# Patient Record
Sex: MALE | Race: WHITE | NOT HISPANIC OR LATINO
[De-identification: names, ages, dates, MRNs, and addresses within clinical notes are randomized per-mention and may not be internally consistent; named-entity substitution may affect disease eponyms.]

---

## 2017-02-27 ENCOUNTER — APPOINTMENT (OUTPATIENT)
Dept: PLASTIC SURGERY | Facility: CLINIC | Age: 82
End: 2017-02-27

## 2017-03-13 ENCOUNTER — APPOINTMENT (OUTPATIENT)
Dept: INTERNAL MEDICINE | Facility: CLINIC | Age: 82
End: 2017-03-13

## 2017-03-13 VITALS
SYSTOLIC BLOOD PRESSURE: 130 MMHG | BODY MASS INDEX: 32.49 KG/M2 | WEIGHT: 195 LBS | DIASTOLIC BLOOD PRESSURE: 80 MMHG | HEART RATE: 72 BPM | TEMPERATURE: 98.4 F | RESPIRATION RATE: 14 BRPM | HEIGHT: 65 IN | OXYGEN SATURATION: 97 %

## 2017-03-13 DIAGNOSIS — J06.9 ACUTE UPPER RESPIRATORY INFECTION, UNSPECIFIED: ICD-10-CM

## 2017-04-12 ENCOUNTER — APPOINTMENT (OUTPATIENT)
Dept: PLASTIC SURGERY | Facility: CLINIC | Age: 82
End: 2017-04-12

## 2017-04-28 ENCOUNTER — OUTPATIENT (OUTPATIENT)
Dept: OUTPATIENT SERVICES | Facility: HOSPITAL | Age: 82
LOS: 1 days | End: 2017-04-28
Payer: MEDICARE

## 2017-04-28 PROCEDURE — 93010 ELECTROCARDIOGRAM REPORT: CPT | Mod: NC

## 2017-04-28 PROCEDURE — 85025 COMPLETE CBC W/AUTO DIFF WBC: CPT

## 2017-04-28 PROCEDURE — 80048 BASIC METABOLIC PNL TOTAL CA: CPT

## 2017-04-28 PROCEDURE — G0463: CPT

## 2017-04-28 PROCEDURE — 36415 COLL VENOUS BLD VENIPUNCTURE: CPT

## 2017-04-28 PROCEDURE — 93005 ELECTROCARDIOGRAM TRACING: CPT

## 2017-05-01 ENCOUNTER — APPOINTMENT (OUTPATIENT)
Dept: INTERNAL MEDICINE | Facility: CLINIC | Age: 82
End: 2017-05-01

## 2017-05-01 VITALS
HEIGHT: 65 IN | WEIGHT: 199 LBS | RESPIRATION RATE: 14 BRPM | BODY MASS INDEX: 33.15 KG/M2 | DIASTOLIC BLOOD PRESSURE: 70 MMHG | SYSTOLIC BLOOD PRESSURE: 100 MMHG | TEMPERATURE: 97.9 F

## 2017-05-01 DIAGNOSIS — Z01.818 ENCOUNTER FOR OTHER PREPROCEDURAL EXAMINATION: ICD-10-CM

## 2017-05-02 PROBLEM — Z01.818 PREOP EXAMINATION: Status: ACTIVE | Noted: 2017-05-02

## 2017-05-08 ENCOUNTER — OUTPATIENT (OUTPATIENT)
Dept: OUTPATIENT SERVICES | Facility: HOSPITAL | Age: 82
LOS: 1 days | Discharge: HOME | End: 2017-05-08
Payer: MEDICARE

## 2017-05-08 ENCOUNTER — RESULT REVIEW (OUTPATIENT)
Age: 82
End: 2017-05-08

## 2017-05-08 PROCEDURE — 26145 TENDON EXCISION PALM/FINGER: CPT | Mod: F3

## 2017-05-08 PROCEDURE — 88304 TISSUE EXAM BY PATHOLOGIST: CPT | Mod: 26

## 2017-05-09 PROCEDURE — 26055 INCISE FINGER TENDON SHEATH: CPT | Mod: F8

## 2017-05-09 PROCEDURE — 88304 TISSUE EXAM BY PATHOLOGIST: CPT

## 2017-05-15 ENCOUNTER — TRANSCRIPTION ENCOUNTER (OUTPATIENT)
Age: 82
End: 2017-05-15

## 2017-05-17 ENCOUNTER — APPOINTMENT (OUTPATIENT)
Dept: PLASTIC SURGERY | Facility: CLINIC | Age: 82
End: 2017-05-17

## 2017-05-19 ENCOUNTER — APPOINTMENT (OUTPATIENT)
Dept: CARDIOLOGY | Facility: CLINIC | Age: 82
End: 2017-05-19

## 2017-05-19 VITALS
HEART RATE: 71 BPM | HEIGHT: 65 IN | OXYGEN SATURATION: 94 % | WEIGHT: 198 LBS | DIASTOLIC BLOOD PRESSURE: 75 MMHG | BODY MASS INDEX: 32.99 KG/M2 | SYSTOLIC BLOOD PRESSURE: 121 MMHG

## 2017-05-19 DIAGNOSIS — I73.9 PERIPHERAL VASCULAR DISEASE, UNSPECIFIED: ICD-10-CM

## 2017-05-23 ENCOUNTER — APPOINTMENT (OUTPATIENT)
Dept: CARDIOLOGY | Facility: CLINIC | Age: 82
End: 2017-05-23

## 2017-05-31 ENCOUNTER — APPOINTMENT (OUTPATIENT)
Dept: PLASTIC SURGERY | Facility: CLINIC | Age: 82
End: 2017-05-31

## 2017-05-31 DIAGNOSIS — M65.30 TRIGGER FINGER, UNSPECIFIED FINGER: ICD-10-CM

## 2017-07-24 ENCOUNTER — RX RENEWAL (OUTPATIENT)
Age: 82
End: 2017-07-24

## 2017-08-15 ENCOUNTER — APPOINTMENT (OUTPATIENT)
Dept: CARDIOLOGY | Facility: CLINIC | Age: 82
End: 2017-08-15
Payer: MEDICARE

## 2017-08-15 PROCEDURE — 93280 PM DEVICE PROGR EVAL DUAL: CPT

## 2017-10-30 ENCOUNTER — RX RENEWAL (OUTPATIENT)
Age: 82
End: 2017-10-30

## 2017-12-01 ENCOUNTER — MEDICATION RENEWAL (OUTPATIENT)
Age: 82
End: 2017-12-01

## 2018-01-10 ENCOUNTER — RX RENEWAL (OUTPATIENT)
Age: 83
End: 2018-01-10

## 2018-02-13 ENCOUNTER — RX RENEWAL (OUTPATIENT)
Age: 83
End: 2018-02-13

## 2018-02-15 ENCOUNTER — APPOINTMENT (OUTPATIENT)
Dept: GASTROENTEROLOGY | Facility: CLINIC | Age: 83
End: 2018-02-15
Payer: MEDICARE

## 2018-02-15 VITALS
OXYGEN SATURATION: 96 % | TEMPERATURE: 98 F | DIASTOLIC BLOOD PRESSURE: 84 MMHG | HEART RATE: 64 BPM | HEIGHT: 65 IN | WEIGHT: 195 LBS | BODY MASS INDEX: 32.49 KG/M2 | SYSTOLIC BLOOD PRESSURE: 137 MMHG | RESPIRATION RATE: 13 BRPM

## 2018-02-15 DIAGNOSIS — R14.2 ERUCTATION: ICD-10-CM

## 2018-02-15 DIAGNOSIS — K63.89 OTHER SPECIFIED DISEASES OF INTESTINE: ICD-10-CM

## 2018-02-15 DIAGNOSIS — K58.1 IRRITABLE BOWEL SYNDROME WITH CONSTIPATION: ICD-10-CM

## 2018-02-15 PROCEDURE — 99214 OFFICE O/P EST MOD 30 MIN: CPT

## 2018-02-26 ENCOUNTER — APPOINTMENT (OUTPATIENT)
Dept: INTERNAL MEDICINE | Facility: CLINIC | Age: 83
End: 2018-02-26
Payer: MEDICARE

## 2018-02-26 ENCOUNTER — NON-APPOINTMENT (OUTPATIENT)
Age: 83
End: 2018-02-26

## 2018-02-26 VITALS
SYSTOLIC BLOOD PRESSURE: 110 MMHG | WEIGHT: 195 LBS | DIASTOLIC BLOOD PRESSURE: 74 MMHG | TEMPERATURE: 98.1 F | HEART RATE: 66 BPM | RESPIRATION RATE: 14 BRPM | BODY MASS INDEX: 32.45 KG/M2 | OXYGEN SATURATION: 98 %

## 2018-02-26 DIAGNOSIS — Z00.00 ENCOUNTER FOR GENERAL ADULT MEDICAL EXAMINATION W/OUT ABNORMAL FINDINGS: ICD-10-CM

## 2018-02-26 PROCEDURE — G0439: CPT | Mod: 25

## 2018-02-26 PROCEDURE — 93000 ELECTROCARDIOGRAM COMPLETE: CPT

## 2018-02-26 RX ORDER — AZITHROMYCIN 250 MG/1
250 TABLET, FILM COATED ORAL
Qty: 1 | Refills: 0 | Status: DISCONTINUED | COMMUNITY
Start: 2017-03-13 | End: 2018-02-26

## 2018-02-28 ENCOUNTER — LABORATORY RESULT (OUTPATIENT)
Age: 83
End: 2018-02-28

## 2018-03-06 ENCOUNTER — APPOINTMENT (OUTPATIENT)
Dept: CARDIOLOGY | Facility: CLINIC | Age: 83
End: 2018-03-06
Payer: MEDICARE

## 2018-03-06 PROCEDURE — 93280 PM DEVICE PROGR EVAL DUAL: CPT

## 2018-03-13 DIAGNOSIS — R89.9 UNSPECIFIED ABNORMAL FINDING IN SPECIMENS FROM OTHER ORGANS, SYSTEMS AND TISSUES: ICD-10-CM

## 2018-03-13 LAB
ALBUMIN SERPL ELPH-MCNC: 4 G/DL
ALP BLD-CCNC: 74 U/L
ALT SERPL-CCNC: 21 U/L
ANION GAP SERPL CALC-SCNC: 16 MMOL/L
APPEARANCE: CLEAR
AST SERPL-CCNC: 23 U/L
BASOPHILS # BLD AUTO: 0.03 K/UL
BASOPHILS NFR BLD AUTO: 0.5 %
BILIRUB SERPL-MCNC: 0.4 MG/DL
BILIRUBIN URINE: NEGATIVE
BLOOD URINE: ABNORMAL
BUN SERPL-MCNC: 20 MG/DL
CALCIUM SERPL-MCNC: 10.7 MG/DL
CHLORIDE SERPL-SCNC: 105 MMOL/L
CHOLEST SERPL-MCNC: 221 MG/DL
CHOLEST/HDLC SERPL: 4 RATIO
CO2 SERPL-SCNC: 26 MMOL/L
COLOR: YELLOW
CREAT SERPL-MCNC: 1.27 MG/DL
EOSINOPHIL # BLD AUTO: 0.04 K/UL
EOSINOPHIL NFR BLD AUTO: 0.7 %
ESTIMATED AVERAGE GLUCOSE: 126 MG/DL
FOLATE SERPL-MCNC: 16.7 NG/ML
GLUCOSE QUALITATIVE U: NEGATIVE MG/DL
GLUCOSE SERPL-MCNC: 93 MG/DL
HBA1C MFR BLD HPLC: 6 %
HCT VFR BLD CALC: 44.2 %
HDLC SERPL-MCNC: 55 MG/DL
HGB BLD-MCNC: 14.3 G/DL
IMM GRANULOCYTES NFR BLD AUTO: 0 %
KETONES URINE: NEGATIVE
LDLC SERPL CALC-MCNC: 125 MG/DL
LEUKOCYTE ESTERASE URINE: NEGATIVE
LYMPHOCYTES # BLD AUTO: 3.34 K/UL
LYMPHOCYTES NFR BLD AUTO: 56.8 %
MAN DIFF?: NORMAL
MCHC RBC-ENTMCNC: 28.7 PG
MCHC RBC-ENTMCNC: 32.4 GM/DL
MCV RBC AUTO: 88.6 FL
MONOCYTES # BLD AUTO: 0.59 K/UL
MONOCYTES NFR BLD AUTO: 10 %
NEUTROPHILS # BLD AUTO: 1.88 K/UL
NEUTROPHILS NFR BLD AUTO: 32 %
NITRITE URINE: NEGATIVE
PH URINE: 5.5
PLATELET # BLD AUTO: 198 K/UL
POTASSIUM SERPL-SCNC: 4.1 MMOL/L
PROT SERPL-MCNC: 6.8 G/DL
PROTEIN URINE: NEGATIVE MG/DL
PSA SERPL-MCNC: 8.14 NG/ML
RBC # BLD: 4.99 M/UL
RBC # FLD: 13.8 %
SODIUM SERPL-SCNC: 147 MMOL/L
SPECIFIC GRAVITY URINE: 1.01
TRIGL SERPL-MCNC: 207 MG/DL
TSH SERPL-ACNC: 4.59 UIU/ML
UROBILINOGEN URINE: NEGATIVE MG/DL
VIT B12 SERPL-MCNC: 425 PG/ML
WBC # FLD AUTO: 5.88 K/UL

## 2018-03-26 ENCOUNTER — APPOINTMENT (OUTPATIENT)
Dept: INTERNAL MEDICINE | Facility: CLINIC | Age: 83
End: 2018-03-26

## 2018-04-10 ENCOUNTER — APPOINTMENT (OUTPATIENT)
Dept: CARDIOLOGY | Facility: CLINIC | Age: 83
End: 2018-04-10
Payer: MEDICARE

## 2018-04-10 VITALS
SYSTOLIC BLOOD PRESSURE: 120 MMHG | DIASTOLIC BLOOD PRESSURE: 75 MMHG | WEIGHT: 195 LBS | HEART RATE: 75 BPM | OXYGEN SATURATION: 98 % | BODY MASS INDEX: 32.49 KG/M2 | HEIGHT: 65 IN

## 2018-04-10 DIAGNOSIS — I45.9 CONDUCTION DISORDER, UNSPECIFIED: ICD-10-CM

## 2018-04-10 DIAGNOSIS — R60.0 LOCALIZED EDEMA: ICD-10-CM

## 2018-04-10 DIAGNOSIS — R09.89 OTHER SPECIFIED SYMPTOMS AND SIGNS INVOLVING THE CIRCULATORY AND RESPIRATORY SYSTEMS: ICD-10-CM

## 2018-04-10 PROCEDURE — 99214 OFFICE O/P EST MOD 30 MIN: CPT

## 2018-04-19 ENCOUNTER — APPOINTMENT (OUTPATIENT)
Dept: CARDIOLOGY | Facility: CLINIC | Age: 83
End: 2018-04-19
Payer: MEDICARE

## 2018-04-19 PROCEDURE — 93880 EXTRACRANIAL BILAT STUDY: CPT

## 2018-08-17 ENCOUNTER — APPOINTMENT (OUTPATIENT)
Dept: INTERNAL MEDICINE | Facility: CLINIC | Age: 83
End: 2018-08-17
Payer: MEDICARE

## 2018-08-17 VITALS
RESPIRATION RATE: 14 BRPM | TEMPERATURE: 98 F | SYSTOLIC BLOOD PRESSURE: 120 MMHG | HEART RATE: 76 BPM | BODY MASS INDEX: 32.49 KG/M2 | DIASTOLIC BLOOD PRESSURE: 60 MMHG | OXYGEN SATURATION: 97 % | HEIGHT: 65 IN | WEIGHT: 195 LBS

## 2018-08-17 DIAGNOSIS — M50.90 CERVICAL DISC DISORDER, UNSPECIFIED, UNSPECIFIED CERVICAL REGION: ICD-10-CM

## 2018-08-17 PROCEDURE — 99214 OFFICE O/P EST MOD 30 MIN: CPT | Mod: 25

## 2018-08-17 PROCEDURE — 36415 COLL VENOUS BLD VENIPUNCTURE: CPT

## 2018-08-17 NOTE — HISTORY OF PRESENT ILLNESS
[FreeTextEntry8] : Pt c/o increased abdominal gas. Saw Dr. Tao SALMERON. Would like to get a second opinion.\par Pt also c/o daily pain in his upper neck . This is a chronic problem which has worsened. Saw pain management but had little success with injections. Saw chiropractor without success.

## 2018-08-20 ENCOUNTER — RX RENEWAL (OUTPATIENT)
Age: 83
End: 2018-08-20

## 2018-08-21 DIAGNOSIS — E83.52 HYPERCALCEMIA: ICD-10-CM

## 2018-08-27 LAB
ALBUMIN SERPL ELPH-MCNC: 4.4 G/DL
ALP BLD-CCNC: 58 U/L
ALT SERPL-CCNC: 17 U/L
ANION GAP SERPL CALC-SCNC: 15 MMOL/L
AST SERPL-CCNC: 26 U/L
BASOPHILS # BLD AUTO: 0.02 K/UL
BASOPHILS NFR BLD AUTO: 0.3 %
BILIRUB SERPL-MCNC: 0.5 MG/DL
BUN SERPL-MCNC: 20 MG/DL
CALCIUM SERPL-MCNC: 10.9 MG/DL
CALCIUM SERPL-MCNC: 11 MG/DL
CHLORIDE SERPL-SCNC: 104 MMOL/L
CO2 SERPL-SCNC: 26 MMOL/L
CREAT SERPL-MCNC: 1.13 MG/DL
EOSINOPHIL # BLD AUTO: 0.05 K/UL
EOSINOPHIL NFR BLD AUTO: 0.8 %
FERRITIN SERPL-MCNC: 270 NG/ML
GLUCOSE SERPL-MCNC: 98 MG/DL
HCT VFR BLD CALC: 42.3 %
HGB BLD-MCNC: 13.6 G/DL
IMM GRANULOCYTES NFR BLD AUTO: 0.3 %
IRON SATN MFR SERPL: 24 %
IRON SERPL-MCNC: 78 UG/DL
LYMPHOCYTES # BLD AUTO: 2.54 K/UL
LYMPHOCYTES NFR BLD AUTO: 40.9 %
MAN DIFF?: NORMAL
MCHC RBC-ENTMCNC: 28.5 PG
MCHC RBC-ENTMCNC: 32.2 GM/DL
MCV RBC AUTO: 88.7 FL
MONOCYTES # BLD AUTO: 0.62 K/UL
MONOCYTES NFR BLD AUTO: 10 %
NEUTROPHILS # BLD AUTO: 2.96 K/UL
NEUTROPHILS NFR BLD AUTO: 47.7 %
PARATHYROID HORMONE INTACT: 62 PG/ML
PLATELET # BLD AUTO: 181 K/UL
POTASSIUM SERPL-SCNC: 5.1 MMOL/L
PROT SERPL-MCNC: 7.3 G/DL
RBC # BLD: 4.77 M/UL
RBC # FLD: 13.9 %
SODIUM SERPL-SCNC: 145 MMOL/L
TIBC SERPL-MCNC: 331 UG/DL
UIBC SERPL-MCNC: 253 UG/DL
WBC # FLD AUTO: 6.21 K/UL

## 2018-09-28 ENCOUNTER — RX RENEWAL (OUTPATIENT)
Age: 83
End: 2018-09-28

## 2018-12-03 ENCOUNTER — RX RENEWAL (OUTPATIENT)
Age: 83
End: 2018-12-03

## 2019-01-08 ENCOUNTER — MEDICATION RENEWAL (OUTPATIENT)
Age: 84
End: 2019-01-08

## 2019-01-10 ENCOUNTER — RX RENEWAL (OUTPATIENT)
Age: 84
End: 2019-01-10

## 2019-01-14 ENCOUNTER — RX RENEWAL (OUTPATIENT)
Age: 84
End: 2019-01-14

## 2019-02-11 ENCOUNTER — MEDICATION RENEWAL (OUTPATIENT)
Age: 84
End: 2019-02-11

## 2019-02-11 RX ORDER — ROSUVASTATIN CALCIUM 5 MG/1
5 TABLET, FILM COATED ORAL
Qty: 90 | Refills: 3 | Status: ACTIVE | COMMUNITY
Start: 2018-08-20 | End: 1900-01-01

## 2019-02-25 ENCOUNTER — MEDICATION RENEWAL (OUTPATIENT)
Age: 84
End: 2019-02-25

## 2019-03-19 ENCOUNTER — APPOINTMENT (OUTPATIENT)
Dept: CARDIOLOGY | Facility: CLINIC | Age: 84
End: 2019-03-19
Payer: MEDICARE

## 2019-03-19 PROCEDURE — 93280 PM DEVICE PROGR EVAL DUAL: CPT

## 2019-05-23 ENCOUNTER — CLINICAL ADVICE (OUTPATIENT)
Age: 84
End: 2019-05-23

## 2019-05-23 DIAGNOSIS — R11.0 NAUSEA: ICD-10-CM

## 2019-05-23 RX ORDER — ONDANSETRON 8 MG/1
8 TABLET, ORALLY DISINTEGRATING ORAL EVERY 6 HOURS
Qty: 40 | Refills: 0 | Status: ACTIVE | COMMUNITY
Start: 2019-05-23 | End: 1900-01-01

## 2019-05-28 ENCOUNTER — APPOINTMENT (OUTPATIENT)
Dept: INTERNAL MEDICINE | Facility: CLINIC | Age: 84
End: 2019-05-28
Payer: MEDICARE

## 2019-05-28 VITALS
RESPIRATION RATE: 14 BRPM | HEART RATE: 71 BPM | WEIGHT: 193 LBS | OXYGEN SATURATION: 98 % | TEMPERATURE: 98.2 F | BODY MASS INDEX: 32.15 KG/M2 | DIASTOLIC BLOOD PRESSURE: 70 MMHG | HEIGHT: 65 IN | SYSTOLIC BLOOD PRESSURE: 120 MMHG

## 2019-05-28 PROCEDURE — 99214 OFFICE O/P EST MOD 30 MIN: CPT

## 2019-05-28 RX ORDER — ONDANSETRON 4 MG/1
4 TABLET, ORALLY DISINTEGRATING ORAL
Qty: 12 | Refills: 0 | Status: ACTIVE | COMMUNITY
Start: 2019-05-23

## 2019-05-28 NOTE — PHYSICAL EXAM
[No Acute Distress] : no acute distress [Well Developed] : well developed [Well Nourished] : well nourished [Well-Appearing] : well-appearing [Normal Sclera/Conjunctiva] : normal sclera/conjunctiva [PERRL] : pupils equal round and reactive to light [EOMI] : extraocular movements intact [Normal Outer Ear/Nose] : the outer ears and nose were normal in appearance [Normal Oropharynx] : the oropharynx was normal [No JVD] : no jugular venous distention [Thyroid Normal, No Nodules] : the thyroid was normal and there were no nodules present [Supple] : supple [No Lymphadenopathy] : no lymphadenopathy [No Respiratory Distress] : no respiratory distress  [Clear to Auscultation] : lungs were clear to auscultation bilaterally [Normal Rate] : normal rate  [Regular Rhythm] : with a regular rhythm [No Accessory Muscle Use] : no accessory muscle use [Normal S1, S2] : normal S1 and S2 [No Murmur] : no murmur heard [No Carotid Bruits] : no carotid bruits [No Varicosities] : no varicosities [No Abdominal Bruit] : a ~M bruit was not heard ~T in the abdomen [No Edema] : there was no peripheral edema [Pedal Pulses Present] : the pedal pulses are present [No Extremity Clubbing/Cyanosis] : no extremity clubbing/cyanosis [Soft] : abdomen soft [No Palpable Aorta] : no palpable aorta [No Masses] : no abdominal mass palpated [Non-distended] : non-distended [Non Tender] : non-tender [Normal Posterior Cervical Nodes] : no posterior cervical lymphadenopathy [Normal Bowel Sounds] : normal bowel sounds [No HSM] : no HSM [Normal Anterior Cervical Nodes] : no anterior cervical lymphadenopathy [No CVA Tenderness] : no CVA  tenderness [Grossly Normal Strength/Tone] : grossly normal strength/tone [No Spinal Tenderness] : no spinal tenderness [No Joint Swelling] : no joint swelling [Normal Gait] : normal gait [No Rash] : no rash [Coordination Grossly Intact] : coordination grossly intact [No Focal Deficits] : no focal deficits [Deep Tendon Reflexes (DTR)] : deep tendon reflexes were 2+ and symmetric [Normal Affect] : the affect was normal [Normal Insight/Judgement] : insight and judgment were intact

## 2019-05-28 NOTE — HISTORY OF PRESENT ILLNESS
[FreeTextEntry8] : Pt went to [facility] with abdominal pain on 5/22/19. He was diagnosed with diverticulitis. Pt opted not to stay in the hospital. He was discharged on cefpodoxime and metronidazolw. Pt has continued to have abdominal pain. The pt reports acute diffuse abdominal pain last night with belching.

## 2019-05-29 ENCOUNTER — APPOINTMENT (OUTPATIENT)
Dept: COLORECTAL SURGERY | Facility: CLINIC | Age: 84
End: 2019-05-29
Payer: MEDICARE

## 2019-05-29 VITALS
DIASTOLIC BLOOD PRESSURE: 84 MMHG | SYSTOLIC BLOOD PRESSURE: 121 MMHG | WEIGHT: 193 LBS | HEART RATE: 71 BPM | TEMPERATURE: 98.2 F | BODY MASS INDEX: 32.15 KG/M2 | RESPIRATION RATE: 14 BRPM | HEIGHT: 65 IN

## 2019-05-29 VITALS
HEIGHT: 65 IN | RESPIRATION RATE: 14 BRPM | TEMPERATURE: 98.2 F | SYSTOLIC BLOOD PRESSURE: 119 MMHG | DIASTOLIC BLOOD PRESSURE: 76 MMHG | HEART RATE: 75 BPM | WEIGHT: 193 LBS | BODY MASS INDEX: 32.15 KG/M2

## 2019-05-29 DIAGNOSIS — K57.92 DIVERTICULITIS OF INTESTINE, PART UNSPECIFIED, W/OUT PERFORATION OR ABSCESS W/OUT BLEEDING: ICD-10-CM

## 2019-05-29 LAB
ALBUMIN SERPL ELPH-MCNC: 4.3 G/DL
ALP BLD-CCNC: 73 U/L
ALT SERPL-CCNC: 37 U/L
ANION GAP SERPL CALC-SCNC: 12 MMOL/L
AST SERPL-CCNC: 43 U/L
BASOPHILS # BLD AUTO: 0.04 K/UL
BASOPHILS NFR BLD AUTO: 0.6 %
BILIRUB SERPL-MCNC: 0.4 MG/DL
BUN SERPL-MCNC: 18 MG/DL
CALCIUM SERPL-MCNC: 10.6 MG/DL
CHLORIDE SERPL-SCNC: 104 MMOL/L
CO2 SERPL-SCNC: 26 MMOL/L
CREAT SERPL-MCNC: 1.16 MG/DL
EOSINOPHIL # BLD AUTO: 0.06 K/UL
EOSINOPHIL NFR BLD AUTO: 0.8 %
GLUCOSE SERPL-MCNC: 90 MG/DL
HCT VFR BLD CALC: 48.2 %
HGB BLD-MCNC: 14.1 G/DL
IMM GRANULOCYTES NFR BLD AUTO: 0.3 %
LYMPHOCYTES # BLD AUTO: 2.69 K/UL
LYMPHOCYTES NFR BLD AUTO: 37.2 %
MAN DIFF?: NORMAL
MCHC RBC-ENTMCNC: 27.8 PG
MCHC RBC-ENTMCNC: 29.3 GM/DL
MCV RBC AUTO: 94.9 FL
MONOCYTES # BLD AUTO: 0.77 K/UL
MONOCYTES NFR BLD AUTO: 10.6 %
NEUTROPHILS # BLD AUTO: 3.66 K/UL
NEUTROPHILS NFR BLD AUTO: 50.5 %
PLATELET # BLD AUTO: 229 K/UL
POTASSIUM SERPL-SCNC: 4.7 MMOL/L
PROT SERPL-MCNC: 7.1 G/DL
RBC # BLD: 5.08 M/UL
RBC # FLD: 13.5 %
SODIUM SERPL-SCNC: 142 MMOL/L
WBC # FLD AUTO: 7.24 K/UL

## 2019-05-29 PROCEDURE — 99203 OFFICE O/P NEW LOW 30 MIN: CPT

## 2019-05-29 RX ORDER — METRONIDAZOLE 500 MG/1
500 TABLET ORAL
Qty: 30 | Refills: 0 | Status: COMPLETED | COMMUNITY
Start: 2019-05-23 | End: 2019-05-29

## 2019-05-29 RX ORDER — CEFPODOXIME PROXETIL 200 MG/1
200 TABLET, FILM COATED ORAL
Qty: 14 | Refills: 0 | Status: DISCONTINUED | COMMUNITY
Start: 2019-05-23 | End: 2019-05-29

## 2019-05-29 NOTE — HISTORY OF PRESENT ILLNESS
[FreeTextEntry1] : Mr. Salas presents to the office for consultation.\par ~ 1 week earlier, was seen at [facility] for LLQ abdominal pain. CT A/P was performed and found to have acute sigmoid diverticulitis without associated free air/abscess. WBC 7 at the time. Discharged on Cefpodoxime and flagyl. Has not been taking flagyl secondary to intolerance.\par States LLQ pain has improved. Denies F/C. No N/V. Main c/o is excessive belching and gas pains. Passing diarrheal stools. Repeat WBC yesterday stable at 7.\par Remote h/o colonoscopy, is followed by Dr. Burger.

## 2019-06-07 ENCOUNTER — APPOINTMENT (OUTPATIENT)
Dept: CT IMAGING | Facility: CLINIC | Age: 84
End: 2019-06-07
Payer: MEDICARE

## 2019-06-07 ENCOUNTER — OUTPATIENT (OUTPATIENT)
Dept: OUTPATIENT SERVICES | Facility: HOSPITAL | Age: 84
LOS: 1 days | End: 2019-06-07
Payer: MEDICARE

## 2019-06-07 DIAGNOSIS — Z00.8 ENCOUNTER FOR OTHER GENERAL EXAMINATION: ICD-10-CM

## 2019-06-07 PROCEDURE — 74177 CT ABD & PELVIS W/CONTRAST: CPT | Mod: 26

## 2019-06-07 PROCEDURE — 74177 CT ABD & PELVIS W/CONTRAST: CPT

## 2019-06-10 ENCOUNTER — OTHER (OUTPATIENT)
Age: 84
End: 2019-06-10

## 2019-06-19 ENCOUNTER — OTHER (OUTPATIENT)
Age: 84
End: 2019-06-19

## 2019-06-20 ENCOUNTER — APPOINTMENT (OUTPATIENT)
Dept: INTERNAL MEDICINE | Facility: CLINIC | Age: 84
End: 2019-06-20
Payer: MEDICARE

## 2019-06-20 VITALS
DIASTOLIC BLOOD PRESSURE: 70 MMHG | WEIGHT: 195 LBS | BODY MASS INDEX: 32.45 KG/M2 | SYSTOLIC BLOOD PRESSURE: 110 MMHG | OXYGEN SATURATION: 97 % | TEMPERATURE: 98.1 F | HEART RATE: 77 BPM | RESPIRATION RATE: 14 BRPM

## 2019-06-20 DIAGNOSIS — K21.9 GASTRO-ESOPHAGEAL REFLUX DISEASE W/OUT ESOPHAGITIS: ICD-10-CM

## 2019-06-20 DIAGNOSIS — R10.9 UNSPECIFIED ABDOMINAL PAIN: ICD-10-CM

## 2019-06-20 PROCEDURE — 36415 COLL VENOUS BLD VENIPUNCTURE: CPT

## 2019-06-20 PROCEDURE — 99214 OFFICE O/P EST MOD 30 MIN: CPT | Mod: 25

## 2019-06-20 NOTE — HISTORY OF PRESENT ILLNESS
[FreeTextEntry8] : Pt was feeling better after completing antibiotics for diverticulitis. He had a recent CT Abdomen/Pelvis that showed diverticulosis without diverticulitis. \par For the past 4-r days. pt is c.o epigastric, midabdominal discomfort.

## 2019-06-20 NOTE — PLAN
[FreeTextEntry1] : Suggested going to the ER for further evaluation. Pt refuses\par Increase pantoprazole to 40mg BID.\par Supplement with TUMS PRN\par See GI; likely needs EGD

## 2019-06-20 NOTE — PHYSICAL EXAM
[Well Nourished] : well nourished [No Acute Distress] : no acute distress [Normal Sclera/Conjunctiva] : normal sclera/conjunctiva [Well-Appearing] : well-appearing [Well Developed] : well developed [PERRL] : pupils equal round and reactive to light [EOMI] : extraocular movements intact [No JVD] : no jugular venous distention [Normal Oropharynx] : the oropharynx was normal [Normal Outer Ear/Nose] : the outer ears and nose were normal in appearance [No Lymphadenopathy] : no lymphadenopathy [Supple] : supple [Clear to Auscultation] : lungs were clear to auscultation bilaterally [Thyroid Normal, No Nodules] : the thyroid was normal and there were no nodules present [No Respiratory Distress] : no respiratory distress  [Normal Rate] : normal rate  [No Accessory Muscle Use] : no accessory muscle use [No Murmur] : no murmur heard [Regular Rhythm] : with a regular rhythm [Normal S1, S2] : normal S1 and S2 [No Abdominal Bruit] : a ~M bruit was not heard ~T in the abdomen [No Carotid Bruits] : no carotid bruits [Pedal Pulses Present] : the pedal pulses are present [No Varicosities] : no varicosities [No Extremity Clubbing/Cyanosis] : no extremity clubbing/cyanosis [No Edema] : there was no peripheral edema [No Palpable Aorta] : no palpable aorta [Soft] : abdomen soft [No Masses] : no abdominal mass palpated [Non-distended] : non-distended [No HSM] : no HSM [Normal Posterior Cervical Nodes] : no posterior cervical lymphadenopathy [Normal Anterior Cervical Nodes] : no anterior cervical lymphadenopathy [Normal Bowel Sounds] : normal bowel sounds [No CVA Tenderness] : no CVA  tenderness [No Spinal Tenderness] : no spinal tenderness [Grossly Normal Strength/Tone] : grossly normal strength/tone [No Joint Swelling] : no joint swelling [No Rash] : no rash [Normal Gait] : normal gait [Coordination Grossly Intact] : coordination grossly intact [No Focal Deficits] : no focal deficits [Normal Affect] : the affect was normal [Deep Tendon Reflexes (DTR)] : deep tendon reflexes were 2+ and symmetric [Normal Insight/Judgement] : insight and judgment were intact [de-identified] : mild epigasrid, RUQ discomfort

## 2019-06-24 LAB
ALBUMIN SERPL ELPH-MCNC: 4.5 G/DL
ALP BLD-CCNC: 65 U/L
ALT SERPL-CCNC: 17 U/L
ANION GAP SERPL CALC-SCNC: 12 MMOL/L
AST SERPL-CCNC: 23 U/L
BASOPHILS # BLD AUTO: 0.04 K/UL
BASOPHILS NFR BLD AUTO: 0.7 %
BILIRUB SERPL-MCNC: 0.6 MG/DL
BUN SERPL-MCNC: 20 MG/DL
CALCIUM SERPL-MCNC: 11 MG/DL
CHLORIDE SERPL-SCNC: 103 MMOL/L
CO2 SERPL-SCNC: 25 MMOL/L
CREAT SERPL-MCNC: 1.05 MG/DL
CRP SERPL-MCNC: 0.12 MG/DL
EOSINOPHIL # BLD AUTO: 0.04 K/UL
EOSINOPHIL NFR BLD AUTO: 0.7 %
FERRITIN SERPL-MCNC: 255 NG/ML
GLUCOSE SERPL-MCNC: 101 MG/DL
HCT VFR BLD CALC: 46 %
HGB BLD-MCNC: 14 G/DL
IMM GRANULOCYTES NFR BLD AUTO: 0.2 %
IRON SATN MFR SERPL: 23 %
IRON SERPL-MCNC: 75 UG/DL
LYMPHOCYTES # BLD AUTO: 2.53 K/UL
LYMPHOCYTES NFR BLD AUTO: 42.5 %
MAN DIFF?: NORMAL
MCHC RBC-ENTMCNC: 28 PG
MCHC RBC-ENTMCNC: 30.4 GM/DL
MCV RBC AUTO: 92 FL
MONOCYTES # BLD AUTO: 0.64 K/UL
MONOCYTES NFR BLD AUTO: 10.8 %
NEUTROPHILS # BLD AUTO: 2.69 K/UL
NEUTROPHILS NFR BLD AUTO: 45.1 %
PLATELET # BLD AUTO: 186 K/UL
POTASSIUM SERPL-SCNC: 4.8 MMOL/L
PROT SERPL-MCNC: 7 G/DL
RBC # BLD: 5 M/UL
RBC # FLD: 13.4 %
SODIUM SERPL-SCNC: 140 MMOL/L
TIBC SERPL-MCNC: 322 UG/DL
UIBC SERPL-MCNC: 247 UG/DL
WBC # FLD AUTO: 5.95 K/UL

## 2019-06-27 ENCOUNTER — APPOINTMENT (OUTPATIENT)
Dept: CARDIOLOGY | Facility: CLINIC | Age: 84
End: 2019-06-27
Payer: MEDICARE

## 2019-06-27 ENCOUNTER — NON-APPOINTMENT (OUTPATIENT)
Age: 84
End: 2019-06-27

## 2019-06-27 VITALS
SYSTOLIC BLOOD PRESSURE: 128 MMHG | OXYGEN SATURATION: 96 % | BODY MASS INDEX: 32.32 KG/M2 | WEIGHT: 194 LBS | DIASTOLIC BLOOD PRESSURE: 77 MMHG | HEART RATE: 65 BPM | HEIGHT: 65 IN

## 2019-06-27 DIAGNOSIS — G45.9 TRANSIENT CEREBRAL ISCHEMIC ATTACK, UNSPECIFIED: ICD-10-CM

## 2019-06-27 DIAGNOSIS — E78.5 HYPERLIPIDEMIA, UNSPECIFIED: ICD-10-CM

## 2019-06-27 DIAGNOSIS — I65.29 OCCLUSION AND STENOSIS OF UNSPECIFIED CAROTID ARTERY: ICD-10-CM

## 2019-06-27 DIAGNOSIS — I49.5 SICK SINUS SYNDROME: ICD-10-CM

## 2019-06-27 PROCEDURE — 93000 ELECTROCARDIOGRAM COMPLETE: CPT

## 2019-06-27 PROCEDURE — 99214 OFFICE O/P EST MOD 30 MIN: CPT

## 2019-06-27 NOTE — REASON FOR VISIT
[Follow-Up - Clinic] : a clinic follow-up of [Hypertension] : hypertension [Sick Sinus Syndrome] : sick sinus syndrome [FreeTextEntry1] : and edema

## 2019-06-27 NOTE — DISCUSSION/SUMMARY
[FreeTextEntry1] : The patient's cardiac status is stable. Clinically he has no symptoms of active heart disease and had good functional status until his recent attack of diverticular disease.\par \par He had a stable carotid Doppler in 2018, and pacemaker was checked 3/19.\par \par There are no cardiac contraindications to planned endoscopy. No special precautions other than routine hemodynamic monitoring should be required.

## 2019-06-27 NOTE — HISTORY OF PRESENT ILLNESS
[FreeTextEntry1] : Joel is a 89-year-old gentleman with history of TIA, prior history of hypertension, carotid artery disease, reports that he is a former smoker but hasn't smoked for many years and tries to eat healthy. He has a pacemaker which is functioning within acceptable limits based on his last interrogation 3/19. . He had an echocardiogram 2/14 that showed an ejection fraction of 54%. He had mild TR with PA pressure of 30, and grade 1 diastolic dysfunction. carotid Doppler performed 4/18 showed mild-mod plaque. The patient had a chemical stress was performed 3/14 that showed no ischemia.\par \par The patient has been asymptomatic without any chest pain, shortness of breath, lightheadedness, palpitations. He does all the shopping in his house and had good functional status without chest pain or shortness of breath until 3 weeks ago when he had an acute attack of diverticulitis. He was placed on antibiotics for about 3 weeks. Since that time he now has severe abdominal gas and  pain. He was scheduled for endoscopy this Friday which unfortunately had to be canceled.\par \par ECG shows AV pacing. Blood work 6/19 demonstrates normal CBC and CMP

## 2019-06-27 NOTE — PHYSICAL EXAM
[Well Groomed] : well groomed [Normal Appearance] : normal appearance [General Appearance - Well Developed] : well developed [General Appearance - Well Nourished] : well nourished [No Deformities] : no deformities [General Appearance - In No Acute Distress] : no acute distress [Normal Conjunctiva] : the conjunctiva exhibited no abnormalities [Normal Oral Mucosa] : normal oral mucosa [No Oral Pallor] : no oral pallor [Eyelids - No Xanthelasma] : the eyelids demonstrated no xanthelasmas [Normal Jugular Venous A Waves Present] : normal jugular venous A waves present [No Oral Cyanosis] : no oral cyanosis [Normal Jugular Venous V Waves Present] : normal jugular venous V waves present [No Jugular Venous Nguyen A Waves] : no jugular venous nguyen A waves [Respiration, Rhythm And Depth] : normal respiratory rhythm and effort [Auscultation Breath Sounds / Voice Sounds] : lungs were clear to auscultation bilaterally [Bowel Sounds] : normal bowel sounds [Exaggerated Use Of Accessory Muscles For Inspiration] : no accessory muscle use [Abdomen Soft] : soft [Abdomen Tenderness] : non-tender [Abnormal Walk] : normal gait [Cyanosis, Localized] : no localized cyanosis [Nail Clubbing] : no clubbing of the fingernails [Petechial Hemorrhages (___cm)] : no petechial hemorrhages [Skin Color & Pigmentation] : normal skin color and pigmentation [Skin Turgor] : normal skin turgor [No Venous Stasis] : no venous stasis [] : no rash [No Skin Ulcers] : no skin ulcer [Oriented To Time, Place, And Person] : oriented to person, place, and time [Affect] : the affect was normal [Mood] : the mood was normal [No Anxiety] : not feeling anxious [No Precordial Heave] : no precordial heave was noted [Not Palpable] : not palpable [Normal S1] : normal S1 [Rhythm Regular] : regular [Normal Rate] : normal [No Gallop] : no gallop heard [Normal S2] : normal S2 [I] : a grade 1 [2+] : right 2+ [1+] : left 1+ [Lt] : varicose veins of the left leg noted [___ +] : [unfilled]U+ pitting edema to L ankle [Rt] : varicose veins of the right leg noted [Click] : no click [Apical Thrill] : no thrill palpable at the apex [Pericardial Rub] : no pericardial rub [Right Carotid Bruit] : no bruit heard over the right carotid [Left Carotid Bruit] : no bruit heard over the left carotid [Bruit] : no bruit heard

## 2019-06-30 ENCOUNTER — TRANSCRIPTION ENCOUNTER (OUTPATIENT)
Age: 84
End: 2019-06-30

## 2019-07-01 ENCOUNTER — APPOINTMENT (OUTPATIENT)
Dept: GASTROENTEROLOGY | Facility: HOSPITAL | Age: 84
End: 2019-07-01

## 2019-07-01 ENCOUNTER — RESULT REVIEW (OUTPATIENT)
Age: 84
End: 2019-07-01

## 2019-07-01 ENCOUNTER — OUTPATIENT (OUTPATIENT)
Dept: OUTPATIENT SERVICES | Facility: HOSPITAL | Age: 84
LOS: 1 days | Discharge: HOME | End: 2019-07-01
Payer: MEDICARE

## 2019-07-01 DIAGNOSIS — R14.2 ERUCTATION: ICD-10-CM

## 2019-07-01 PROCEDURE — 88342 IMHCHEM/IMCYTCHM 1ST ANTB: CPT

## 2019-07-01 PROCEDURE — 88312 SPECIAL STAINS GROUP 1: CPT | Mod: 26

## 2019-07-01 PROCEDURE — 88342 IMHCHEM/IMCYTCHM 1ST ANTB: CPT | Mod: 26

## 2019-07-01 PROCEDURE — 88305 TISSUE EXAM BY PATHOLOGIST: CPT | Mod: 26

## 2019-07-01 PROCEDURE — 43239 EGD BIOPSY SINGLE/MULTIPLE: CPT

## 2019-07-01 PROCEDURE — 88305 TISSUE EXAM BY PATHOLOGIST: CPT

## 2019-07-01 PROCEDURE — 88313 SPECIAL STAINS GROUP 2: CPT

## 2019-07-01 PROCEDURE — 88313 SPECIAL STAINS GROUP 2: CPT | Mod: 26

## 2019-07-01 PROCEDURE — 88312 SPECIAL STAINS GROUP 1: CPT

## 2019-07-02 LAB — SURGICAL PATHOLOGY STUDY: SIGNIFICANT CHANGE UP

## 2019-07-05 ENCOUNTER — APPOINTMENT (OUTPATIENT)
Dept: INTERNAL MEDICINE | Facility: CLINIC | Age: 84
End: 2019-07-05
Payer: MEDICARE

## 2019-07-05 VITALS
HEIGHT: 65 IN | WEIGHT: 193 LBS | TEMPERATURE: 98.1 F | HEART RATE: 70 BPM | SYSTOLIC BLOOD PRESSURE: 122 MMHG | RESPIRATION RATE: 14 BRPM | OXYGEN SATURATION: 97 % | BODY MASS INDEX: 32.15 KG/M2 | DIASTOLIC BLOOD PRESSURE: 74 MMHG

## 2019-07-05 PROCEDURE — 99214 OFFICE O/P EST MOD 30 MIN: CPT

## 2019-07-05 NOTE — PHYSICAL EXAM
[No Acute Distress] : no acute distress [Well Developed] : well developed [Well Nourished] : well nourished [Well-Appearing] : well-appearing [Normal Sclera/Conjunctiva] : normal sclera/conjunctiva [EOMI] : extraocular movements intact [PERRL] : pupils equal round and reactive to light [No JVD] : no jugular venous distention [Normal Oropharynx] : the oropharynx was normal [Normal Outer Ear/Nose] : the outer ears and nose were normal in appearance [Supple] : supple [Thyroid Normal, No Nodules] : the thyroid was normal and there were no nodules present [No Lymphadenopathy] : no lymphadenopathy [No Respiratory Distress] : no respiratory distress  [No Accessory Muscle Use] : no accessory muscle use [Clear to Auscultation] : lungs were clear to auscultation bilaterally [Normal Rate] : normal rate  [Regular Rhythm] : with a regular rhythm [Normal S1, S2] : normal S1 and S2 [No Murmur] : no murmur heard [No Carotid Bruits] : no carotid bruits [No Abdominal Bruit] : a ~M bruit was not heard ~T in the abdomen [No Varicosities] : no varicosities [No Edema] : there was no peripheral edema [Pedal Pulses Present] : the pedal pulses are present [No Extremity Clubbing/Cyanosis] : no extremity clubbing/cyanosis [No Palpable Aorta] : no palpable aorta [Soft] : abdomen soft [No Masses] : no abdominal mass palpated [Non-distended] : non-distended [Non Tender] : non-tender [Normal Bowel Sounds] : normal bowel sounds [Normal Posterior Cervical Nodes] : no posterior cervical lymphadenopathy [No HSM] : no HSM [No Spinal Tenderness] : no spinal tenderness [Normal Anterior Cervical Nodes] : no anterior cervical lymphadenopathy [No CVA Tenderness] : no CVA  tenderness [No Joint Swelling] : no joint swelling [Grossly Normal Strength/Tone] : grossly normal strength/tone [No Rash] : no rash [No Focal Deficits] : no focal deficits [Coordination Grossly Intact] : coordination grossly intact [Deep Tendon Reflexes (DTR)] : deep tendon reflexes were 2+ and symmetric [Normal Affect] : the affect was normal [Normal Gait] : normal gait [Normal Insight/Judgement] : insight and judgment were intact

## 2019-07-05 NOTE — HISTORY OF PRESENT ILLNESS
[de-identified] : Pt reports that he had an EGD with Dr. Burger. He is taking pantoprazole 40mg BID.\par Pathology was negative for H. Pylori.\par Feeling a little better.\par Pt is c/o cough over 1 week.

## 2019-08-19 ENCOUNTER — RX RENEWAL (OUTPATIENT)
Age: 84
End: 2019-08-19

## 2019-08-27 ENCOUNTER — APPOINTMENT (OUTPATIENT)
Dept: INTERNAL MEDICINE | Facility: CLINIC | Age: 84
End: 2019-08-27
Payer: MEDICARE

## 2019-08-27 VITALS
WEIGHT: 195 LBS | DIASTOLIC BLOOD PRESSURE: 70 MMHG | RESPIRATION RATE: 14 BRPM | BODY MASS INDEX: 32.49 KG/M2 | OXYGEN SATURATION: 90 % | HEART RATE: 71 BPM | HEIGHT: 65 IN | TEMPERATURE: 98.3 F | SYSTOLIC BLOOD PRESSURE: 110 MMHG

## 2019-08-27 DIAGNOSIS — K29.70 GASTRITIS, UNSPECIFIED, W/OUT BLEEDING: ICD-10-CM

## 2019-08-27 DIAGNOSIS — R14.0 ABDOMINAL DISTENSION (GASEOUS): ICD-10-CM

## 2019-08-27 PROCEDURE — 99214 OFFICE O/P EST MOD 30 MIN: CPT

## 2019-08-27 RX ORDER — AMOXICILLIN AND CLAVULANATE POTASSIUM 875; 125 MG/1; MG/1
875-125 TABLET, COATED ORAL
Qty: 20 | Refills: 0 | Status: DISCONTINUED | COMMUNITY
Start: 2019-05-29 | End: 2019-08-27

## 2019-08-27 RX ORDER — RIFAXIMIN 550 MG/1
550 TABLET ORAL
Qty: 10 | Refills: 0 | Status: DISCONTINUED | COMMUNITY
Start: 2018-02-13 | End: 2019-08-27

## 2019-08-27 RX ORDER — AZITHROMYCIN 250 MG/1
250 TABLET, FILM COATED ORAL
Qty: 1 | Refills: 0 | Status: DISCONTINUED | COMMUNITY
Start: 2019-07-05 | End: 2019-08-27

## 2019-08-27 NOTE — PHYSICAL EXAM
[No Acute Distress] : no acute distress [Well Developed] : well developed [Well Nourished] : well nourished [Normal Sclera/Conjunctiva] : normal sclera/conjunctiva [Well-Appearing] : well-appearing [PERRL] : pupils equal round and reactive to light [EOMI] : extraocular movements intact [Normal Oropharynx] : the oropharynx was normal [Normal Outer Ear/Nose] : the outer ears and nose were normal in appearance [No Lymphadenopathy] : no lymphadenopathy [No JVD] : no jugular venous distention [Thyroid Normal, No Nodules] : the thyroid was normal and there were no nodules present [Supple] : supple [No Respiratory Distress] : no respiratory distress  [Clear to Auscultation] : lungs were clear to auscultation bilaterally [No Accessory Muscle Use] : no accessory muscle use [Regular Rhythm] : with a regular rhythm [Normal Rate] : normal rate  [Normal S1, S2] : normal S1 and S2 [No Murmur] : no murmur heard [No Abdominal Bruit] : a ~M bruit was not heard ~T in the abdomen [No Carotid Bruits] : no carotid bruits [Pedal Pulses Present] : the pedal pulses are present [No Varicosities] : no varicosities [No Palpable Aorta] : no palpable aorta [No Edema] : there was no peripheral edema [Soft] : abdomen soft [No Extremity Clubbing/Cyanosis] : no extremity clubbing/cyanosis [Non-distended] : non-distended [Non Tender] : non-tender [No Masses] : no abdominal mass palpated [Normal Posterior Cervical Nodes] : no posterior cervical lymphadenopathy [Normal Bowel Sounds] : normal bowel sounds [No HSM] : no HSM [Normal Anterior Cervical Nodes] : no anterior cervical lymphadenopathy [No CVA Tenderness] : no CVA  tenderness [No Spinal Tenderness] : no spinal tenderness [No Joint Swelling] : no joint swelling [Grossly Normal Strength/Tone] : grossly normal strength/tone [Coordination Grossly Intact] : coordination grossly intact [No Rash] : no rash [No Focal Deficits] : no focal deficits [Normal Gait] : normal gait [Deep Tendon Reflexes (DTR)] : deep tendon reflexes were 2+ and symmetric [Normal Affect] : the affect was normal [Normal Insight/Judgement] : insight and judgment were intact

## 2019-10-03 ENCOUNTER — APPOINTMENT (OUTPATIENT)
Dept: INTERNAL MEDICINE | Facility: CLINIC | Age: 84
End: 2019-10-03
Payer: MEDICARE

## 2019-10-03 VITALS
SYSTOLIC BLOOD PRESSURE: 110 MMHG | WEIGHT: 195 LBS | HEART RATE: 95 BPM | DIASTOLIC BLOOD PRESSURE: 80 MMHG | OXYGEN SATURATION: 94 % | RESPIRATION RATE: 14 BRPM | BODY MASS INDEX: 32.49 KG/M2 | TEMPERATURE: 98.9 F | HEIGHT: 65 IN

## 2019-10-03 DIAGNOSIS — J20.9 ACUTE BRONCHITIS, UNSPECIFIED: ICD-10-CM

## 2019-10-03 DIAGNOSIS — J21.9 ACUTE BRONCHITIS, UNSPECIFIED: ICD-10-CM

## 2019-10-03 DIAGNOSIS — I10 ESSENTIAL (PRIMARY) HYPERTENSION: ICD-10-CM

## 2019-10-03 PROCEDURE — 99214 OFFICE O/P EST MOD 30 MIN: CPT

## 2019-10-03 RX ORDER — BENZONATATE 100 MG/1
100 CAPSULE ORAL 3 TIMES DAILY
Qty: 30 | Refills: 0 | Status: ACTIVE | COMMUNITY
Start: 2019-10-03 | End: 1900-01-01

## 2019-10-03 RX ORDER — DICYCLOMINE HYDROCHLORIDE 10 MG/1
10 CAPSULE ORAL
Qty: 60 | Refills: 1 | Status: DISCONTINUED | COMMUNITY
Start: 2019-08-27 | End: 2019-10-03

## 2019-10-03 NOTE — PHYSICAL EXAM
[No Acute Distress] : no acute distress [Well Developed] : well developed [Well Nourished] : well nourished [Well-Appearing] : well-appearing [Normal Sclera/Conjunctiva] : normal sclera/conjunctiva [PERRL] : pupils equal round and reactive to light [EOMI] : extraocular movements intact [Normal Outer Ear/Nose] : the outer ears and nose were normal in appearance [Normal Oropharynx] : the oropharynx was normal [No JVD] : no jugular venous distention [No Lymphadenopathy] : no lymphadenopathy [Thyroid Normal, No Nodules] : the thyroid was normal and there were no nodules present [Supple] : supple [No Respiratory Distress] : no respiratory distress  [Clear to Auscultation] : lungs were clear to auscultation bilaterally [Regular Rhythm] : with a regular rhythm [Normal Rate] : normal rate  [No Murmur] : no murmur heard [Normal S1, S2] : normal S1 and S2 [No Abdominal Bruit] : a ~M bruit was not heard ~T in the abdomen [No Carotid Bruits] : no carotid bruits [Pedal Pulses Present] : the pedal pulses are present [No Varicosities] : no varicosities [No Palpable Aorta] : no palpable aorta [No Edema] : there was no peripheral edema [No Extremity Clubbing/Cyanosis] : no extremity clubbing/cyanosis [Non Tender] : non-tender [Soft] : abdomen soft [No Masses] : no abdominal mass palpated [Non-distended] : non-distended [No HSM] : no HSM [Normal Bowel Sounds] : normal bowel sounds [Normal Posterior Cervical Nodes] : no posterior cervical lymphadenopathy [Normal Anterior Cervical Nodes] : no anterior cervical lymphadenopathy [No CVA Tenderness] : no CVA  tenderness [No Joint Swelling] : no joint swelling [No Spinal Tenderness] : no spinal tenderness [Grossly Normal Strength/Tone] : grossly normal strength/tone [No Rash] : no rash [Coordination Grossly Intact] : coordination grossly intact [No Focal Deficits] : no focal deficits [Normal Gait] : normal gait [Normal Affect] : the affect was normal [Deep Tendon Reflexes (DTR)] : deep tendon reflexes were 2+ and symmetric [Normal Insight/Judgement] : insight and judgment were intact [de-identified] : crackles at left lung base, otherwise CTA

## 2019-10-19 ENCOUNTER — APPOINTMENT (OUTPATIENT)
Dept: INTERNAL MEDICINE | Facility: CLINIC | Age: 84
End: 2019-10-19
Payer: MEDICARE

## 2019-10-19 ENCOUNTER — NON-APPOINTMENT (OUTPATIENT)
Age: 84
End: 2019-10-19

## 2019-10-19 VITALS
DIASTOLIC BLOOD PRESSURE: 72 MMHG | OXYGEN SATURATION: 95 % | SYSTOLIC BLOOD PRESSURE: 124 MMHG | RESPIRATION RATE: 14 BRPM | HEIGHT: 65 IN | TEMPERATURE: 98.5 F | BODY MASS INDEX: 32.49 KG/M2 | WEIGHT: 195 LBS | HEART RATE: 54 BPM

## 2019-10-19 DIAGNOSIS — R53.81 OTHER MALAISE: ICD-10-CM

## 2019-10-19 DIAGNOSIS — R07.89 OTHER CHEST PAIN: ICD-10-CM

## 2019-10-19 DIAGNOSIS — R10.13 EPIGASTRIC PAIN: ICD-10-CM

## 2019-10-19 DIAGNOSIS — R53.83 OTHER MALAISE: ICD-10-CM

## 2019-10-19 DIAGNOSIS — Z23 ENCOUNTER FOR IMMUNIZATION: ICD-10-CM

## 2019-10-19 PROCEDURE — 93000 ELECTROCARDIOGRAM COMPLETE: CPT

## 2019-10-19 PROCEDURE — 99214 OFFICE O/P EST MOD 30 MIN: CPT | Mod: 25

## 2019-10-19 PROCEDURE — G0008: CPT

## 2019-10-19 PROCEDURE — 36415 COLL VENOUS BLD VENIPUNCTURE: CPT

## 2019-10-19 PROCEDURE — 90662 IIV NO PRSV INCREASED AG IM: CPT

## 2019-10-19 RX ORDER — CEFUROXIME AXETIL 250 MG/1
250 TABLET ORAL
Qty: 20 | Refills: 0 | Status: DISCONTINUED | COMMUNITY
Start: 2019-10-03 | End: 2019-10-19

## 2019-10-19 NOTE — PHYSICAL EXAM
[No Acute Distress] : no acute distress [Well Developed] : well developed [Well Nourished] : well nourished [Well-Appearing] : well-appearing [PERRL] : pupils equal round and reactive to light [EOMI] : extraocular movements intact [Normal Sclera/Conjunctiva] : normal sclera/conjunctiva [No JVD] : no jugular venous distention [Normal Oropharynx] : the oropharynx was normal [Normal Outer Ear/Nose] : the outer ears and nose were normal in appearance [Supple] : supple [No Lymphadenopathy] : no lymphadenopathy [No Accessory Muscle Use] : no accessory muscle use [No Respiratory Distress] : no respiratory distress  [Thyroid Normal, No Nodules] : the thyroid was normal and there were no nodules present [Normal S1, S2] : normal S1 and S2 [Normal Rate] : normal rate  [Regular Rhythm] : with a regular rhythm [Clear to Auscultation] : lungs were clear to auscultation bilaterally [No Abdominal Bruit] : a ~M bruit was not heard ~T in the abdomen [No Murmur] : no murmur heard [No Carotid Bruits] : no carotid bruits [No Edema] : there was no peripheral edema [Pedal Pulses Present] : the pedal pulses are present [No Varicosities] : no varicosities [Soft] : abdomen soft [No Palpable Aorta] : no palpable aorta [No Extremity Clubbing/Cyanosis] : no extremity clubbing/cyanosis [No Masses] : no abdominal mass palpated [Non-distended] : non-distended [Non Tender] : non-tender [Normal Bowel Sounds] : normal bowel sounds [Normal Posterior Cervical Nodes] : no posterior cervical lymphadenopathy [No HSM] : no HSM [No Spinal Tenderness] : no spinal tenderness [Normal Anterior Cervical Nodes] : no anterior cervical lymphadenopathy [No CVA Tenderness] : no CVA  tenderness [No Joint Swelling] : no joint swelling [No Rash] : no rash [Grossly Normal Strength/Tone] : grossly normal strength/tone [Coordination Grossly Intact] : coordination grossly intact [No Focal Deficits] : no focal deficits [Normal Gait] : normal gait [Normal Affect] : the affect was normal [Deep Tendon Reflexes (DTR)] : deep tendon reflexes were 2+ and symmetric [Normal Insight/Judgement] : insight and judgment were intact

## 2019-10-19 NOTE — HISTORY OF PRESENT ILLNESS
[FreeTextEntry8] : Pt completed 10 days of cefuroxime. Still c.o chest congestion and an occasional cough. \par Not feeling short of breath. PT c/o fatigue and body aches.\par Pt reports that when he coughs or takes a deep breath, he feels a midline chest discomfort.

## 2019-10-30 LAB
ALBUMIN SERPL ELPH-MCNC: 4.4 G/DL
ALP BLD-CCNC: 69 U/L
ALT SERPL-CCNC: 13 U/L
ANION GAP SERPL CALC-SCNC: 16 MMOL/L
APPEARANCE: CLEAR
AST SERPL-CCNC: 20 U/L
BASOPHILS # BLD AUTO: 0.04 K/UL
BASOPHILS NFR BLD AUTO: 0.5 %
BILIRUB SERPL-MCNC: 0.5 MG/DL
BILIRUBIN URINE: NEGATIVE
BLOOD URINE: NORMAL
BUN SERPL-MCNC: 21 MG/DL
CALCIUM SERPL-MCNC: 10.6 MG/DL
CHLORIDE SERPL-SCNC: 101 MMOL/L
CO2 SERPL-SCNC: 24 MMOL/L
COLOR: YELLOW
CREAT SERPL-MCNC: 1.12 MG/DL
CRP SERPL-MCNC: 2.02 MG/DL
EOSINOPHIL # BLD AUTO: 0.07 K/UL
EOSINOPHIL NFR BLD AUTO: 0.9 %
FERRITIN SERPL-MCNC: 278 NG/ML
FOLATE SERPL-MCNC: >20 NG/ML
GLUCOSE QUALITATIVE U: NEGATIVE
GLUCOSE SERPL-MCNC: 81 MG/DL
HCT VFR BLD CALC: 45.6 %
HGB BLD-MCNC: 14.3 G/DL
IMM GRANULOCYTES NFR BLD AUTO: 0.4 %
IRON SATN MFR SERPL: 15 %
IRON SERPL-MCNC: 43 UG/DL
KETONES URINE: NEGATIVE
LEUKOCYTE ESTERASE URINE: NEGATIVE
LYMPHOCYTES # BLD AUTO: 2.34 K/UL
LYMPHOCYTES NFR BLD AUTO: 29 %
MAN DIFF?: NORMAL
MCHC RBC-ENTMCNC: 28.3 PG
MCHC RBC-ENTMCNC: 31.4 GM/DL
MCV RBC AUTO: 90.1 FL
MONOCYTES # BLD AUTO: 1.11 K/UL
MONOCYTES NFR BLD AUTO: 13.8 %
NEUTROPHILS # BLD AUTO: 4.48 K/UL
NEUTROPHILS NFR BLD AUTO: 55.4 %
NITRITE URINE: NEGATIVE
PH URINE: 6
PLATELET # BLD AUTO: 234 K/UL
POTASSIUM SERPL-SCNC: 5 MMOL/L
PROT SERPL-MCNC: 7.3 G/DL
PROTEIN URINE: NORMAL
RBC # BLD: 5.06 M/UL
RBC # FLD: 13.2 %
SODIUM SERPL-SCNC: 141 MMOL/L
SPECIFIC GRAVITY URINE: 1.02
TIBC SERPL-MCNC: 292 UG/DL
UIBC SERPL-MCNC: 249 UG/DL
UROBILINOGEN URINE: NORMAL
VIT B12 SERPL-MCNC: 467 PG/ML
WBC # FLD AUTO: 8.07 K/UL

## 2019-11-07 ENCOUNTER — INPATIENT (INPATIENT)
Facility: HOSPITAL | Age: 84
LOS: 4 days | Discharge: HOME | DRG: 204 | End: 2019-11-12
Attending: HOSPITALIST | Admitting: STUDENT IN AN ORGANIZED HEALTH CARE EDUCATION/TRAINING PROGRAM
Payer: MEDICARE

## 2019-11-07 ENCOUNTER — APPOINTMENT (OUTPATIENT)
Dept: INTERNAL MEDICINE | Facility: CLINIC | Age: 84
End: 2019-11-07
Payer: MEDICARE

## 2019-11-07 VITALS
WEIGHT: 195 LBS | RESPIRATION RATE: 15 BRPM | HEART RATE: 113 BPM | DIASTOLIC BLOOD PRESSURE: 90 MMHG | TEMPERATURE: 98.6 F | BODY MASS INDEX: 32.45 KG/M2 | OXYGEN SATURATION: 93 % | SYSTOLIC BLOOD PRESSURE: 140 MMHG

## 2019-11-07 VITALS
HEART RATE: 102 BPM | HEIGHT: 67 IN | SYSTOLIC BLOOD PRESSURE: 139 MMHG | RESPIRATION RATE: 18 BRPM | TEMPERATURE: 98 F | DIASTOLIC BLOOD PRESSURE: 83 MMHG | OXYGEN SATURATION: 93 % | WEIGHT: 195.11 LBS

## 2019-11-07 DIAGNOSIS — R91.8 OTHER NONSPECIFIC ABNORMAL FINDING OF LUNG FIELD: ICD-10-CM

## 2019-11-07 DIAGNOSIS — K29.70 GASTRITIS, UNSPECIFIED, WITHOUT BLEEDING: ICD-10-CM

## 2019-11-07 DIAGNOSIS — I10 ESSENTIAL (PRIMARY) HYPERTENSION: ICD-10-CM

## 2019-11-07 DIAGNOSIS — E78.5 HYPERLIPIDEMIA, UNSPECIFIED: ICD-10-CM

## 2019-11-07 DIAGNOSIS — R52 PAIN, UNSPECIFIED: ICD-10-CM

## 2019-11-07 DIAGNOSIS — R50.9 FEVER, UNSPECIFIED: ICD-10-CM

## 2019-11-07 DIAGNOSIS — R79.89 OTHER SPECIFIED ABNORMAL FINDINGS OF BLOOD CHEMISTRY: ICD-10-CM

## 2019-11-07 DIAGNOSIS — G45.9 TRANSIENT CEREBRAL ISCHEMIC ATTACK, UNSPECIFIED: ICD-10-CM

## 2019-11-07 DIAGNOSIS — Z29.9 ENCOUNTER FOR PROPHYLACTIC MEASURES, UNSPECIFIED: ICD-10-CM

## 2019-11-07 LAB
ALBUMIN SERPL ELPH-MCNC: 3.1 G/DL — LOW (ref 3.3–5)
ALP SERPL-CCNC: 65 U/L — SIGNIFICANT CHANGE UP (ref 40–120)
ALT FLD-CCNC: 20 U/L — SIGNIFICANT CHANGE UP (ref 12–78)
ANION GAP SERPL CALC-SCNC: 8 MMOL/L — SIGNIFICANT CHANGE UP (ref 5–17)
APPEARANCE UR: CLEAR — SIGNIFICANT CHANGE UP
AST SERPL-CCNC: 27 U/L — SIGNIFICANT CHANGE UP (ref 15–37)
BACTERIA # UR AUTO: ABNORMAL
BASOPHILS # BLD AUTO: 0.05 K/UL — SIGNIFICANT CHANGE UP (ref 0–0.2)
BASOPHILS NFR BLD AUTO: 0.6 % — SIGNIFICANT CHANGE UP (ref 0–2)
BILIRUB SERPL-MCNC: 0.5 MG/DL — SIGNIFICANT CHANGE UP (ref 0.2–1.2)
BILIRUB UR QL STRIP: NORMAL
BILIRUB UR-MCNC: NEGATIVE — SIGNIFICANT CHANGE UP
BUN SERPL-MCNC: 19 MG/DL — SIGNIFICANT CHANGE UP (ref 7–23)
CALCIUM SERPL-MCNC: 9.7 MG/DL — SIGNIFICANT CHANGE UP (ref 8.5–10.1)
CHLORIDE SERPL-SCNC: 104 MMOL/L — SIGNIFICANT CHANGE UP (ref 96–108)
CLARITY UR: CLEAR
CO2 SERPL-SCNC: 26 MMOL/L — SIGNIFICANT CHANGE UP (ref 22–31)
COLLECTION METHOD: NORMAL
COLOR SPEC: SIGNIFICANT CHANGE UP
CREAT SERPL-MCNC: 1.2 MG/DL — SIGNIFICANT CHANGE UP (ref 0.5–1.3)
DIFF PNL FLD: ABNORMAL
EOSINOPHIL # BLD AUTO: 0.03 K/UL — SIGNIFICANT CHANGE UP (ref 0–0.5)
EOSINOPHIL NFR BLD AUTO: 0.4 % — SIGNIFICANT CHANGE UP (ref 0–6)
EPI CELLS # UR: SIGNIFICANT CHANGE UP
FLUAV SPEC QL CULT: NORMAL
FLUBV AG SPEC QL IA: NORMAL
GLUCOSE SERPL-MCNC: 144 MG/DL — HIGH (ref 70–99)
GLUCOSE UR QL: NEGATIVE — SIGNIFICANT CHANGE UP
GLUCOSE UR-MCNC: NORMAL
HCG UR QL: 0.2 EU/DL
HCT VFR BLD CALC: 42.5 % — SIGNIFICANT CHANGE UP (ref 39–50)
HGB BLD-MCNC: 13.7 G/DL — SIGNIFICANT CHANGE UP (ref 13–17)
HGB UR QL STRIP.AUTO: NORMAL
IMM GRANULOCYTES NFR BLD AUTO: 0.4 % — SIGNIFICANT CHANGE UP (ref 0–1.5)
KETONES UR-MCNC: NEGATIVE — SIGNIFICANT CHANGE UP
KETONES UR-MCNC: NORMAL
LACTATE SERPL-SCNC: 2.6 MMOL/L — HIGH (ref 0.7–2)
LEUKOCYTE ESTERASE UR QL STRIP: NORMAL
LEUKOCYTE ESTERASE UR-ACNC: NEGATIVE — SIGNIFICANT CHANGE UP
LYMPHOCYTES # BLD AUTO: 1.95 K/UL — SIGNIFICANT CHANGE UP (ref 1–3.3)
LYMPHOCYTES # BLD AUTO: 24.2 % — SIGNIFICANT CHANGE UP (ref 13–44)
MCHC RBC-ENTMCNC: 27.9 PG — SIGNIFICANT CHANGE UP (ref 27–34)
MCHC RBC-ENTMCNC: 32.2 GM/DL — SIGNIFICANT CHANGE UP (ref 32–36)
MCV RBC AUTO: 86.6 FL — SIGNIFICANT CHANGE UP (ref 80–100)
MONOCYTES # BLD AUTO: 0.98 K/UL — HIGH (ref 0–0.9)
MONOCYTES NFR BLD AUTO: 12.1 % — SIGNIFICANT CHANGE UP (ref 2–14)
NEUTROPHILS # BLD AUTO: 5.03 K/UL — SIGNIFICANT CHANGE UP (ref 1.8–7.4)
NEUTROPHILS NFR BLD AUTO: 62.3 % — SIGNIFICANT CHANGE UP (ref 43–77)
NITRITE UR QL STRIP: NORMAL
NITRITE UR-MCNC: NEGATIVE — SIGNIFICANT CHANGE UP
NRBC # BLD: 0 /100 WBCS — SIGNIFICANT CHANGE UP (ref 0–0)
PH UR STRIP: 6
PH UR: 6.5 — SIGNIFICANT CHANGE UP (ref 5–8)
PLATELET # BLD AUTO: 321 K/UL — SIGNIFICANT CHANGE UP (ref 150–400)
POTASSIUM SERPL-MCNC: 3.9 MMOL/L — SIGNIFICANT CHANGE UP (ref 3.5–5.3)
POTASSIUM SERPL-SCNC: 3.9 MMOL/L — SIGNIFICANT CHANGE UP (ref 3.5–5.3)
PROT SERPL-MCNC: 7.4 G/DL — SIGNIFICANT CHANGE UP (ref 6–8.3)
PROT UR STRIP-MCNC: 30
PROT UR-MCNC: NEGATIVE — SIGNIFICANT CHANGE UP
RBC # BLD: 4.91 M/UL — SIGNIFICANT CHANGE UP (ref 4.2–5.8)
RBC # FLD: 12.6 % — SIGNIFICANT CHANGE UP (ref 10.3–14.5)
RBC CASTS # UR COMP ASSIST: SIGNIFICANT CHANGE UP /HPF (ref 0–4)
SODIUM SERPL-SCNC: 138 MMOL/L — SIGNIFICANT CHANGE UP (ref 135–145)
SP GR SPEC: 1 — LOW (ref 1.01–1.02)
SP GR UR STRIP: 1.02
UROBILINOGEN FLD QL: NEGATIVE — SIGNIFICANT CHANGE UP
WBC # BLD: 8.07 K/UL — SIGNIFICANT CHANGE UP (ref 3.8–10.5)
WBC # FLD AUTO: 8.07 K/UL — SIGNIFICANT CHANGE UP (ref 3.8–10.5)
WBC UR QL: SIGNIFICANT CHANGE UP

## 2019-11-07 PROCEDURE — 93010 ELECTROCARDIOGRAM REPORT: CPT

## 2019-11-07 PROCEDURE — 71250 CT THORAX DX C-: CPT | Mod: 26

## 2019-11-07 PROCEDURE — 99214 OFFICE O/P EST MOD 30 MIN: CPT | Mod: 25

## 2019-11-07 PROCEDURE — 99285 EMERGENCY DEPT VISIT HI MDM: CPT

## 2019-11-07 PROCEDURE — 87804 INFLUENZA ASSAY W/OPTIC: CPT | Mod: 59,QW

## 2019-11-07 PROCEDURE — 99223 1ST HOSP IP/OBS HIGH 75: CPT | Mod: GC,AI

## 2019-11-07 PROCEDURE — 81003 URINALYSIS AUTO W/O SCOPE: CPT | Mod: QW

## 2019-11-07 RX ORDER — SODIUM CHLORIDE 9 MG/ML
700 INJECTION INTRAMUSCULAR; INTRAVENOUS; SUBCUTANEOUS ONCE
Refills: 0 | Status: COMPLETED | OUTPATIENT
Start: 2019-11-07 | End: 2019-11-07

## 2019-11-07 RX ORDER — SODIUM CHLORIDE 9 MG/ML
1000 INJECTION INTRAMUSCULAR; INTRAVENOUS; SUBCUTANEOUS ONCE
Refills: 0 | Status: COMPLETED | OUTPATIENT
Start: 2019-11-07 | End: 2019-11-07

## 2019-11-07 RX ORDER — SODIUM CHLORIDE 9 MG/ML
1000 INJECTION INTRAMUSCULAR; INTRAVENOUS; SUBCUTANEOUS
Refills: 0 | Status: DISCONTINUED | OUTPATIENT
Start: 2019-11-08 | End: 2019-11-12

## 2019-11-07 RX ORDER — METOPROLOL TARTRATE 50 MG
25 TABLET ORAL DAILY
Refills: 0 | Status: DISCONTINUED | OUTPATIENT
Start: 2019-11-07 | End: 2019-11-08

## 2019-11-07 RX ORDER — ENOXAPARIN SODIUM 100 MG/ML
40 INJECTION SUBCUTANEOUS DAILY
Refills: 0 | Status: DISCONTINUED | OUTPATIENT
Start: 2019-11-07 | End: 2019-11-10

## 2019-11-07 RX ORDER — IPRATROPIUM/ALBUTEROL SULFATE 18-103MCG
3 AEROSOL WITH ADAPTER (GRAM) INHALATION ONCE
Refills: 0 | Status: COMPLETED | OUTPATIENT
Start: 2019-11-07 | End: 2019-11-07

## 2019-11-07 RX ORDER — ACETAMINOPHEN 500 MG
650 TABLET ORAL ONCE
Refills: 0 | Status: COMPLETED | OUTPATIENT
Start: 2019-11-07 | End: 2019-11-07

## 2019-11-07 RX ORDER — CLOPIDOGREL BISULFATE 75 MG/1
75 TABLET, FILM COATED ORAL DAILY
Refills: 0 | Status: DISCONTINUED | OUTPATIENT
Start: 2019-11-07 | End: 2019-11-08

## 2019-11-07 RX ORDER — ATORVASTATIN CALCIUM 80 MG/1
20 TABLET, FILM COATED ORAL AT BEDTIME
Refills: 0 | Status: DISCONTINUED | OUTPATIENT
Start: 2019-11-07 | End: 2019-11-12

## 2019-11-07 RX ORDER — PANTOPRAZOLE SODIUM 20 MG/1
40 TABLET, DELAYED RELEASE ORAL
Refills: 0 | Status: DISCONTINUED | OUTPATIENT
Start: 2019-11-07 | End: 2019-11-12

## 2019-11-07 RX ADMIN — SODIUM CHLORIDE 1000 MILLILITER(S): 9 INJECTION INTRAMUSCULAR; INTRAVENOUS; SUBCUTANEOUS at 20:40

## 2019-11-07 RX ADMIN — SODIUM CHLORIDE 2000 MILLILITER(S): 9 INJECTION INTRAMUSCULAR; INTRAVENOUS; SUBCUTANEOUS at 19:40

## 2019-11-07 RX ADMIN — SODIUM CHLORIDE 1400 MILLILITER(S): 9 INJECTION INTRAMUSCULAR; INTRAVENOUS; SUBCUTANEOUS at 20:30

## 2019-11-07 RX ADMIN — Medication 3 MILLILITER(S): at 20:58

## 2019-11-07 RX ADMIN — Medication 30 MILLILITER(S): at 22:56

## 2019-11-07 RX ADMIN — PANTOPRAZOLE SODIUM 40 MILLIGRAM(S): 20 TABLET, DELAYED RELEASE ORAL at 22:34

## 2019-11-07 NOTE — H&P ADULT - NSICDXPASTSURGICALHX_GEN_ALL_CORE_FT
PAST SURGICAL HISTORY:  History of cataract surgery bilateral    Pacemaker [employer] Model # [id number] Serial #[id number]  implant date 05/20/2011    S/P cholecystectomy

## 2019-11-07 NOTE — ED ADULT NURSE NOTE - PSH
History of cataract surgery  bilateral  Pacemaker  [employer] Model # [id number] Serial #[id number]  implant date 05/20/2011  S/P cholecystectomy

## 2019-11-07 NOTE — H&P ADULT - PROBLEM SELECTOR PLAN 1
- Pt presenting after outpatient CXR with concerning results  - CT chest demonstrating right apical nodular mass presumably a pleural-based mass/malignancy with small right pleural effusion. New finding as CXR 1mo ago negative.  - Admit to F  - Consult pulm Dr. Swartz, f/u recs - Pt presenting after outpatient CXR with concerning results  - CT chest demonstrating right apical nodular mass presumably a pleural-based mass/malignancy with small right pleural effusion. New finding as CXR 1mo ago negative.  - Admit to [facility]  - Consult pulm Dr. Swartz, f/u recs  - Consult palliative nursing for goals of care conversation

## 2019-11-07 NOTE — ADDENDUM
[FreeTextEntry1] : CXR abnormal with right pleural effusion and large RUL density\par Discussed with pt and daughter.Pt will go to the ER now for evaluation.

## 2019-11-07 NOTE — H&P ADULT - ASSESSMENT
89yo M, w/ PMH/o HTN, HLD, carotid artery disease, heart block s/p PPM, h/o TIAs, h/o prostate cancer, h/o diverticulitis, gastritis, presenting to the ED at the instruction of PCP Dr. Amado recommending chest CT after outpatient CXR today with concerning results, admitted for work up of new lung mass.

## 2019-11-07 NOTE — ED ADULT NURSE NOTE - PMH
Carpal tunnel syndrome    Diverticulitis    GERD (gastroesophageal reflux disease)    Heart block    HTN (hypertension)    Hyperlipidemia    Pacemaker  2007  Prostate cancer    TIA (transient ischemic attack)  last TIA over 5 years ago as per patient

## 2019-11-07 NOTE — ED PROVIDER NOTE - FAMILY HISTORY
Procedures     [facility]   Dr Patterson  Pre-op Dx abnormal stress test  Post-op Dx same  Specimen none  EBL < 50 cc    8/23/17 - [facility] - EDP 8, normal coronaries  Medical Rx, angioseal  Home today   No pertinent family history in first degree relatives

## 2019-11-07 NOTE — ED PROVIDER NOTE - CHPI ED SYMPTOMS NEG
no vomiting/no chills/no syncope/no dizziness/no nausea/no back pain/no shortness of breath/no diaphoresis

## 2019-11-07 NOTE — H&P ADULT - NSHPSOCIALHISTORY_GEN_ALL_CORE
Lives at home with wife  Ambulates independently  Performs ADLs independently  Former smoker, quit 50yrs ago, smoked 1ppd  Denies EtOH and rec drug use  Reportedly received flu vaccine this season and PNA vaccine in the past

## 2019-11-07 NOTE — ED PROVIDER NOTE - PSH
History of cataract surgery  bilateral  Pacemaker  [employer] Model # [id number] Serial #[id number]  implant date 05/20/2011  S/P cholecystectomy

## 2019-11-07 NOTE — ED PROVIDER NOTE - OBJECTIVE STATEMENT
pt is a 89 yo male hx pt with hx of heart block and ppm. pt states not feeling well over past month with cough and saw dr hilton in past and xray neg, pt states now over past 3 days with fever, chest congestion, constant anterior discomfort, pt saw dr hilton today and sent for cxr and then sent to er for ct scan chest and further evaluation.  no sob, n/v, leg pain or swelling    no a/a factors  pmd: yobani  cards: wil

## 2019-11-07 NOTE — H&P ADULT - PROBLEM SELECTOR PLAN 2
- Lactate 2.6, s/p 2.7L in the ED  - F/u repeat lactate, blood cultures  - Pt with reccurent fevers though no leukocytosis, signs/sx's concerning for PNA  - Monitor fever curve and daily CBC off abx - Lactate 2.6, s/p 2.7L in the ED  - Likely 2/2 possible malignancy  - F/u blood cultures  - Pt with reccurent fevers though no leukocytosis, signs/sx's concerning for PNA  - Monitor fever curve and daily CBC off abx

## 2019-11-07 NOTE — PHYSICAL EXAM
[No Acute Distress] : no acute distress [Well Nourished] : well nourished [Well Developed] : well developed [Well-Appearing] : well-appearing [PERRL] : pupils equal round and reactive to light [Normal Sclera/Conjunctiva] : normal sclera/conjunctiva [EOMI] : extraocular movements intact [Normal Oropharynx] : the oropharynx was normal [Normal Outer Ear/Nose] : the outer ears and nose were normal in appearance [No JVD] : no jugular venous distention [Supple] : supple [No Lymphadenopathy] : no lymphadenopathy [Thyroid Normal, No Nodules] : the thyroid was normal and there were no nodules present [No Respiratory Distress] : no respiratory distress  [No Accessory Muscle Use] : no accessory muscle use [Clear to Auscultation] : lungs were clear to auscultation bilaterally [Normal Rate] : normal rate  [Normal S1, S2] : normal S1 and S2 [Regular Rhythm] : with a regular rhythm [No Murmur] : no murmur heard [No Carotid Bruits] : no carotid bruits [No Abdominal Bruit] : a ~M bruit was not heard ~T in the abdomen [No Varicosities] : no varicosities [Pedal Pulses Present] : the pedal pulses are present [No Edema] : there was no peripheral edema [No Palpable Aorta] : no palpable aorta [No Extremity Clubbing/Cyanosis] : no extremity clubbing/cyanosis [Soft] : abdomen soft [Non Tender] : non-tender [Non-distended] : non-distended [No HSM] : no HSM [No Masses] : no abdominal mass palpated [Normal Bowel Sounds] : normal bowel sounds [Normal Anterior Cervical Nodes] : no anterior cervical lymphadenopathy [Normal Posterior Cervical Nodes] : no posterior cervical lymphadenopathy [No CVA Tenderness] : no CVA  tenderness [No Spinal Tenderness] : no spinal tenderness [No Joint Swelling] : no joint swelling [Grossly Normal Strength/Tone] : grossly normal strength/tone [No Rash] : no rash [Coordination Grossly Intact] : coordination grossly intact [No Focal Deficits] : no focal deficits [Normal Gait] : normal gait [Deep Tendon Reflexes (DTR)] : deep tendon reflexes were 2+ and symmetric [Normal Affect] : the affect was normal [Normal Insight/Judgement] : insight and judgment were intact

## 2019-11-07 NOTE — ED ADULT NURSE NOTE - NSIMPLEMENTINTERV_GEN_ALL_ED
Implemented All Fall with Harm Risk Interventions:  [facility] to call system. Call bell, personal items and telephone within reach. Instruct patient to call for assistance. Room bathroom lighting operational. Non-slip footwear when patient is off stretcher. Physically safe environment: no spills, clutter or unnecessary equipment. Stretcher in lowest position, wheels locked, appropriate side rails in place. Provide visual cue, wrist band, yellow gown, etc. Monitor gait and stability. Monitor for mental status changes and reorient to person, place, and time. Review medications for side effects contributing to fall risk. Reinforce activity limits and safety measures with patient and family. Provide visual clues: red socks.

## 2019-11-07 NOTE — ED ADULT TRIAGE NOTE - CHIEF COMPLAINT QUOTE
patient came in ED sent by Dr Amado with c/o chest congestion X 3 weeks with weakness and body aches.

## 2019-11-07 NOTE — H&P ADULT - HISTORY OF PRESENT ILLNESS
In the ED  VS T 97.9 /94 HR 92 RR 16 SpO2 95%  Labs WNL.  CT chest: Right apical nodular mass presumably a pleural-based mass/malignancy   measuring up to 10 cm (CC) x 8 cm (TX) x 4.3 cm (AP). Nodular mass   extends along the pleural surface, mediastinal surface, and up to the   right hilum. Nodularity also seen along the surface of the right   hemidiaphragm. Small right pleural effusion. Considerations include mesothelioma, lymphoma, direct extension from bronchogenic carcinoma   which is difficult to appreciate on this unenhanced exam, or pleural-based metastases.  EKG: sinus rhythm with premature supraventricular complexes, LAD, nonspecific intraventricular block, age-undetermined possible lateral and anterior infarct    Received 2.7L NS, 1x DuoNeb. 89yo M, w/ PMH/o HTN, HLD, carotid artery disease, heart block s/p PPM, h/o TIAs (last episode ~5yrs ago), h/o prostate cancer, h/o diverticulitis, gastritis, presenting to the ED at the instruction of PCP Dr. Amado recommending chest CT after outpatient CXR today with concerning results. Pt was in his normal state of health until ~1mo ago when he noticed generalized weakness and feeling unwell. He expresses "getting ready in the AM is a chore." He presented to his PCP who appreciated crackles per pt report and pt was sent for CXR that was negative. At that time, pt was prescribed abx for possible occult PNA. Pt's symptoms were not relieved with abx therapy and was unable to follow up with chest CT for insurance reasons. Now pt c/o fevers of 3-day duration with Tmax at home 100.5. Fevers are responsive to Tylenol but recur. In addition to fevers, pt also c/o frequent gas-like pressure in his chest relieved with belching. Pt presented to his PCP again today for further evaluation of fevers and lack of improvement of described symptoms and completed CXR prior to ED visit. No family h/o lung cancer. Pt previously worked in [employer] industry. Endorses chills and chronic constipation, but otherwise denies unexplainable weight loss, new headaches, vision changes, chest pain, palpitations, cough, SOB, abdominal pain, N/V/D, urinary symptoms, new myalgia/arthralgia.    In the ED  VS T 97.9 /94 HR 92 RR 16 SpO2 95%  Labs WNL.  CT chest: Right apical nodular mass presumably a pleural-based mass/malignancy   measuring up to 10 cm (CC) x 8 cm (TX) x 4.3 cm (AP). Nodular mass   extends along the pleural surface, mediastinal surface, and up to the   right hilum. Nodularity also seen along the surface of the right   hemidiaphragm. Small right pleural effusion. Considerations include mesothelioma, lymphoma, direct extension from bronchogenic carcinoma   which is difficult to appreciate on this unenhanced exam, or pleural-based metastases.  EKG: sinus rhythm with premature supraventricular complexes, LAD, nonspecific intraventricular block, age-undetermined possible lateral and anterior infarct    Received 2.7L NS, 1x DuoNeb.

## 2019-11-07 NOTE — H&P ADULT - PROBLEM SELECTOR PLAN 7
IMPROVE VTE Individual Risk Assessment          RISK                                                          Points  [  ] Previous VTE                                                3  [  ] Thrombophilia                                             2  [  ] Lower limb paralysis                                   2        (unable to hold up >15 seconds)    [  ] Current Cancer                                             2         (within 6 months)  [  ] Immobilization > 24 hrs                              1  [  ] ICU/CCU stay > 24 hours                             1  [ x ] Age > 60                                                         1    IMPROVE VTE Score: 1    - DVT ppx with SCDs for now until evaluated by pulm. If no procedural intervention need, begin pharmacologic ppx.

## 2019-11-07 NOTE — H&P ADULT - NSHPPHYSICALEXAM_GEN_ALL_CORE
T(C): 36.6 (11-07-19 @ 21:03), Max: 36.6 (11-07-19 @ 18:15)  HR: 92 (11-07-19 @ 21:03) (92 - 102)  BP: 186/94 (11-07-19 @ 21:03) (139/83 - 186/94)  RR: 16 (11-07-19 @ 21:03) (16 - 18)  SpO2: 95% (11-07-19 @ 21:03) (93% - 95%)    GENERAL: patient appears well, no acute distress  EYES: sclera clear, no exudates  ENMT: oropharynx clear without erythema, no exudates, moist mucous membranes  NECK: supple, soft, no thyromegaly noted  LUNGS: good air entry L>R, clear to auscultation, no wheezing or rhonchi appreciated  HEART: soft S1/S2, regular rate and rhythm, no murmurs noted, no lower extremity edema  GASTROINTESTINAL: abdomen is soft, nontender, nondistended, normoactive bowel sounds, no palpable masses  INTEGUMENT: good skin turgor, no lesions noted, few seborrheic keratoses on upper back  MUSCULOSKELETAL: no clubbing or cyanosis, no obvious deformity  NEUROLOGIC: awake, alert, oriented x3, good muscle tone in 4 extremities, no obvious sensory deficits  PSYCHIATRIC: mood is good, affect is congruent, linear and logical thought process  HEME/LYMPH: no palpable cervical/supraclavicular nodules, no obvious ecchymosis or petechiae

## 2019-11-07 NOTE — H&P ADULT - PROBLEM SELECTOR PLAN 5
- H/o TIAs that manifested as forgetfulness, last episode approx. 5 years ago  - Continue Plavix 75mg and statin daily

## 2019-11-07 NOTE — ED PROVIDER NOTE - CLINICAL SUMMARY MEDICAL DECISION MAKING FREE TEXT BOX
pt sent in for fever and ? pn. check labs, bc, ct chest, also with vague chest congestion, check ekg, trop pt sent in for fever and ? pn. check labs, bc, ct chest, also with vague chest congestion, check ekg, trop--ct with rapid growth lung mass, elevated lactate, admit, ivf, pulm consult in am,  duoneb

## 2019-11-07 NOTE — H&P ADULT - NSHPREVIEWOFSYSTEMS_GEN_ALL_CORE
CONSTITUTIONAL: admits fever, chills, weakness; denies unexplainable weight loss  HEENT: denies blurred vision, sore throat  SKIN: denies new lesions, rash  CARDIOVASCULAR: denies chest pain, chest pressure, palpitations  RESPIRATORY: denies shortness of breath, sputum production  GASTROINTESTINAL: admits gas pressure in chest and chronic constipation; denies nausea, vomiting, diarrhea, abdominal pain  GENITOURINARY: denies dysuria, discharge  NEUROLOGICAL: denies numbness, headache, focal weakness  MUSCULOSKELETAL: denies new joint pain, muscle aches  HEMATOLOGIC: denies gross bleeding, bruising  LYMPHATICS: denies enlarged lymph nodes, extremity swelling

## 2019-11-07 NOTE — H&P ADULT - NSCORESITESY/N_GEN_A_CORE_RD
PRE-SEDATION ASSESSMENT    CONSENT  Consent for procedure and sedation obtained: Yes    MEDICAL HISTORY  Significant medical/surgical history: Yes  Past Complications with Sedation/Anesthesia: No  Significant Family History: No  Smoking History: No  Alcohol/Drug abuse: No  Possible Pregnancy (LMP): Not Applicable  Cardiac History: Yes  Respiratory History: No    PHYSICAL EXAM  Airway Risk History: No previous history  Airway Anatomy : Class III  Heart : Normal  Lungs : Normal  LOC/Mental Status : Normal    OTHER FINDINGS  Reviewed current medications and allergies: Yes  Pertinent lab/diagnostic test reviewed: Yes    SEDATION RISK ASSESSMENT  Risk Status ASA: Class III - Severe systemic disease, limits activity, is not incapacitated  Plan for Sedation: Moderate Sedation  Indications for Procedure/Pre-Procedure Diagnosis and Planned Procedure: Melena    NARRATIVE FINDINGS      Yes

## 2019-11-07 NOTE — H&P ADULT - PROBLEM SELECTOR PLAN 3
- Chronic, stable  - Continue home Metoprolol daily with hold parameters  - Monitor routine hemodynamics

## 2019-11-08 LAB
ALBUMIN SERPL ELPH-MCNC: 2.6 G/DL — LOW (ref 3.3–5)
ALP SERPL-CCNC: 57 U/L — SIGNIFICANT CHANGE UP (ref 40–120)
ALT FLD-CCNC: 17 U/L — SIGNIFICANT CHANGE UP (ref 12–78)
ANION GAP SERPL CALC-SCNC: 5 MMOL/L — SIGNIFICANT CHANGE UP (ref 5–17)
AST SERPL-CCNC: 26 U/L — SIGNIFICANT CHANGE UP (ref 15–37)
BASE EXCESS BLDV CALC-SCNC: 3.3 MMOL/L — HIGH (ref -2–2)
BASOPHILS # BLD AUTO: 0.03 K/UL — SIGNIFICANT CHANGE UP (ref 0–0.2)
BASOPHILS NFR BLD AUTO: 0.4 % — SIGNIFICANT CHANGE UP (ref 0–2)
BILIRUB SERPL-MCNC: 0.5 MG/DL — SIGNIFICANT CHANGE UP (ref 0.2–1.2)
BUN SERPL-MCNC: 16 MG/DL — SIGNIFICANT CHANGE UP (ref 7–23)
CALCIUM SERPL-MCNC: 8.8 MG/DL — SIGNIFICANT CHANGE UP (ref 8.5–10.1)
CEA SERPL-MCNC: 2.8 NG/ML — SIGNIFICANT CHANGE UP (ref 0–3.8)
CHLORIDE SERPL-SCNC: 109 MMOL/L — HIGH (ref 96–108)
CO2 SERPL-SCNC: 28 MMOL/L — SIGNIFICANT CHANGE UP (ref 22–31)
CREAT SERPL-MCNC: 0.94 MG/DL — SIGNIFICANT CHANGE UP (ref 0.5–1.3)
EOSINOPHIL # BLD AUTO: 0.03 K/UL — SIGNIFICANT CHANGE UP (ref 0–0.5)
EOSINOPHIL NFR BLD AUTO: 0.4 % — SIGNIFICANT CHANGE UP (ref 0–6)
GLUCOSE SERPL-MCNC: 102 MG/DL — HIGH (ref 70–99)
HCO3 BLDV-SCNC: 26 MMOL/L — SIGNIFICANT CHANGE UP (ref 21–29)
HCT VFR BLD CALC: 38.4 % — LOW (ref 39–50)
HGB BLD-MCNC: 12.1 G/DL — LOW (ref 13–17)
HOROWITZ INDEX BLDV+IHG-RTO: 21 — SIGNIFICANT CHANGE UP
IMM GRANULOCYTES NFR BLD AUTO: 0.5 % — SIGNIFICANT CHANGE UP (ref 0–1.5)
LDH SERPL L TO P-CCNC: 460 U/L — HIGH (ref 50–242)
LYMPHOCYTES # BLD AUTO: 2.01 K/UL — SIGNIFICANT CHANGE UP (ref 1–3.3)
LYMPHOCYTES # BLD AUTO: 25.5 % — SIGNIFICANT CHANGE UP (ref 13–44)
MCHC RBC-ENTMCNC: 27.5 PG — SIGNIFICANT CHANGE UP (ref 27–34)
MCHC RBC-ENTMCNC: 31.5 GM/DL — LOW (ref 32–36)
MCV RBC AUTO: 87.3 FL — SIGNIFICANT CHANGE UP (ref 80–100)
MONOCYTES # BLD AUTO: 1.09 K/UL — HIGH (ref 0–0.9)
MONOCYTES NFR BLD AUTO: 13.8 % — SIGNIFICANT CHANGE UP (ref 2–14)
NEUTROPHILS # BLD AUTO: 4.68 K/UL — SIGNIFICANT CHANGE UP (ref 1.8–7.4)
NEUTROPHILS NFR BLD AUTO: 59.4 % — SIGNIFICANT CHANGE UP (ref 43–77)
NRBC # BLD: 0 /100 WBCS — SIGNIFICANT CHANGE UP (ref 0–0)
PCO2 BLDV: 50 MMHG — SIGNIFICANT CHANGE UP (ref 35–50)
PH BLDV: 7.37 — SIGNIFICANT CHANGE UP (ref 7.35–7.45)
PLATELET # BLD AUTO: 264 K/UL — SIGNIFICANT CHANGE UP (ref 150–400)
PO2 BLDV: <44 MMHG — SIGNIFICANT CHANGE UP (ref 25–45)
POTASSIUM SERPL-MCNC: 4.2 MMOL/L — SIGNIFICANT CHANGE UP (ref 3.5–5.3)
POTASSIUM SERPL-SCNC: 4.2 MMOL/L — SIGNIFICANT CHANGE UP (ref 3.5–5.3)
PROT SERPL-MCNC: 6.5 G/DL — SIGNIFICANT CHANGE UP (ref 6–8.3)
RBC # BLD: 4.4 M/UL — SIGNIFICANT CHANGE UP (ref 4.2–5.8)
RBC # FLD: 12.7 % — SIGNIFICANT CHANGE UP (ref 10.3–14.5)
SAO2 % BLDV: 67 % — SIGNIFICANT CHANGE UP (ref 67–88)
SODIUM SERPL-SCNC: 142 MMOL/L — SIGNIFICANT CHANGE UP (ref 135–145)
WBC # BLD: 7.88 K/UL — SIGNIFICANT CHANGE UP (ref 3.8–10.5)
WBC # FLD AUTO: 7.88 K/UL — SIGNIFICANT CHANGE UP (ref 3.8–10.5)

## 2019-11-08 PROCEDURE — 74177 CT ABD & PELVIS W/CONTRAST: CPT | Mod: 26

## 2019-11-08 PROCEDURE — 71260 CT THORAX DX C+: CPT | Mod: 26

## 2019-11-08 PROCEDURE — 99233 SBSQ HOSP IP/OBS HIGH 50: CPT

## 2019-11-08 RX ORDER — ACETAMINOPHEN 500 MG
650 TABLET ORAL EVERY 6 HOURS
Refills: 0 | Status: DISCONTINUED | OUTPATIENT
Start: 2019-11-08 | End: 2019-11-09

## 2019-11-08 RX ORDER — METOPROLOL TARTRATE 50 MG
25 TABLET ORAL ONCE
Refills: 0 | Status: COMPLETED | OUTPATIENT
Start: 2019-11-08 | End: 2019-11-08

## 2019-11-08 RX ORDER — METOPROLOL TARTRATE 50 MG
50 TABLET ORAL DAILY
Refills: 0 | Status: DISCONTINUED | OUTPATIENT
Start: 2019-11-09 | End: 2019-11-09

## 2019-11-08 RX ORDER — IOHEXOL 300 MG/ML
500 INJECTION, SOLUTION INTRAVENOUS
Refills: 0 | Status: COMPLETED | OUTPATIENT
Start: 2019-11-08 | End: 2019-11-08

## 2019-11-08 RX ADMIN — SODIUM CHLORIDE 30 MILLILITER(S): 9 INJECTION INTRAMUSCULAR; INTRAVENOUS; SUBCUTANEOUS at 06:42

## 2019-11-08 RX ADMIN — IOHEXOL 500 MILLILITER(S): 300 INJECTION, SOLUTION INTRAVENOUS at 14:14

## 2019-11-08 RX ADMIN — Medication 25 MILLIGRAM(S): at 06:13

## 2019-11-08 RX ADMIN — SODIUM CHLORIDE 75 MILLILITER(S): 9 INJECTION INTRAMUSCULAR; INTRAVENOUS; SUBCUTANEOUS at 04:51

## 2019-11-08 RX ADMIN — PANTOPRAZOLE SODIUM 40 MILLIGRAM(S): 20 TABLET, DELAYED RELEASE ORAL at 06:13

## 2019-11-08 RX ADMIN — Medication 30 MILLILITER(S): at 23:39

## 2019-11-08 RX ADMIN — Medication 650 MILLIGRAM(S): at 17:37

## 2019-11-08 RX ADMIN — Medication 650 MILLIGRAM(S): at 00:52

## 2019-11-08 RX ADMIN — ATORVASTATIN CALCIUM 20 MILLIGRAM(S): 80 TABLET, FILM COATED ORAL at 21:51

## 2019-11-08 RX ADMIN — Medication 650 MILLIGRAM(S): at 00:11

## 2019-11-08 RX ADMIN — ENOXAPARIN SODIUM 40 MILLIGRAM(S): 100 INJECTION SUBCUTANEOUS at 13:01

## 2019-11-08 RX ADMIN — Medication 650 MILLIGRAM(S): at 08:42

## 2019-11-08 RX ADMIN — IOHEXOL 500 MILLILITER(S): 300 INJECTION, SOLUTION INTRAVENOUS at 14:44

## 2019-11-08 RX ADMIN — Medication 25 MILLIGRAM(S): at 17:37

## 2019-11-08 NOTE — CHART NOTE - NSCHARTNOTEFT_GEN_A_CORE
spoke with patient's daughter  will proceed with Thoracentesis and Bronchoscopy on Monday if we can hold Plavix    dx work up

## 2019-11-08 NOTE — CONSULT NOTE ADULT - SUBJECTIVE AND OBJECTIVE BOX
Date/Time Patient Seen:  		  Referring MD:   Data Reviewed	       Patient is a 89y old  Male who presents with a chief complaint of lung mass (07 Nov 2019 21:42)      Subjective/HPI  in bed  seen and examined  vs and meds reviewed  labs reviewed  ct chest reviewed with patient  pt is awake and alert and verbal    H and P reviewed  ER provider note reviewed    on room air     ex smoker        lives with family -     retired      PAST SURGICAL HISTORY:  History of cataract surgery bilateral    Pacemaker [employer] Model # [id number] Serial #[id number]  implant date 05/20/2011    S/P cholecystectomy.     Social History:  Social History (marital status, living situation, occupation, tobacco use, alcohol and drug use, and sexual history): Lives at home with wife  	Ambulates independently  	Performs ADLs independently  	Former smoker, quit 50yrs ago, smoked 1ppd  	Denies EtOH and rec drug use  Reportedly received flu vaccine this season and PNA vaccine in the past     Tobacco Screening:  · Core Measure Site	Yes  · Has the patient used tobacco in the past 30 days?	No    89yo M, w/ PMH/o HTN, HLD, carotid artery disease, heart block s/p PPM, h/o TIAs (last episode ~5yrs ago), h/o prostate cancer, h/o diverticulitis, gastritis, presenting to the ED at the instruction of PCP Dr. Amado recommending chest CT after outpatient CXR today with concerning results. Pt was in his normal state of health until ~1mo ago when he noticed generalized weakness and feeling unwell. He expresses "getting ready in the AM is a chore." He presented to his PCP who appreciated crackles per pt report and pt was sent for CXR that was negative. At that time, pt was prescribed abx for possible occult PNA. Pt's symptoms were not relieved with abx therapy and was unable to follow up with chest CT for insurance reasons. Now pt c/o fevers of 3-day duration with Tmax at home 100.5. Fevers are responsive to Tylenol but recur. In addition to fevers, pt also c/o frequent gas-like pressure in his chest relieved with belching. Pt presented to his PCP again today for further evaluation of fevers and lack of improvement of described symptoms and completed CXR prior to ED visit. No family h/o lung cancer. Pt previously worked in [employer] industry. Endorses chills and chronic constipation, but otherwise denies unexplainable weight loss, new headaches, vision changes, chest pain, palpitations, cough, SOB, abdominal pain, N/V/D, urinary symptoms, new myalgia/arthralgia.   PAST MEDICAL & SURGICAL HISTORY:  Pacemaker: 2007  Diverticulitis  TIA (transient ischemic attack): last TIA over 5 years ago as per patient  Prostate cancer  Carpal tunnel syndrome  HTN (hypertension)  Hyperlipidemia  GERD (gastroesophageal reflux disease)  Heart block  History of cataract surgery: bilateral  S/P cholecystectomy  Pacemaker: [employer] Model # [id number] Serial #[id number]  implant date 05/20/2011        Medication list         MEDICATIONS  (STANDING):  atorvastatin 20 milliGRAM(s) Oral at bedtime  clopidogrel Tablet 75 milliGRAM(s) Oral daily  enoxaparin Injectable 40 milliGRAM(s) SubCutaneous daily  metoprolol succinate ER 25 milliGRAM(s) Oral daily  pantoprazole    Tablet 40 milliGRAM(s) Oral before breakfast  sodium chloride 0.9%. 1000 milliLiter(s) (75 mL/Hr) IV Continuous <Continuous>    MEDICATIONS  (PRN):         Vitals log        ICU Vital Signs Last 24 Hrs  T(C): 36.9 (08 Nov 2019 01:17), Max: 37.7 (08 Nov 2019 00:44)  T(F): 98.5 (08 Nov 2019 01:17), Max: 99.8 (08 Nov 2019 00:44)  HR: 104 (08 Nov 2019 01:17) (92 - 104)  BP: 165/81 (08 Nov 2019 01:17) (139/83 - 186/94)  BP(mean): --  ABP: --  ABP(mean): --  RR: 18 (08 Nov 2019 01:17) (16 - 18)  SpO2: 93% (08 Nov 2019 01:17) (92% - 97%)           Input and Output:  I&O's Detail      Lab Data                        13.7   8.07  )-----------( 321      ( 07 Nov 2019 19:02 )             42.5     11-07    138  |  104  |  19  ----------------------------<  144<H>  3.9   |  26  |  1.20    Ca    9.7      07 Nov 2019 19:02    TPro  7.4  /  Alb  3.1<L>  /  TBili  0.5  /  DBili  x   /  AST  27  /  ALT  20  /  AlkPhos  65  11-07      CARDIAC MARKERS ( 07 Nov 2019 19:02 )  <.015 ng/mL / x     / 66 U/L / x     / 1.4 ng/mL        Review of Systems	  anxious      Objective     Physical Examination    heart s1s2  lung dec BS  abd soft  cn grossly int  on room air  verbal  alert  head nc      Pertinent Lab findings & Imaging      Dubois:  NO   Adequate UO     I&O's Detail           Discussed with:     Cultures:	        Radiology          EXAM:  CT CHEST                            PROCEDURE DATE:  11/07/2019          INTERPRETATION:  CLINICAL INFORMATION: Fever and chest congestion    COMPARISON: None.    PROCEDURE:   CT of the Chest was performed without intravenous contrast.  Sagittal and coronal reformats were performed.      FINDINGS:    LUNGS AND AIRWAYS AND PLEURA: Patent central airways.  Right apical   nodular mass, likely a pleural-based mass, measuring at least up to 10 cm   in craniocaudal dimension, 8 cm in transverse dimension, and 4.3 cm in AP   dimension which extends along the pleural surface, mediastinal surface,   and up to the right hilum. Nodularity also seen along the surface of the   diaphragm. Bibasilar dependent atelectasis and scattered bands of   platelike atelectasis. Small right pleural effusion.    MEDIASTINUM AND SABA: No significant mediastinal lymphadenopathy.    VESSELS: Normal caliber thoracic aorta and main pulmonary artery.   Atherosclerotic ossifications of the thoracic aorta, great vessels, and   coronary arteries.    HEART: Heart size is normal. No pericardial effusion. Cardiac pacemaker   leads in right atrium and right ventricle.    CHEST WALL AND LOWER NECK: Left chest wall pacemaker.    VISUALIZED UPPER ABDOMEN: Cholecystectomy clips. Incompletely visualized   left renal cyst.    BONES: Degenerative changes of the spine.    IMPRESSION:     Right apical nodular mass presumably a pleural-based mass/malignancy   measuring up to 10 cm (CC) x 8 cm (TX) x 4.3 cm (AP). Nodular mass   extends along the pleural surface, mediastinal surface, and up to the   right hilum. Nodularity also seen along the surface of the right   hemidiaphragm. Small right pleural effusion. Considerations include   mesothelioma, lymphoma, direct extension from bronchogenic carcinoma   which is difficult to appreciate on this unenhanced exam, or   pleural-based metastases.                    JOSIAH RHODES D.O. ATTENDING RADIOLOGIST  This document has been electronically signed. Nov 7 2019  7:58PM

## 2019-11-08 NOTE — PATIENT PROFILE ADULT - STATED REASON FOR ADMISSION
Pt stated that he was not feeling well for couple days. He was with fever Pt stated that he was not feeling well for couple days. He was having fever and had difficulty breathing. He went to see his doctor and  they did ct scan and there was nothing found in his lung.  However, pt came today because he continues to feel ill and after pt had a ct scan in the ED pt stated they found a mass. Pt want to see a pulmonologist while he is here in the hospital.

## 2019-11-08 NOTE — CONSULT NOTE ADULT - PROBLEM SELECTOR RECOMMENDATION 9
lung mass and pleural eff -   will benefit from thoracentesis and Bronchoscopy -   will attempt for Both on MONDAY - however - needs to be off Plavix -   will discuss with Patient and PMD Hospitalist  supportive measures  on room air  ct chest reviewed and labs reviewed  will follow

## 2019-11-08 NOTE — ED ADULT NURSE REASSESSMENT NOTE - NS ED NURSE REASSESS COMMENT FT1
Report given to ERLINDA Rowe, will send pt to room 236.
pt admitted, awaiting to endorse report to floor nurse, pt updated on plan of care.
pt received at change of shift, pt returned from CT scan at 1940, lactic acid 2.6, MD aware, will start IVF.
CT scan resulted, MD at bedside, tests results discussed by MD, pt admitted, awaiting admission orders. pt aware of plan of care.

## 2019-11-09 LAB
ANION GAP SERPL CALC-SCNC: 3 MMOL/L — LOW (ref 5–17)
BACTERIA UR CULT: NORMAL
BUN SERPL-MCNC: 13 MG/DL — SIGNIFICANT CHANGE UP (ref 7–23)
CALCIUM SERPL-MCNC: 9 MG/DL — SIGNIFICANT CHANGE UP (ref 8.5–10.1)
CHLORIDE SERPL-SCNC: 104 MMOL/L — SIGNIFICANT CHANGE UP (ref 96–108)
CO2 SERPL-SCNC: 31 MMOL/L — SIGNIFICANT CHANGE UP (ref 22–31)
CREAT SERPL-MCNC: 0.9 MG/DL — SIGNIFICANT CHANGE UP (ref 0.5–1.3)
GLUCOSE SERPL-MCNC: 107 MG/DL — HIGH (ref 70–99)
HCT VFR BLD CALC: 40.8 % — SIGNIFICANT CHANGE UP (ref 39–50)
HGB BLD-MCNC: 13 G/DL — SIGNIFICANT CHANGE UP (ref 13–17)
MAGNESIUM SERPL-MCNC: 2.1 MG/DL — SIGNIFICANT CHANGE UP (ref 1.6–2.6)
MCHC RBC-ENTMCNC: 27.8 PG — SIGNIFICANT CHANGE UP (ref 27–34)
MCHC RBC-ENTMCNC: 31.9 GM/DL — LOW (ref 32–36)
MCV RBC AUTO: 87.2 FL — SIGNIFICANT CHANGE UP (ref 80–100)
NRBC # BLD: 0 /100 WBCS — SIGNIFICANT CHANGE UP (ref 0–0)
PLATELET # BLD AUTO: 301 K/UL — SIGNIFICANT CHANGE UP (ref 150–400)
POTASSIUM SERPL-MCNC: 4.6 MMOL/L — SIGNIFICANT CHANGE UP (ref 3.5–5.3)
POTASSIUM SERPL-SCNC: 4.6 MMOL/L — SIGNIFICANT CHANGE UP (ref 3.5–5.3)
RBC # BLD: 4.68 M/UL — SIGNIFICANT CHANGE UP (ref 4.2–5.8)
RBC # FLD: 12.8 % — SIGNIFICANT CHANGE UP (ref 10.3–14.5)
SODIUM SERPL-SCNC: 138 MMOL/L — SIGNIFICANT CHANGE UP (ref 135–145)
WBC # BLD: 9.62 K/UL — SIGNIFICANT CHANGE UP (ref 3.8–10.5)
WBC # FLD AUTO: 9.62 K/UL — SIGNIFICANT CHANGE UP (ref 3.8–10.5)

## 2019-11-09 PROCEDURE — 99232 SBSQ HOSP IP/OBS MODERATE 35: CPT

## 2019-11-09 RX ORDER — METOPROLOL TARTRATE 50 MG
50 TABLET ORAL ONCE
Refills: 0 | Status: COMPLETED | OUTPATIENT
Start: 2019-11-09 | End: 2019-11-09

## 2019-11-09 RX ORDER — METOPROLOL TARTRATE 50 MG
100 TABLET ORAL DAILY
Refills: 0 | Status: DISCONTINUED | OUTPATIENT
Start: 2019-11-10 | End: 2019-11-12

## 2019-11-09 RX ORDER — ACETAMINOPHEN 500 MG
1000 TABLET ORAL EVERY 6 HOURS
Refills: 0 | Status: DISCONTINUED | OUTPATIENT
Start: 2019-11-09 | End: 2019-11-12

## 2019-11-09 RX ORDER — ACETAMINOPHEN 500 MG
1000 TABLET ORAL ONCE
Refills: 0 | Status: COMPLETED | OUTPATIENT
Start: 2019-11-09 | End: 2019-11-09

## 2019-11-09 RX ORDER — IPRATROPIUM/ALBUTEROL SULFATE 18-103MCG
3 AEROSOL WITH ADAPTER (GRAM) INHALATION ONCE
Refills: 0 | Status: COMPLETED | OUTPATIENT
Start: 2019-11-09 | End: 2019-11-09

## 2019-11-09 RX ADMIN — Medication 50 MILLIGRAM(S): at 16:21

## 2019-11-09 RX ADMIN — ATORVASTATIN CALCIUM 20 MILLIGRAM(S): 80 TABLET, FILM COATED ORAL at 21:30

## 2019-11-09 RX ADMIN — ENOXAPARIN SODIUM 40 MILLIGRAM(S): 100 INJECTION SUBCUTANEOUS at 12:08

## 2019-11-09 RX ADMIN — Medication 50 MILLIGRAM(S): at 05:22

## 2019-11-09 RX ADMIN — Medication 1000 MILLIGRAM(S): at 13:53

## 2019-11-09 RX ADMIN — Medication 1000 MILLIGRAM(S): at 22:30

## 2019-11-09 RX ADMIN — Medication 1000 MILLIGRAM(S): at 03:40

## 2019-11-09 RX ADMIN — Medication 30 MILLILITER(S): at 16:25

## 2019-11-09 RX ADMIN — Medication 3 MILLILITER(S): at 23:51

## 2019-11-09 RX ADMIN — Medication 1000 MILLIGRAM(S): at 02:40

## 2019-11-09 RX ADMIN — Medication 1000 MILLIGRAM(S): at 14:50

## 2019-11-09 RX ADMIN — Medication 1000 MILLIGRAM(S): at 21:30

## 2019-11-09 RX ADMIN — PANTOPRAZOLE SODIUM 40 MILLIGRAM(S): 20 TABLET, DELAYED RELEASE ORAL at 05:22

## 2019-11-09 RX ADMIN — Medication 30 MILLILITER(S): at 09:16

## 2019-11-09 NOTE — PROGRESS NOTE ADULT - SUBJECTIVE AND OBJECTIVE BOX
Date/Time Patient Seen:  		  Referring MD:   Data Reviewed	       Patient is a 89y old  Male who presents with a chief complaint of lung mass (08 Nov 2019 11:53)      Subjective/HPI     PAST MEDICAL & SURGICAL HISTORY:  Pacemaker: 2007  Diverticulitis  TIA (transient ischemic attack): last TIA over 5 years ago as per patient  Prostate cancer  Carpal tunnel syndrome  HTN (hypertension)  Hyperlipidemia  GERD (gastroesophageal reflux disease)  Heart block  History of cataract surgery: bilateral  S/P cholecystectomy  Pacemaker: [employer] Model # [id number] Serial #[id number]  implant date 05/20/2011        Medication list         MEDICATIONS  (STANDING):  atorvastatin 20 milliGRAM(s) Oral at bedtime  enoxaparin Injectable 40 milliGRAM(s) SubCutaneous daily  metoprolol succinate ER 50 milliGRAM(s) Oral daily  pantoprazole    Tablet 40 milliGRAM(s) Oral before breakfast  sodium chloride 0.9%. 1000 milliLiter(s) (30 mL/Hr) IV Continuous <Continuous>    MEDICATIONS  (PRN):  acetaminophen   Tablet .. 650 milliGRAM(s) Oral every 6 hours PRN Temp greater or equal to 38C (100.4F), Mild Pain (1 - 3)         Vitals log        ICU Vital Signs Last 24 Hrs  T(C): 37.1 (08 Nov 2019 23:20), Max: 37.3 (08 Nov 2019 07:38)  T(F): 98.8 (08 Nov 2019 23:20), Max: 99.2 (08 Nov 2019 07:38)  HR: 94 (09 Nov 2019 05:16) (91 - 104)  BP: 129/73 (09 Nov 2019 05:16) (129/73 - 180/102)  BP(mean): --  ABP: --  ABP(mean): --  RR: 18 (08 Nov 2019 23:20) (18 - 18)  SpO2: 93% (08 Nov 2019 23:20) (91% - 96%)           Input and Output:  I&O's Detail      Lab Data                        12.1   7.88  )-----------( 264      ( 08 Nov 2019 06:42 )             38.4     11-08    142  |  109<H>  |  16  ----------------------------<  102<H>  4.2   |  28  |  0.94    Ca    8.8      08 Nov 2019 06:42    TPro  6.5  /  Alb  2.6<L>  /  TBili  0.5  /  DBili  x   /  AST  26  /  ALT  17  /  AlkPhos  57  11-08      CARDIAC MARKERS ( 07 Nov 2019 19:02 )  <.015 ng/mL / x     / 66 U/L / x     / 1.4 ng/mL        Review of Systems	      Objective     Physical Examination    heart s1s2  lung dec BS  abd soft      Pertinent Lab findings & Imaging      Dubois:  NO   Adequate UO     I&O's Detail           Discussed with:     Cultures:	        Radiology

## 2019-11-09 NOTE — PROGRESS NOTE ADULT - PROBLEM SELECTOR PLAN 1
lung mass  pleural eff  planned for Monday thoracentesis and Bronchoscopy   Plavix on HOLD  discussed with pt and dtr  labs and markers and imaging reviewed  GOC documented

## 2019-11-09 NOTE — PROGRESS NOTE ADULT - SUBJECTIVE AND OBJECTIVE BOX
CHIEF COMPLAINT/INTERVAL HISTORY:  Pt. seen and evaluated for lung mass.  Pt. is in no distress.  Awaiting thoracentesis and bronchoscopy with biopsy on Monday.      REVIEW OF SYSTEMS:  No fever, CP, SOB, or abdominal pain.     Vital Signs Last 24 Hrs  T(C): 36.8 (2019 07:34), Max: 37.1 (2019 23:20)  T(F): 98.3 (2019 07:34), Max: 98.8 (2019 23:20)  HR: 92 (2019 07:34) (91 - 104)  BP: 163/75 (2019 07:34) (129/73 - 180/102)  BP(mean): --  RR: 16 (2019 07:34) (16 - 18)  SpO2: 91% (2019 07:34) (91% - 96%)    PHYSICAL EXAM:  GENERAL: NAD  HEENT: EOMI, hearing normal, conjunctiva and sclera clear  Chest: Diminished BS at right base, no wheezing  CV: S1S2, RRR,   GI: soft, +BS, NT/ND  Musculoskeletal: trace LE edema  Psychiatric: affect nL, mood nL  Skin: warm and dry    LABS:                        13.0   9.62  )-----------( 301      ( 2019 06:48 )             40.8         138  |  104  |  13  ----------------------------<  107<H>  4.6   |  31  |  0.90    Ca    9.0      2019 06:48  Mg     2.1         TPro  6.5  /  Alb  2.6<L>  /  TBili  0.5  /  DBili  x   /  AST  26  /  ALT  17  /  AlkPhos  57        Urinalysis Basic - ( 2019 21:16 )    Color: Pale Yellow / Appearance: Clear / S.005 / pH: x  Gluc: x / Ketone: Negative  / Bili: Negative / Urobili: Negative   Blood: x / Protein: Negative / Nitrite: Negative   Leuk Esterase: Negative / RBC: 0-2 /HPF / WBC 0-2   Sq Epi: x / Non Sq Epi: Occasional / Bacteria: Occasional        Assessment and Plan:  -Lung mass:  CT C/A/P with 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  No evidence for metastatic disease in the abdomen and pelvis.  Pt. scheduled for thoracentesis and bronchoscopy with biopsy on .  Holding Plavix for planned procedure.  Pulmonary f/u  -Elevated lactate:  Trending down.  Pt. is hemodynamically stable and does not appear toxic.    -HTN:  continue  Toprol XL 50mg PO daily  -HLD:  continue Lipitor 20mg PO QHS  -TIA:  continue Lipitor.  Plavix held for bronchoscopy and biopsy.   -VTE ppx:  Lovenox 40mg SQ daily

## 2019-11-10 ENCOUNTER — TRANSCRIPTION ENCOUNTER (OUTPATIENT)
Age: 84
End: 2019-11-10

## 2019-11-10 LAB
ANION GAP SERPL CALC-SCNC: 4 MMOL/L — LOW (ref 5–17)
APTT BLD: 32.5 SEC — SIGNIFICANT CHANGE UP (ref 28.5–37)
BUN SERPL-MCNC: 17 MG/DL — SIGNIFICANT CHANGE UP (ref 7–23)
CALCIUM SERPL-MCNC: 9.6 MG/DL — SIGNIFICANT CHANGE UP (ref 8.5–10.1)
CHLORIDE SERPL-SCNC: 104 MMOL/L — SIGNIFICANT CHANGE UP (ref 96–108)
CO2 SERPL-SCNC: 29 MMOL/L — SIGNIFICANT CHANGE UP (ref 22–31)
CREAT SERPL-MCNC: 0.75 MG/DL — SIGNIFICANT CHANGE UP (ref 0.5–1.3)
GLUCOSE SERPL-MCNC: 98 MG/DL — SIGNIFICANT CHANGE UP (ref 70–99)
HCT VFR BLD CALC: 39.7 % — SIGNIFICANT CHANGE UP (ref 39–50)
HGB BLD-MCNC: 12.9 G/DL — LOW (ref 13–17)
INR BLD: 1.27 RATIO — HIGH (ref 0.88–1.16)
LDH SERPL L TO P-CCNC: 567 U/L — HIGH (ref 50–242)
MAGNESIUM SERPL-MCNC: 2.2 MG/DL — SIGNIFICANT CHANGE UP (ref 1.6–2.6)
MCHC RBC-ENTMCNC: 28 PG — SIGNIFICANT CHANGE UP (ref 27–34)
MCHC RBC-ENTMCNC: 32.5 GM/DL — SIGNIFICANT CHANGE UP (ref 32–36)
MCV RBC AUTO: 86.1 FL — SIGNIFICANT CHANGE UP (ref 80–100)
NRBC # BLD: 0 /100 WBCS — SIGNIFICANT CHANGE UP (ref 0–0)
PLATELET # BLD AUTO: 299 K/UL — SIGNIFICANT CHANGE UP (ref 150–400)
POTASSIUM SERPL-MCNC: 4.2 MMOL/L — SIGNIFICANT CHANGE UP (ref 3.5–5.3)
POTASSIUM SERPL-SCNC: 4.2 MMOL/L — SIGNIFICANT CHANGE UP (ref 3.5–5.3)
PROTHROM AB SERPL-ACNC: 14.5 SEC — HIGH (ref 10–12.9)
RBC # BLD: 4.61 M/UL — SIGNIFICANT CHANGE UP (ref 4.2–5.8)
RBC # FLD: 12.7 % — SIGNIFICANT CHANGE UP (ref 10.3–14.5)
SODIUM SERPL-SCNC: 137 MMOL/L — SIGNIFICANT CHANGE UP (ref 135–145)
WBC # BLD: 8.86 K/UL — SIGNIFICANT CHANGE UP (ref 3.8–10.5)
WBC # FLD AUTO: 8.86 K/UL — SIGNIFICANT CHANGE UP (ref 3.8–10.5)

## 2019-11-10 PROCEDURE — 99232 SBSQ HOSP IP/OBS MODERATE 35: CPT

## 2019-11-10 RX ORDER — IPRATROPIUM/ALBUTEROL SULFATE 18-103MCG
3 AEROSOL WITH ADAPTER (GRAM) INHALATION ONCE
Refills: 0 | Status: COMPLETED | OUTPATIENT
Start: 2019-11-10 | End: 2019-11-10

## 2019-11-10 RX ORDER — SIMETHICONE 80 MG/1
80 TABLET, CHEWABLE ORAL ONCE
Refills: 0 | Status: COMPLETED | OUTPATIENT
Start: 2019-11-10 | End: 2019-11-10

## 2019-11-10 RX ORDER — LANOLIN ALCOHOL/MO/W.PET/CERES
3 CREAM (GRAM) TOPICAL AT BEDTIME
Refills: 0 | Status: COMPLETED | OUTPATIENT
Start: 2019-11-10 | End: 2019-11-10

## 2019-11-10 RX ADMIN — Medication 1000 MILLIGRAM(S): at 12:17

## 2019-11-10 RX ADMIN — SIMETHICONE 80 MILLIGRAM(S): 80 TABLET, CHEWABLE ORAL at 22:19

## 2019-11-10 RX ADMIN — PANTOPRAZOLE SODIUM 40 MILLIGRAM(S): 20 TABLET, DELAYED RELEASE ORAL at 05:10

## 2019-11-10 RX ADMIN — Medication 100 MILLIGRAM(S): at 05:12

## 2019-11-10 RX ADMIN — Medication 1000 MILLIGRAM(S): at 05:13

## 2019-11-10 RX ADMIN — Medication 1000 MILLIGRAM(S): at 18:00

## 2019-11-10 RX ADMIN — ATORVASTATIN CALCIUM 20 MILLIGRAM(S): 80 TABLET, FILM COATED ORAL at 22:08

## 2019-11-10 RX ADMIN — Medication 1000 MILLIGRAM(S): at 16:58

## 2019-11-10 RX ADMIN — Medication 3 MILLILITER(S): at 23:12

## 2019-11-10 RX ADMIN — Medication 30 MILLILITER(S): at 19:06

## 2019-11-10 RX ADMIN — Medication 1000 MILLIGRAM(S): at 10:59

## 2019-11-10 RX ADMIN — Medication 1000 MILLIGRAM(S): at 04:13

## 2019-11-10 NOTE — DISCHARGE NOTE PROVIDER - NSDCCPCAREPLAN_GEN_ALL_CORE_FT
PRINCIPAL DISCHARGE DIAGNOSIS  Diagnosis: Lung mass  Assessment and Plan of Treatment: you had a bronchoscopy with biopsy of mass.  Pulmonary Dr. Swartz will contact you with results of pathology results.      SECONDARY DISCHARGE DIAGNOSES  Diagnosis: TIA (transient ischemic attack)  Assessment and Plan of Treatment: resume your Plavix and cholesterol lowering medication.    Diagnosis: HTN (hypertension)  Assessment and Plan of Treatment: Your Toprol XL dose was increased for better control of your blood pressure.  Please continue taking prescribed dose of Toprol XL.

## 2019-11-10 NOTE — DISCHARGE NOTE PROVIDER - CARE PROVIDERS DIRECT ADDRESSES
[email or web address],DirectAddress_Unknown

## 2019-11-10 NOTE — DISCHARGE NOTE PROVIDER - HOSPITAL COURSE
89yo M, w/ PMH/o HTN, HLD, carotid artery disease, heart block s/p PPM, h/o TIAs (last episode ~5yrs ago), h/o prostate cancer, h/o diverticulitis, gastritis, presenting to the ED at the instruction of PCP Dr. Amado recommending chest CT after outpatient CXR today with concerning results. Pt was in his normal state of health until ~1mo ago when he noticed generalized weakness and feeling unwell. He expresses "getting ready in the AM is a chore." He presented to his PCP who appreciated crackles per pt report and pt was sent for CXR that was negative. At that time, pt was prescribed abx for possible occult PNA. Pt's symptoms were not relieved with abx therapy and was unable to follow up with chest CT for insurance reasons. Now pt c/o fevers of 3-day duration with Tmax at home 100.5. Fevers are responsive to Tylenol but recur. In addition to fevers, pt also c/o frequent gas-like pressure in his chest relieved with belching. Pt presented to his PCP again today for further evaluation of fevers and lack of improvement of described symptoms and completed CXR prior to ED visit. No family h/o lung cancer. Pt previously worked in [employer] industry. Endorses chills and chronic constipation, but otherwise denies unexplainable weight loss, new headaches, vision changes, chest pain, palpitations, cough, SOB, abdominal pain, N/V/D, urinary symptoms, new myalgia/arthralgia.        In the ED    VS T 97.9 /94 HR 92 RR 16 SpO2 95%    Labs WNL.    CT chest: Right apical nodular mass presumably a pleural-based mass/malignancy     measuring up to 10 cm (CC) x 8 cm (TX) x 4.3 cm (AP). Nodular mass     extends along the pleural surface, mediastinal surface, and up to the     right hilum. Nodularity also seen along the surface of the right     hemidiaphragm. Small right pleural effusion. Considerations include mesothelioma, lymphoma, direct extension from bronchogenic carcinoma     which is difficult to appreciate on this unenhanced exam, or pleural-based metastases.    EKG: sinus rhythm with premature supraventricular complexes, LAD, nonspecific intraventricular block, age-undetermined possible lateral and anterior infarct        Received 2.7L NS, 1x DuoNeb.         Pt. was admitted with pulmonary consultation. His Plavix was discontinued for planned bronchoscopy with biopsy.  Blood cultures showed no growth.  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   No evidence for metastatic disease in the abdomen and pelvis. 89yo M, w/ PMH/o HTN, HLD, carotid artery disease, heart block s/p PPM, h/o TIAs (last episode ~5yrs ago), h/o prostate cancer, h/o diverticulitis, gastritis, presenting to the ED at the instruction of PCP Dr. Amado recommending chest CT after outpatient CXR today with concerning results. Pt was in his normal state of health until ~1mo ago when he noticed generalized weakness and feeling unwell. He expresses "getting ready in the AM is a chore." He presented to his PCP who appreciated crackles per pt report and pt was sent for CXR that was negative. At that time, pt was prescribed abx for possible occult PNA. Pt's symptoms were not relieved with abx therapy and was unable to follow up with chest CT for insurance reasons. Now pt c/o fevers of 3-day duration with Tmax at home 100.5. Fevers are responsive to Tylenol but recur. In addition to fevers, pt also c/o frequent gas-like pressure in his chest relieved with belching. Pt presented to his PCP again today for further evaluation of fevers and lack of improvement of described symptoms and completed CXR prior to ED visit. No family h/o lung cancer. Pt previously worked in [employer] industry. Endorses chills and chronic constipation, but otherwise denies unexplainable weight loss, new headaches, vision changes, chest pain, palpitations, cough, SOB, abdominal pain, N/V/D, urinary symptoms, new myalgia/arthralgia.        In the ED    VS T 97.9 /94 HR 92 RR 16 SpO2 95%    Labs WNL.    CT chest: Right apical nodular mass presumably a pleural-based mass/malignancy     measuring up to 10 cm (CC) x 8 cm (TX) x 4.3 cm (AP). Nodular mass     extends along the pleural surface, mediastinal surface, and up to the     right hilum. Nodularity also seen along the surface of the right     hemidiaphragm. Small right pleural effusion. Considerations include mesothelioma, lymphoma, direct extension from bronchogenic carcinoma     which is difficult to appreciate on this unenhanced exam, or pleural-based metastases.    EKG: sinus rhythm with premature supraventricular complexes, LAD, nonspecific intraventricular block, age-undetermined possible lateral and anterior infarct        Received 2.7L NS, 1x DuoNeb.         Pt. was admitted with pulmonary consultation. His Plavix was discontinued for planned bronchoscopy with biopsy.  Blood cultures showed no growth.  CT chest/Abdomen/Pelvis with contrast showed Right upper lobe mass with extensive pleural metastasis extending to the lung apex and causing mass effect on the SVC.   No evidence for metastatic disease in the abdomen and pelvis.  Pt. had thoracentesis with removal of 1885cc of serous fluid. 89yo M, w/ PMH/o HTN, HLD, carotid artery disease, heart block s/p PPM, h/o TIAs (last episode ~5yrs ago), h/o prostate cancer, h/o diverticulitis, gastritis, presenting to the ED at the instruction of PCP Dr. Amado recommending chest CT after outpatient CXR today with concerning results. Pt was in his normal state of health until ~1mo ago when he noticed generalized weakness and feeling unwell. He expresses "getting ready in the AM is a chore." He presented to his PCP who appreciated crackles per pt report and pt was sent for CXR that was negative. At that time, pt was prescribed abx for possible occult PNA. Pt's symptoms were not relieved with abx therapy and was unable to follow up with chest CT for insurance reasons. Now pt c/o fevers of 3-day duration with Tmax at home 100.5. Fevers are responsive to Tylenol but recur. In addition to fevers, pt also c/o frequent gas-like pressure in his chest relieved with belching. Pt presented to his PCP again today for further evaluation of fevers and lack of improvement of described symptoms and completed CXR prior to ED visit. No family h/o lung cancer. Pt previously worked in [employer] industry. Endorses chills and chronic constipation, but otherwise denies unexplainable weight loss, new headaches, vision changes, chest pain, palpitations, cough, SOB, abdominal pain, N/V/D, urinary symptoms, new myalgia/arthralgia.        In the ED    VS T 97.9 /94 HR 92 RR 16 SpO2 95%    Labs WNL.    CT chest: Right apical nodular mass presumably a pleural-based mass/malignancy     measuring up to 10 cm (CC) x 8 cm (TX) x 4.3 cm (AP). Nodular mass     extends along the pleural surface, mediastinal surface, and up to the     right hilum. Nodularity also seen along the surface of the right     hemidiaphragm. Small right pleural effusion. Considerations include mesothelioma, lymphoma, direct extension from bronchogenic carcinoma     which is difficult to appreciate on this unenhanced exam, or pleural-based metastases.    EKG: sinus rhythm with premature supraventricular complexes, LAD, nonspecific intraventricular block, age-undetermined possible lateral and anterior infarct        Received 2.7L NS, 1x DuoNeb.         Pt. was admitted with pulmonary consultation. His Plavix was discontinued for planned bronchoscopy with biopsy.  Blood cultures showed no growth.  CT chest/Abdomen/Pelvis with contrast showed Right upper lobe mass with extensive pleural metastasis extending to the lung apex and causing mass effect on the SVC.   No evidence for metastatic disease in the abdomen and pelvis.  Pt. had thoracentesis with removal of 1885cc of serous fluid.  Pleural fluid was exudative.  Pt. had bronchoscopy with biopsy and tolerated procedure well.  During the hospitalization his blood pressure was elevated and his Toprol XL dose was increased with better control of his blood pressure.          On day of discharge patient is in no distress.  Afebrile and hemodynamically stable.          Completion of discharge in 35 minutes.

## 2019-11-10 NOTE — DISCHARGE NOTE PROVIDER - CARE PROVIDER_API CALL
Angel Amado)  Internal Medicine  [address] [place] [address]  Phone: [phone]  Fax: [phone]  Follow Up Time:     Kameron Swartz)  Critical Care Medicine; HospicePalliative Medicine; Internal Medicine; Pulmonary Disease  [address] [place] [address]  Phone: [phone]  Fax: [phone]  Follow Up Time:

## 2019-11-10 NOTE — PROGRESS NOTE ADULT - SUBJECTIVE AND OBJECTIVE BOX
CHIEF COMPLAINT/INTERVAL HISTORY:  Pt. seen and evaluated for lung mass and pleural effusion.  Pt. is in no distress.  Not in any acute respiratory distress.      REVIEW OF SYSTEMS:  No fever, CP, acute respiratory distress, or abdominal pain.     Vital Signs Last 24 Hrs  T(C): 36.7 (10 Nov 2019 07:32), Max: 37.4 (09 Nov 2019 13:59)  T(F): 98.1 (10 Nov 2019 07:32), Max: 99.4 (09 Nov 2019 15:52)  HR: 86 (10 Nov 2019 07:32) (84 - 109)  BP: 145/84 (10 Nov 2019 07:32) (118/77 - 179/94)  BP(mean): --  RR: 18 (10 Nov 2019 07:32) (16 - 18)  SpO2: 96% (10 Nov 2019 07:32) (88% - 96%)    PHYSICAL EXAM:  GENERAL: NAD  HEENT: EOMI, hearing normal, conjunctiva and sclera clear  Chest: Diminished at right base, no wheezing  CV: S1S2, RRR,   GI: soft, +BS, NT/ND  Musculoskeletal: trace LE edema  Psychiatric: affect nL, mood nL  Skin: warm and dry    LABS:                        12.9   8.86  )-----------( 299      ( 10 Nov 2019 06:59 )             39.7     11-10    137  |  104  |  17  ----------------------------<  98  4.2   |  29  |  0.75    Ca    9.6      10 Nov 2019 06:59  Mg     2.2     11-10      PT/INR - ( 10 Nov 2019 06:59 )   PT: 14.5 sec;   INR: 1.27 ratio         PTT - ( 10 Nov 2019 06:59 )  PTT:32.5 sec      Assessment and Plan:  -Lung mass:  CT C/A/P with 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  No evidence for metastatic disease in the abdomen and pelvis.  Pt. scheduled for thoracentesis and bronchoscopy with biopsy tomorrow.  Holding Plavix for planned procedure.  Pulmonary f/u  -Elevated lactate:  Trending down.  Pt. is hemodynamically stable and does not appear toxic.    -HTN:  continue  Toprol XL 100mg PO daily  -HLD:  continue Lipitor 20mg PO QHS  -TIA:  continue Lipitor.  Plavix held for bronchoscopy and biopsy.   -VTE ppx:  SCD

## 2019-11-10 NOTE — PROGRESS NOTE ADULT - PROBLEM SELECTOR PLAN 1
lung mass and effusion  thoracentesis tomorrow at 11 30 am  bronchoscopy tomorrow at 2 pm  will hold Lovenox  NPO after midnight  work up in progress  discussed with patient and wife

## 2019-11-10 NOTE — DISCHARGE NOTE PROVIDER - NSDCFUADDINST_GEN_ALL_CORE_FT
Please follow up with your PMD in 1 week.  Dr. Swartz will contact you with pathology results when they are available.

## 2019-11-10 NOTE — DISCHARGE NOTE PROVIDER - NSDCMRMEDTOKEN_GEN_ALL_CORE_FT
clopidogrel 75 mg oral tablet: 1 tab(s) orally once a day (at bedtime)  metoprolol succinate 25 mg oral tablet, extended release: 1 tab(s) orally once a day  pantoprazole 40 mg oral delayed release tablet: 1 tab(s) orally once a day  rosuvastatin 5 mg oral tablet: 1 tab(s) orally once a day acetaminophen 500 mg oral tablet: 2 tab(s) orally every 6 hours, As needed for pain  clopidogrel 75 mg oral tablet: 1 tab(s) orally once a day (at bedtime)  metoprolol succinate 100 mg oral tablet, extended release: 1 tab(s) orally once a day  pantoprazole 40 mg oral delayed release tablet: 1 tab(s) orally once a day  rosuvastatin 5 mg oral tablet: 1 tab(s) orally once a day acetaminophen 500 mg oral tablet: 2 tab(s) orally every 6 hours, As needed for pain  clopidogrel 75 mg oral tablet: 1 tab(s) orally once a day (at bedtime)  metoprolol succinate 100 mg oral tablet, extended release: 1 tab(s) orally once a day  pantoprazole 40 mg oral delayed release tablet: 1 tab(s) orally once a day  Rolling Walker: Use as directed  ICD 10: R26.81 Unsteadiness on feet  EBONI:[id number]  rosuvastatin 5 mg oral tablet: 1 tab(s) orally once a day

## 2019-11-10 NOTE — PROGRESS NOTE ADULT - SUBJECTIVE AND OBJECTIVE BOX
Date/Time Patient Seen:  		  Referring MD:   Data Reviewed	       Patient is a 89y old  Male who presents with a chief complaint of lung mass (09 Nov 2019 10:40)      Subjective/HPI     PAST MEDICAL & SURGICAL HISTORY:  Pacemaker: 2007  Diverticulitis  TIA (transient ischemic attack): last TIA over 5 years ago as per patient  Prostate cancer  Carpal tunnel syndrome  HTN (hypertension)  Hyperlipidemia  GERD (gastroesophageal reflux disease)  Heart block  History of cataract surgery: bilateral  S/P cholecystectomy  Pacemaker: [employer] Model # [id number] Serial #[id number]  implant date 05/20/2011        Medication list         MEDICATIONS  (STANDING):  atorvastatin 20 milliGRAM(s) Oral at bedtime  metoprolol succinate  milliGRAM(s) Oral daily  pantoprazole    Tablet 40 milliGRAM(s) Oral before breakfast  sodium chloride 0.9%. 1000 milliLiter(s) (30 mL/Hr) IV Continuous <Continuous>    MEDICATIONS  (PRN):  acetaminophen   Tablet .. 1000 milliGRAM(s) Oral every 6 hours PRN Mild Pain (1 - 3)  aluminum hydroxide/magnesium hydroxide/simethicone Suspension 30 milliLiter(s) Oral every 6 hours PRN Dyspepsia         Vitals log        ICU Vital Signs Last 24 Hrs  T(C): 37.1 (09 Nov 2019 23:20), Max: 37.4 (09 Nov 2019 13:59)  T(F): 98.8 (09 Nov 2019 23:20), Max: 99.4 (09 Nov 2019 15:52)  HR: 92 (10 Nov 2019 05:09) (84 - 109)  BP: 118/77 (10 Nov 2019 05:09) (118/77 - 179/94)  BP(mean): --  ABP: --  ABP(mean): --  RR: 18 (09 Nov 2019 23:25) (16 - 18)  SpO2: 93% (09 Nov 2019 23:53) (88% - 95%)           Input and Output:  I&O's Detail    09 Nov 2019 07:01  -  10 Nov 2019 07:00  --------------------------------------------------------  IN:  Total IN: 0 mL    OUT:    Voided: 50 mL  Total OUT: 50 mL    Total NET: -50 mL          Lab Data                        12.9   8.86  )-----------( 299      ( 10 Nov 2019 06:59 )             39.7     11-09    138  |  104  |  13  ----------------------------<  107<H>  4.6   |  31  |  0.90    Ca    9.0      09 Nov 2019 06:48  Mg     2.1     11-09              Review of Systems	      Objective     Physical Examination    heart s1s2  lung dec BS  abd soft      Pertinent Lab findings & Imaging      Sherly:  NO   Adequate UO     I&O's Detail    09 Nov 2019 07:01  -  10 Nov 2019 07:00  --------------------------------------------------------  IN:  Total IN: 0 mL    OUT:    Voided: 50 mL  Total OUT: 50 mL    Total NET: -50 mL               Discussed with:     Cultures:	        Radiology

## 2019-11-11 ENCOUNTER — RESULT REVIEW (OUTPATIENT)
Age: 84
End: 2019-11-11

## 2019-11-11 LAB
ALBUMIN FLD-MCNC: 2.2 G/DL — SIGNIFICANT CHANGE UP
ANION GAP SERPL CALC-SCNC: 5 MMOL/L — SIGNIFICANT CHANGE UP (ref 5–17)
B PERT IGG+IGM PNL SER: ABNORMAL
BUN SERPL-MCNC: 19 MG/DL — SIGNIFICANT CHANGE UP (ref 7–23)
CALCIUM SERPL-MCNC: 9.4 MG/DL — SIGNIFICANT CHANGE UP (ref 8.5–10.1)
CHLORIDE SERPL-SCNC: 104 MMOL/L — SIGNIFICANT CHANGE UP (ref 96–108)
CO2 SERPL-SCNC: 29 MMOL/L — SIGNIFICANT CHANGE UP (ref 22–31)
COLOR FLD: SIGNIFICANT CHANGE UP
CREAT SERPL-MCNC: 0.85 MG/DL — SIGNIFICANT CHANGE UP (ref 0.5–1.3)
FLUID INTAKE SUBSTANCE CLASS: SIGNIFICANT CHANGE UP
FLUID SEGMENTED GRANULOCYTES: 9 % — SIGNIFICANT CHANGE UP
GLUCOSE FLD-MCNC: 89 MG/DL — SIGNIFICANT CHANGE UP
GLUCOSE SERPL-MCNC: 106 MG/DL — HIGH (ref 70–99)
GRAM STN FLD: SIGNIFICANT CHANGE UP
GRAM STN FLD: SIGNIFICANT CHANGE UP
HCT VFR BLD CALC: 40.4 % — SIGNIFICANT CHANGE UP (ref 39–50)
HGB BLD-MCNC: 13 G/DL — SIGNIFICANT CHANGE UP (ref 13–17)
LDH SERPL L TO P-CCNC: 1751 U/L — SIGNIFICANT CHANGE UP
LYMPHOCYTES # FLD: 30 % — SIGNIFICANT CHANGE UP
MCHC RBC-ENTMCNC: 27.9 PG — SIGNIFICANT CHANGE UP (ref 27–34)
MCHC RBC-ENTMCNC: 32.2 GM/DL — SIGNIFICANT CHANGE UP (ref 32–36)
MCV RBC AUTO: 86.7 FL — SIGNIFICANT CHANGE UP (ref 80–100)
MESOTHL CELL # FLD: 8 % — SIGNIFICANT CHANGE UP
MONOS+MACROS # FLD: 53 % — SIGNIFICANT CHANGE UP
NRBC # BLD: 0 /100 WBCS — SIGNIFICANT CHANGE UP (ref 0–0)
PH FLD: 7.37 — SIGNIFICANT CHANGE UP
PLATELET # BLD AUTO: 325 K/UL — SIGNIFICANT CHANGE UP (ref 150–400)
POTASSIUM SERPL-MCNC: 4.4 MMOL/L — SIGNIFICANT CHANGE UP (ref 3.5–5.3)
POTASSIUM SERPL-SCNC: 4.4 MMOL/L — SIGNIFICANT CHANGE UP (ref 3.5–5.3)
PROT FLD-MCNC: 4.1 G/DL — SIGNIFICANT CHANGE UP
RBC # BLD: 4.66 M/UL — SIGNIFICANT CHANGE UP (ref 4.2–5.8)
RBC # FLD: 12.7 % — SIGNIFICANT CHANGE UP (ref 10.3–14.5)
RCV VOL RI: HIGH /UL (ref 0–0)
SODIUM SERPL-SCNC: 138 MMOL/L — SIGNIFICANT CHANGE UP (ref 135–145)
SPECIMEN SOURCE: SIGNIFICANT CHANGE UP
SPECIMEN SOURCE: SIGNIFICANT CHANGE UP
TOTAL NUCLEATED CELL COUNT, BODY FLUID: 1647 /UL — SIGNIFICANT CHANGE UP
TUBE TYPE: SIGNIFICANT CHANGE UP
WBC # BLD: 9.32 K/UL — SIGNIFICANT CHANGE UP (ref 3.8–10.5)
WBC # FLD AUTO: 9.32 K/UL — SIGNIFICANT CHANGE UP (ref 3.8–10.5)

## 2019-11-11 PROCEDURE — 88108 CYTOPATH CONCENTRATE TECH: CPT | Mod: 26

## 2019-11-11 PROCEDURE — 71045 X-RAY EXAM CHEST 1 VIEW: CPT | Mod: 26

## 2019-11-11 PROCEDURE — 99222 1ST HOSP IP/OBS MODERATE 55: CPT

## 2019-11-11 PROCEDURE — 32555 ASPIRATE PLEURA W/ IMAGING: CPT | Mod: RT

## 2019-11-11 PROCEDURE — 88305 TISSUE EXAM BY PATHOLOGIST: CPT | Mod: 26

## 2019-11-11 PROCEDURE — 99232 SBSQ HOSP IP/OBS MODERATE 35: CPT

## 2019-11-11 RX ORDER — ACETAMINOPHEN 500 MG
1000 TABLET ORAL ONCE
Refills: 0 | Status: COMPLETED | OUTPATIENT
Start: 2019-11-11 | End: 2019-11-11

## 2019-11-11 RX ORDER — LANOLIN ALCOHOL/MO/W.PET/CERES
3 CREAM (GRAM) TOPICAL AT BEDTIME
Refills: 0 | Status: DISCONTINUED | OUTPATIENT
Start: 2019-11-11 | End: 2019-11-12

## 2019-11-11 RX ADMIN — Medication 1000 MILLIGRAM(S): at 18:38

## 2019-11-11 RX ADMIN — ATORVASTATIN CALCIUM 20 MILLIGRAM(S): 80 TABLET, FILM COATED ORAL at 22:05

## 2019-11-11 RX ADMIN — Medication 1000 MILLIGRAM(S): at 12:38

## 2019-11-11 RX ADMIN — SODIUM CHLORIDE 30 MILLILITER(S): 9 INJECTION INTRAMUSCULAR; INTRAVENOUS; SUBCUTANEOUS at 00:00

## 2019-11-11 RX ADMIN — Medication 3 MILLIGRAM(S): at 00:00

## 2019-11-11 RX ADMIN — Medication 1000 MILLIGRAM(S): at 05:21

## 2019-11-11 RX ADMIN — Medication 400 MILLIGRAM(S): at 12:08

## 2019-11-11 RX ADMIN — Medication 1000 MILLIGRAM(S): at 17:59

## 2019-11-11 RX ADMIN — PANTOPRAZOLE SODIUM 40 MILLIGRAM(S): 20 TABLET, DELAYED RELEASE ORAL at 05:21

## 2019-11-11 RX ADMIN — Medication 100 MILLIGRAM(S): at 05:21

## 2019-11-11 RX ADMIN — Medication 3 MILLIGRAM(S): at 22:20

## 2019-11-11 RX ADMIN — Medication 1000 MILLIGRAM(S): at 06:21

## 2019-11-11 NOTE — PROGRESS NOTE ADULT - PROBLEM SELECTOR PLAN 1
lung mass  effusion  thoracentesis this am  bronchoscopy this afternoon  NPO  off Lovenox and ASA and Plavix  on o2 support  discussed with pt and dtr  will follow  labs and imaging reviewed

## 2019-11-11 NOTE — PROCEDURE NOTE - NSPROCDETAILS_GEN_ALL_CORE
location identified, draped/prepped, sterile technique used, needle inserted/introduced/ultrasound assessment of effusion (size)

## 2019-11-11 NOTE — CONSULT NOTE ADULT - CONSULT REASON
lung mass compressing the SVC, in the setting of pacemaker and carotid a disease lung mass compressing the SVC, in the setting of pacemaker and carotid artery disease new found lung mass compressing the SVC, in the setting of pacemaker and carotid artery disease

## 2019-11-11 NOTE — PROGRESS NOTE ADULT - SUBJECTIVE AND OBJECTIVE BOX
Date/Time Patient Seen:  		  Referring MD:   Data Reviewed	       Patient is a 89y old  Male who presents with a chief complaint of lung mass (10 Nov 2019 12:15)      Subjective/HPI     PAST MEDICAL & SURGICAL HISTORY:  Pacemaker: 2007  Diverticulitis  TIA (transient ischemic attack): last TIA over 5 years ago as per patient  Prostate cancer  Carpal tunnel syndrome  HTN (hypertension)  Hyperlipidemia  GERD (gastroesophageal reflux disease)  Heart block  History of cataract surgery: bilateral  S/P cholecystectomy  Pacemaker: [employer] Model # [id number] Serial #[id number]  implant date 05/20/2011        Medication list         MEDICATIONS  (STANDING):  atorvastatin 20 milliGRAM(s) Oral at bedtime  metoprolol succinate  milliGRAM(s) Oral daily  pantoprazole    Tablet 40 milliGRAM(s) Oral before breakfast  sodium chloride 0.9%. 1000 milliLiter(s) (30 mL/Hr) IV Continuous <Continuous>    MEDICATIONS  (PRN):  acetaminophen   Tablet .. 1000 milliGRAM(s) Oral every 6 hours PRN Mild Pain (1 - 3)  aluminum hydroxide/magnesium hydroxide/simethicone Suspension 30 milliLiter(s) Oral every 6 hours PRN Dyspepsia         Vitals log        ICU Vital Signs Last 24 Hrs  T(C): 37.1 (11 Nov 2019 00:13), Max: 37.1 (11 Nov 2019 00:13)  T(F): 98.7 (11 Nov 2019 00:13), Max: 98.7 (11 Nov 2019 00:13)  HR: 109 (11 Nov 2019 05:19) (86 - 109)  BP: 144/90 (11 Nov 2019 05:19) (131/79 - 147/77)  BP(mean): --  ABP: --  ABP(mean): --  RR: 18 (11 Nov 2019 00:13) (18 - 18)  SpO2: 91% (11 Nov 2019 00:13) (91% - 96%)           Input and Output:  I&O's Detail    09 Nov 2019 07:01  -  10 Nov 2019 07:00  --------------------------------------------------------  IN:  Total IN: 0 mL    OUT:    Voided: 50 mL  Total OUT: 50 mL    Total NET: -50 mL          Lab Data                        12.9   8.86  )-----------( 299      ( 10 Nov 2019 06:59 )             39.7     11-10    137  |  104  |  17  ----------------------------<  98  4.2   |  29  |  0.75    Ca    9.6      10 Nov 2019 06:59  Mg     2.2     11-10              Review of Systems	      Objective     Physical Examination    heart s1s2  lung dc BS  abd soft      Pertinent Lab findings & Imaging      Sherly:  NO   Adequate UO     I&O's Detail    09 Nov 2019 07:01  -  10 Nov 2019 07:00  --------------------------------------------------------  IN:  Total IN: 0 mL    OUT:    Voided: 50 mL  Total OUT: 50 mL    Total NET: -50 mL               Discussed with:     Cultures:	        Radiology

## 2019-11-11 NOTE — CONSULT NOTE ADULT - ASSESSMENT
89yo M, w/ PMH/o heart block s/p PPM, carotid artery disease, h/o TIAs (last episode ~5yrs ago), HTN, HLD, h/o prostate cancer, h/o diverticulitis, gastritis, presenting to the ED at the instruction of PCP Dr. Amado recommending chest CT after outpatient CXR with concerning results.  Pt. found to have upper lobe lung mass with extensive pleural mets, right pleural effusions and mass effect on SVC.  Cardiology consulted for cardiac recommendations for mass effect on SVC.    New found right lung mass with mass effect on SVC in the setting of pacemaker h/o heart block, CAD, multiple TIAs  CT chest: Right upper lobe mass 10cm X 8cm X 4cm in the pleural surface/mediastinal surface, up to right hilum, pleural mets, mass effect on SVC, common right pleural effusion  EKG:  Ventricular paced; Ventricular rate 92    Recommendations:  monitor for signs and symptoms for signs of SVC syndrome including head, neck and face edema, swelling of upper arm and torso, skin cyanosis  monitor for hemodynamic changes  continue home dose plavix _________ after thoracocentesis and bronchoscopy  continue home dose metoprolol, rosuvastatin   consider Lasix if symptoms develop 89yo M, w/ PMH/o heart block s/p PPM ([employer], last interrogated 9/17/2019, implanted 5/2011 serial # [id number] model [id number]), carotid artery disease, h/o TIAs (last episode ~5yrs ago), HTN, HLD, h/o prostate cancer, h/o diverticulitis, gastritis, presenting to the ED at the instruction of PCP Dr. Amado recommending chest CT after outpatient CXR with concerning results.  Pt. found to have upper lobe lung mass with extensive pleural mets, right pleural effusions and mass effect on SVC.  Cardiology consulted for cardiac recommendations for mass effect on SVC.    -New found right lung mass with mass effect on SVC in the setting of pacemaker h/o heart block, CAD, multiple TIAs  -CT chest: Right upper lobe mass 10cm X 8cm X 4cm in the pleural surface/mediastinal surface, up to right hilum, pleural mets, mass effect on SVC, common right pleural effusion  -EKG:  Ventricular paced; Ventricular rate 92    Recommendations:  holding plavix for thoracocentesis and bronchoscopy with biopsy today   continue home dose plavix _________ after thoracocentesis and bronchoscopy  monitor for signs and symptoms for signs of SVC syndrome including head, neck and face edema, swelling of upper arm and torso, skin cyanosis  monitor for hemodynamic changes  continue home dose metoprolol, rosuvastatin   consider Lasix if symptoms develop 89yo M, w/ PMH/o heart block s/p PPM ([employer], last interrogated 9/17/2019, implanted 5/2011serial # [id number] model [id number]), carotid artery disease, h/o TIAs (last episode ~5yrs ago), HTN, HLD, h/o prostate cancer, h/o diverticulitis, gastritis, presenting to the ED at the instruction of PCP Dr. Amado recommending chest CT after outpatient CXR with concerning results.  Pt. found to have upper lobe lung mass with extensive pleural mets, right pleural effusions and mass effect on SVC.  Cardiology consulted for cardiac recommendations for mass effect on SVC.    -New found right lung mass with mass effect on SVC in the setting of pacemaker h/o heart block, CAD, multiple TIAs  -CT chest: Right upper lobe mass 10cm X 8cm X 4cm in the pleural surface/mediastinal surface, up to right hilum, pleural mets, mass effect on SVC, common right pleural effusion  -EKG:  Ventricular paced; Ventricular rate 92    Recommendations:  holding plavix for thoracocentesis and bronchoscopy with biopsy today   continue home dose plavix _________ after thoracocentesis and bronchoscopy  monitor for signs and symptoms for signs of SVC syndrome including head, neck and face edema, swelling of upper arm and torso, skin cyanosis  monitor for hemodynamic changes  continue home dose metoprolol, rosuvastatin   consider Lasix if symptoms develop 89yo M, w/ PMH/o heart block s/p PPM ([employer], last interrogated 9/17/2019, implanted 5/2011serial # [id number] model [id number]), carotid artery disease, h/o TIAs (last episode ~5yrs ago), HTN, HLD, h/o prostate cancer, h/o diverticulitis, gastritis, presenting to the ED at the instruction of PCP Dr. Amado recommending chest CT after outpatient CXR with concerning results.  Pt. found to have upper lobe lung mass with extensive pleural mets, right pleural effusions and mass effect on SVC.  Cardiology consulted for cardiac recommendations for mass effect on SVC.    -New found right lung mass with mass effect on SVC in the setting of pacemaker h/o heart block, CAD, multiple TIAs  -CT chest: Right upper lobe mass 10cm X 8cm X 4cm in the pleural surface/mediastinal surface, up to right hilum, pleural mets, mass effect on SVC, common right pleural effusion  -EKG:  Ventricular paced; Ventricular rate 92  -thoracocentesis 1885 milliLiters removed today from right pleural effusion    Recommendations:  holding plavix for thoracocentesis and bronchoscopy with biopsy today  continue home dose plavix _______hrs__ after thoracocentesis and bronchoscopy  monitor for signs and symptoms for signs of SVC syndrome including head, neck and face edema, swelling of upper arm and torso, skin cyanosis  monitor for hemodynamic changes  continue home dose metoprolol, rosuvastatin   consider Lasix if symptoms develop 89yo M, w/ PMH/o heart block s/p PPM ([employer], last interrogated 9/17/2019, implanted 5/2011serial # [id number] model [id number]), carotid artery disease, h/o TIAs (last episode ~5yrs ago), HTN, HLD, h/o prostate cancer, h/o diverticulitis, gastritis, presenting to the ED at the instruction of PCP Dr. Amado recommending chest CT after outpatient CXR with concerning results.  Pt. found to have upper lobe lung mass with extensive pleural mets, right pleural effusions and mass effect on SVC.  Cardiology consulted for cardiac recommendations for mass effect on SVC.    -New found right lung mass with mass effect on SVC in the setting of pacemaker h/o heart block, CAD, multiple TIAs  -CT chest: Right upper lobe mass 10cm X 8cm X 4cm in the pleural surface/mediastinal surface, up to right hilum, pleural mets, mass effect on SVC, common right pleural effusion  -EKG:  Ventricular paced; Ventricular rate 92  -thoracocentesis 1885 milliLiters removed today from right pleural effusion    Recommendations:  holding plavix for thoracocentesis and bronchoscopy with biopsy today  pain medications as per primary care team, given pt is having pleuritic CP 2/2 thoracocentesis today   continue home dose plavix _______hrs__ after thoracocentesis and bronchoscopy  monitor for signs and symptoms for signs of SVC syndrome including head, neck and face edema, swelling of upper arm and torso, skin cyanosis  monitor for hemodynamic changes  continue home dose metoprolol, rosuvastatin   consider Lasix if symptoms develop 89yo M, w/ PMH/o heart block s/p PPM ([employer], last interrogated 9/17/2019, implanted 5/2011serial # [id number] model [id number]), carotid artery disease, h/o TIAs (last episode ~5yrs ago), HTN, HLD, h/o prostate cancer, h/o diverticulitis, gastritis, presenting to the ED at the instruction of PCP Dr. Amado recommending chest CT after outpatient CXR with concerning results.  Pt. found to have upper lobe lung mass with extensive pleural mets, right pleural effusions and mass effect on SVC.  Cardiology consulted for cardiac recommendations for mass effect on SVC.    -New found right lung mass with mass effect on SVC in the setting of pacemaker h/o heart block, CAD, multiple TIAs  -CT chest: Right upper lobe mass 10cm X 8cm X 4cm in the pleural surface/mediastinal surface, up to right hilum, pleural mets, mass effect on SVC, right pleural effusion  -EKG: sinus rhythm with premature supraventricular complexes, LAD, nonspecific intraventricular block  -s/p thoracocentesis 1885 milliLiters removed today from right pleural effusion    Recommendations:  pacemaker last interrogated in 9/2019, good battery life (remaining capacity TAO 15%)  atrial noise, follow up with outpt cardiology, not affecting pacemaker function    no active cardiac conditions, proceed with bronchoscopy which is a low-medium risk procedure  holding plavix for thoracocentesis and bronchoscopy with biopsy today  pain medications as per primary care team, given pt is having pleuritic CP 2/2 thoracocentesis today   resume plavix when appropriate  monitor for signs and symptoms for signs of SVC syndrome including head, neck and face edema, swelling of upper arm and torso, skin cyanosis  monitor for hemodynamic changes  continue home dose metoprolol, rosuvastatin   consider Lasix if symptoms develop 89yo M, w/ PMH w SSS s/p PPM , carotid artery disease, h/o TIAs, HTN, HLD, pw upper lobe lung mass with extensive pleural mets, right pleural effusions and mass effect on SVC.      -New found right lung mass with mass effect on SVC in the setting of pacemaker h/o heart block, CAD, multiple TIAs  -CT chest: Right upper lobe mass 10cm X 8cm X 4cm in the pleural surface/mediastinal surface, up to right hilum, pleural mets, mass effect on SVC, right pleural effusion  -EKG: sinus rhythm with premature supraventricular complexes, LAD, nonspecific intraventricular block  -s/p thoracocentesis 1885 milliLiters removed today from right pleural effusion    Recommendations:  - pacemaker last interrogated in 9/2019, good battery life (remaining capacity TAO 15%)  - Event noted on ppm c/w atrial noise, follow up with outpt cardiology, not affecting pacemaker function      - no active cardiac conditions. Optimized from CV POV to proceed with this intermediate risk bronchoscopy without any further cardiac testing. Routine hemodynamic monitoring advised.   - holding plavix for thoracocentesis and bronchoscopy with biopsy today  - pain medications as per primary care team, given pt is having pleuritic CP 2/2 thoracocentesis today   - resume plavix when appropriate  - monitor for signs and symptoms for signs of SVC syndrome including head, neck and face edema, swelling of upper arm and torso, skin cyanosis  - continue home dose metoprolol, rosuvastatin   - Further cardiac workup will depend on clinical course.

## 2019-11-11 NOTE — CONSULT NOTE ADULT - ATTENDING COMMENTS
I saw and examined the patient personally. Spoke with above provider regarding this case. I reviewed the above findings completely.  I agree with the above history, physical, and plan which I have edited where appropriate.     Now with new lung mass resulting in SVC compression. Currently no active cardiac conditions. No signs of ischemia, ADHF, clinical exam not consistent with severe stenotic valvular disease, no unstable arrhythmias noted. Therefore able to proceed with this intermediate risk bronch without any further cardiac workup. Routine hemodynamic monitoring is suggested during the procedure. PPM with good battery. Atrial noise can be followed up as outpt.

## 2019-11-11 NOTE — PROCEDURE NOTE - ADDITIONAL PROCEDURE DETAILS
bronchoscopy done under anesthesia - vocal cords passed - mucus - copious amounts suctioned -   no endobronchial lesions - atelectasis and mucus plugs relieved -   recovered in PACU  discussed post op with dtr  specimens - wash from RML RLL RUL sent for path and micro

## 2019-11-11 NOTE — PROGRESS NOTE ADULT - SUBJECTIVE AND OBJECTIVE BOX
CHIEF COMPLAINT/INTERVAL HISTORY:  Pt. seen and evaluated for lung mass and pleural effusion.  Pt. is s/p thoracentesis with removal of 1885cc of serous fluid.  Pt. is in no distress.      REVIEW OF SYSTEMS:  No fever, CP, SOB, or abdominal pain.     Vital Signs Last 24 Hrs  T(C): 37.1 (11 Nov 2019 11:38), Max: 37.1 (11 Nov 2019 00:13)  T(F): 98.7 (11 Nov 2019 11:38), Max: 98.7 (11 Nov 2019 00:13)  HR: 90 (11 Nov 2019 11:38) (90 - 109)  BP: 169/96 (11 Nov 2019 11:38) (128/78 - 169/96)  BP(mean): --  RR: 19 (11 Nov 2019 11:38) (17 - 19)  SpO2: 97% (11 Nov 2019 11:38) (91% - 97%)    PHYSICAL EXAM:  GENERAL: NAD  HEENT: EOMI, hearing normal, conjunctiva and sclera clear  Chest: Diminished at right base, no wheezing  CV: S1S2, RRR,   GI: soft, +BS, NT/ND  Musculoskeletal: trace LE edema  Psychiatric: affect nL, mood nL  Skin: warm and dry    LABS:                        13.0   9.32  )-----------( 325      ( 11 Nov 2019 07:06 )             40.4     11-11    138  |  104  |  19  ----------------------------<  106<H>  4.4   |  29  |  0.85    Ca    9.4      11 Nov 2019 07:06  Mg     2.2     11-10      PT/INR - ( 10 Nov 2019 06:59 )   PT: 14.5 sec;   INR: 1.27 ratio         PTT - ( 10 Nov 2019 06:59 )  PTT:32.5 sec      Assessment and Plan:  -Lung mass:  CT C/A/P with 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  No evidence for metastatic disease in the abdomen and pelvis.  s/p thoracentesis with 1885cc removed.  Check pleural fluid studies and cytology.  Pt. scheduled for bronchoscopy with biopsy later today.  Holding Plavix for planned procedure.  Pulmonary f/u.  Pt. without s/s of ACS or decompensated CHF.  No absolute contraindication from medical perspective to proceed with planned bronchoscopy with biopsy.    -Elevated lactate:  Trending down.  Pt. is hemodynamically stable and does not appear toxic.    -HTN:  continue  Toprol XL 100mg PO daily  -HLD:  continue Lipitor 20mg PO QHS  -TIA:  continue Lipitor.  Plavix held for bronchoscopy and biopsy.   -VTE ppx:  SCD

## 2019-11-11 NOTE — CONSULT NOTE ADULT - SUBJECTIVE AND OBJECTIVE BOX
Patient is a 89y old  Male who presents with a chief complaint of lung mass (11 Nov 2019 06:24)      HPI:  89yo M, w/ PMH/o HTN, HLD, carotid artery disease, heart block s/p PPM, h/o TIAs (last episode ~5yrs ago), h/o prostate cancer, h/o diverticulitis, gastritis, presenting to the ED at the instruction of PCP Dr. Amado recommending chest CT after outpatient CXR today with concerning results. Pt was in his normal state of health until ~1mo ago when he noticed generalized weakness and feeling unwell. He expresses "getting ready in the AM is a chore." He presented to his PCP who appreciated crackles per pt report and pt was sent for CXR that was negative. At that time, pt was prescribed abx for possible occult PNA. Pt's symptoms were not relieved with abx therapy and was unable to follow up with chest CT for insurance reasons. Now pt c/o fevers of 3-day duration with Tmax at home 100.5. Fevers are responsive to Tylenol but recur. In addition to fevers, pt also c/o frequent gas-like pressure in his chest relieved with belching. Pt presented to his PCP again today for further evaluation of fevers and lack of improvement of described symptoms and completed CXR prior to ED visit. No family h/o lung cancer. Pt previously worked in [employer] industry. Endorses chills and chronic constipation, but otherwise denies unexplainable weight loss, new headaches, vision changes, chest pain, palpitations, cough, SOB, abdominal pain, N/V/D, urinary symptoms, new myalgia/arthralgia.    In the ED  VS T 97.9 /94 HR 92 RR 16 SpO2 95%  Labs WNL.  CT chest: Right apical nodular mass presumably a pleural-based mass/malignancy   measuring up to 10 cm (CC) x 8 cm (TX) x 4.3 cm (AP). Nodular mass   extends along the pleural surface, mediastinal surface, and up to the   right hilum. Nodularity also seen along the surface of the right   hemidiaphragm. Small right pleural effusion. Considerations include mesothelioma, lymphoma, direct extension from bronchogenic carcinoma   which is difficult to appreciate on this unenhanced exam, or pleural-based metastases.  EKG: sinus rhythm with premature supraventricular complexes, LAD, nonspecific intraventricular block, age-undetermined possible lateral and anterior infarct    Received 2.7L NS, 1x DuoNeb. (07 Nov 2019 21:42)      PAST MEDICAL & SURGICAL HISTORY:  Pacemaker: 2007  Diverticulitis  TIA (transient ischemic attack): last TIA over 5 years ago as per patient  Prostate cancer  Carpal tunnel syndrome  HTN (hypertension)  Hyperlipidemia  GERD (gastroesophageal reflux disease)  Heart block  History of cataract surgery: bilateral  S/P cholecystectomy  Pacemaker: [employer] Model # [id number] Serial #[id number]  implant date 05/20/2011            ECHO  FINDINGS:      MEDICATIONS  (STANDING):  atorvastatin 20 milliGRAM(s) Oral at bedtime  metoprolol succinate  milliGRAM(s) Oral daily  pantoprazole    Tablet 40 milliGRAM(s) Oral before breakfast  sodium chloride 0.9%. 1000 milliLiter(s) (30 mL/Hr) IV Continuous <Continuous>    MEDICATIONS  (PRN):  acetaminophen   Tablet .. 1000 milliGRAM(s) Oral every 6 hours PRN Mild Pain (1 - 3)  aluminum hydroxide/magnesium hydroxide/simethicone Suspension 30 milliLiter(s) Oral every 6 hours PRN Dyspepsia      FAMILY HISTORY:    Denies Family history of CAD or early MI      Constitutional: denies fever, chills  HEENT: denies blurry vision, difficulty hearing  Respiratory: denies SOB, GALVAN, cough  Cardiovascular: denies CP, palpitations, orthopnea, PND, LE edema  Gastrointestinal: denies nausea, vomiting, abdominal pain  Genitourinary: denies urinary changes  Skin: Denies rashes, itching  Neurologic: denies headache, weakness, dizziness  Hematology/Oncology: denies bleeding, easy bruising  ROS negative except as noted above      SOCIAL HISTORY:    No tobacco, Alcohol or Ddrug use    Vital Signs Last 24 Hrs  T(C): 36.8 (11 Nov 2019 07:25), Max: 37.1 (11 Nov 2019 00:13)  T(F): 98.3 (11 Nov 2019 07:25), Max: 98.7 (11 Nov 2019 00:13)  HR: 93 (11 Nov 2019 07:25) (93 - 109)  BP: 128/78 (11 Nov 2019 07:25) (128/78 - 147/77)  BP(mean): --  RR: 17 (11 Nov 2019 07:25) (17 - 18)  SpO2: 94% (11 Nov 2019 07:25) (91% - 95%)    Physical Exam:  General: Well developed, well nourished, NAD  HEENT: NCAT, PERRLA, EOMI bl, moist mucous membranes   Neck: Supple, nontender, no mass  Neurology: A&Ox3, nonfocal, sensation intact   Respiratory: CTA B/L, No W/R/R  CV: RRR, +S1/S2, no murmurs, rubs or gallops  Abdominal: Soft, NT, ND +BSx4, no palpable masses  Extremities: No C/C/E, + peripheral pulses  MSK: Normal ROM, no joint erythema or warmth, no joint swelling   Heme: No obvious ecchymosis or petechiae   Skin: warm, dry, normal color      ECG:    I&O's Detail      LABS:                        13.0   9.32  )-----------( 325      ( 11 Nov 2019 07:06 )             40.4     11-11    138  |  104  |  19  ----------------------------<  106<H>  4.4   |  29  |  0.85    Ca    9.4      11 Nov 2019 07:06  Mg     2.2     11-10          PT/INR - ( 10 Nov 2019 06:59 )   PT: 14.5 sec;   INR: 1.27 ratio         PTT - ( 10 Nov 2019 06:59 )  PTT:32.5 sec    I&O's Summary    BNP  RADIOLOGY & ADDITIONAL STUDIES: Patient is a 89y old  Male who presents with a chief complaint of lung mass (11 Nov 2019 06:24)      HPI:  89yo M, w/ PMH/o heart block s/p PPM, carotid artery disease, h/o TIAs (last episode ~5yrs ago), HTN, HLD, h/o prostate cancer, h/o diverticulitis, gastritis, presenting to the ED at the instruction of PCP Dr. Amado recommending chest CT after outpatient CXR with concerning results.  Pt was in his normal state of health until ~1mo ago when he noticed generalized weakness and feeling unwell. He expresses "getting ready in the AM is a chore." He presented to his PCP who appreciated crackles per pt report and pt was sent for CXR that was negative. At that time, pt was prescribed abx for possible occult PNA. Pt's symptoms were not relieved with abx therapy and was unable to follow up with chest CT for insurance reasons. Now pt c/o fevers of 3-day duration with Tmax at home 100.5. Fevers are responsive to Tylenol but recur. In addition to fevers, pt also c/o frequent gas-like pressure in his chest relieved with belching. Pt presented to his PCP again today for further evaluation of fevers and lack of improvement of described symptoms and completed CXR prior to ED visit. No family h/o lung cancer. Pt previously worked in [employer] industry. Endorses chills and chronic constipation, but otherwise denies unexplainable weight loss, new headaches, vision changes, chest pain, palpitations, cough, SOB, abdominal pain, N/V/D, urinary symptoms, new myalgia/arthralgia.    In the ED  VS T 97.9 /94 HR 92 RR 16 SpO2 95%  Labs WNL.  CT chest: Right apical nodular mass presumably a pleural-based mass/malignancy   measuring up to 10 cm (CC) x 8 cm (TX) x 4.3 cm (AP). Nodular mass   extends along the pleural surface, mediastinal surface, and up to the   right hilum. Nodularity also seen along the surface of the right   hemidiaphragm. Small right pleural effusion. Considerations include mesothelioma, lymphoma, direct extension from bronchogenic carcinoma   which is difficult to appreciate on this unenhanced exam, or pleural-based metastases.  EKG: sinus rhythm with premature supraventricular complexes, LAD, nonspecific intraventricular block, age-undetermined possible lateral and anterior infarct    Received 2.7L NS, 1x DuoNeb. (07 Nov 2019 21:42)    INTERVAL HISTORY:      PAST MEDICAL & SURGICAL HISTORY:  Pacemaker: 2007  Diverticulitis  TIA (transient ischemic attack): last TIA over 5 years ago as per patient  Prostate cancer  Carpal tunnel syndrome  HTN (hypertension)  Hyperlipidemia  GERD (gastroesophageal reflux disease)  Heart block  History of cataract surgery: bilateral  S/P cholecystectomy  Pacemaker: [employer] Model # [id number] Serial #[id number]  implant date 05/20/2011            ECHO  FINDINGS:      MEDICATIONS  (STANDING):  atorvastatin 20 milliGRAM(s) Oral at bedtime  metoprolol succinate  milliGRAM(s) Oral daily  pantoprazole    Tablet 40 milliGRAM(s) Oral before breakfast  sodium chloride 0.9%. 1000 milliLiter(s) (30 mL/Hr) IV Continuous <Continuous>    MEDICATIONS  (PRN):  acetaminophen   Tablet .. 1000 milliGRAM(s) Oral every 6 hours PRN Mild Pain (1 - 3)  aluminum hydroxide/magnesium hydroxide/simethicone Suspension 30 milliLiter(s) Oral every 6 hours PRN Dyspepsia      FAMILY HISTORY:    Denies Family history of CAD or early MI      Constitutional: denies fever, chills  HEENT: denies blurry vision, difficulty hearing  Respiratory: denies SOB, GALVAN, cough  Cardiovascular: denies CP, palpitations, orthopnea, PND, LE edema  Gastrointestinal: denies nausea, vomiting, abdominal pain  Genitourinary: denies urinary changes  Skin: Denies rashes, itching  Neurologic: denies headache, weakness, dizziness  Hematology/Oncology: denies bleeding, easy bruising  ROS negative except as noted above      SOCIAL HISTORY:    No tobacco, Alcohol or drug use    Vital Signs Last 24 Hrs  T(C): 36.8 (11 Nov 2019 07:25), Max: 37.1 (11 Nov 2019 00:13)  T(F): 98.3 (11 Nov 2019 07:25), Max: 98.7 (11 Nov 2019 00:13)  HR: 93 (11 Nov 2019 07:25) (93 - 109)  BP: 128/78 (11 Nov 2019 07:25) (128/78 - 147/77)  BP(mean): --  RR: 17 (11 Nov 2019 07:25) (17 - 18)  SpO2: 94% (11 Nov 2019 07:25) (91% - 95%)    Physical Exam:  General: Well developed, well nourished, NAD  HEENT: NCAT, PERRLA, EOMI bl, moist mucous membranes   Neck: Supple, nontender, no mass  Neurology: A&Ox3, nonfocal, sensation intact   Respiratory: CTA B/L, No W/R/R  CV: RRR, +S1/S2, no murmurs, rubs or gallops  Abdominal: Soft, NT, ND +BSx4, no palpable masses  Extremities: No C/C/E, + peripheral pulses  MSK: Normal ROM, no joint erythema or warmth, no joint swelling   Heme: No obvious ecchymosis or petechiae   Skin: warm, dry, normal color      ECG:  Electronic ventricular pacemaker      Ventricular Rate 92 BPM    Atrial Rate 92 BPM    P-R Interval 182 ms    QRS Duration 154 ms    Q-T Interval 428 ms    QTC Calculation(Bezet) 529 ms    P Axis 42 degrees    R Axis -73 degrees    T Axis 77 degrees    Diagnosis Line Electronic ventricular pacemaker  Confirmed by reid Ojeda ([id number]) on 11/8/2019 3:36:50 PM      I&O's Detail      LABS:                        13.0   9.32  )-----------( 325      ( 11 Nov 2019 07:06 )             40.4     11-11    138  |  104  |  19  ----------------------------<  106<H>  4.4   |  29  |  0.85    Ca    9.4      11 Nov 2019 07:06  Mg     2.2     11-10          PT/INR - ( 10 Nov 2019 06:59 )   PT: 14.5 sec;   INR: 1.27 ratio         PTT - ( 10 Nov 2019 06:59 )  PTT:32.5 sec    I&O's Summary    BNP    RADIOLOGY & ADDITIONAL STUDIES:  EXAM:  CT CHEST OC IC                          EXAM:  CT ABDOMEN AND PELVIS OC IC                            PROCEDURE DATE:  11/08/2019          INTERPRETATION:  CLINICAL INFORMATION: Right lung mass, for staging.    COMPARISON: Chest CT 11/7/2019, CT abdomen pelvis 6/7/2019.    PROCEDURE:   CT of the Chest, Abdomen and Pelvis was performed with intravenous   contrast.   Intravenous contrast: 90 ml Omnipaque 350. 10 ml discarded.  Oral contrast: Positive contrast was administered.  Sagittal and coronal reformats were performed.    FINDINGS:    CHEST:     LUNGS AND LARGE AIRWAYS: Patent central airways. There is a 4.5 x 5.6 x   7.3 cm right upper lobe mass which exerts mass effect on the SVC and is   inseparable from the mediastinum and pleura extending inferiorly to the   right hilum and superiorly to the right lung apex. There is diffuse   pleural-based mass extending superiorly to the right lung apex. Multiple   pleural-based nodules are also present more inferiorly inseparable from   the mediastinum. There is mild interstitial prominence of the aerated   portion of the right lung. There is compressive atelectasis of the right   lower lobe related to the presence of pleural effusion. Interstitial   prominence peripherally of the left lung.  PLEURA: Moderate right pleural effusion. Diffuse right pleural   involvement as above.  VESSELS: The thoracic aorta and main pulmonary artery are normal in   caliber. The superior vena cava appears somewhat narrowed by compression   from the right lung mass. No central pulmonary embolism.  HEART: Heart size is normal. Pacemaker leads in the right atrium and   right ventricle. No pericardial effusion.  MEDIASTINUM AND SABA: No mediastinal or right hilar lymphadenopathy.   Right upper lobe mass contiguous with right hilum as described above.   There is a 1.4 x 1.2 cm right pericardial lymph node on image #61/series   2.  CHEST WALL AND LOWER NECK: The thyroid gland is within normal limits.   There are no supraclavicular or axillary lymphadenopathy.    ABDOMEN AND PELVIS:    LIVER: Subcentimeter hypodensity in the left hepatic lobe on image   #70/series 2.  2. Small to further characterize.  BILE DUCTS: Normal caliber.  GALLBLADDER: Status postcholecystectomy.  SPLEEN: Within normal limits.  PANCREAS: Within normal limits.  ADRENALS: Within normal limits.  KIDNEYS/URETERS: No renal stones or hydronephrosis. There is a large left   parapelvic cyst with exophytic component measuring 7 x 6 cm.    BLADDER: Within normal limits.  REPRODUCTIVE ORGANS: Prostate within normal limits. There is a penile   prosthesis with reservoir anterosuperior to the urinary bladder.    BOWEL: No bowel obstruction. Appendix is not definitively identified.   There is colonic diverticulosis without diverticulitis.  PERITONEUM: No ascites.  VESSELS: The aorta and IVC are normal in caliber and patent. There is   atherosclerosis of the aorta.  RETROPERITONEUM/LYMPH NODES: No lymphadenopathy.    ABDOMINAL WALL: Within normal limits. Pacemaker generator pack in the   left anterior chest wall with leads through a 6 left subclavian approach.  BONES: Within normal limits.    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 Details are as above.    No evidence for metastatic disease in the abdomen and pelvis.                    ADELA SRINIVASAN M.D. ATTENDING RADIOLOGIST  This document has been electronically signed. Nov 8 2019  4:49PM            EXAM:  CT CHEST OC IC                          EXAM:  CT ABDOMEN AND PELVIS OC IC                            PROCEDURE DATE:  11/08/2019          INTERPRETATION:  CLINICAL INFORMATION: Right lung mass, for staging.    COMPARISON: Chest CT 11/7/2019, CT abdomen pelvis 6/7/2019.    PROCEDURE:   CT of the Chest, Abdomen and Pelvis was performed with intravenous   contrast.   Intravenous contrast: 90 ml Omnipaque 350. 10 ml discarded.  Oral contrast: Positive contrast was administered.  Sagittal and coronal reformats were performed.    FINDINGS:    CHEST:     LUNGS AND LARGE AIRWAYS: Patent central airways. There is a 4.5 x 5.6 x   7.3 cm right upper lobe mass which exerts mass effect on the SVC and is   inseparable from the mediastinum and pleura extending inferiorly to the   right hilum and superiorly to the right lung apex. There is diffuse   pleural-based mass extending superiorly to the right lung apex. Multiple   pleural-based nodules are also present more inferiorly inseparable from   the mediastinum. There is mild interstitial prominence of the aerated   portion of the right lung. There is compressive atelectasis of the right   lower lobe related to the presence of pleural effusion. Interstitial   prominence peripherally of the left lung.  PLEURA: Moderate right pleural effusion. Diffuse right pleural   involvement as above.  VESSELS: The thoracic aorta and main pulmonary artery are normal in   caliber. The superior vena cava appears somewhat narrowed by compression   from the right lung mass. No central pulmonary embolism.  HEART: Heart size is normal. Pacemaker leads in the right atrium and   right ventricle. No pericardial effusion.  MEDIASTINUM AND SABA: No mediastinal or right hilar lymphadenopathy.   Right upper lobe mass contiguous with right hilum as described above.   There is a 1.4 x 1.2 cm right pericardial lymph node on image #61/series   2.  CHEST WALL AND LOWER NECK: The thyroid gland is within normal limits.   There are no supraclavicular or axillary lymphadenopathy.    ABDOMEN AND PELVIS:    LIVER: Subcentimeter hypodensity in the left hepatic lobe on image   #70/series 2.  2. Small to further characterize.  BILE DUCTS: Normal caliber.  GALLBLADDER: Status postcholecystectomy.  SPLEEN: Within normal limits.  PANCREAS: Within normal limits.  ADRENALS: Within normal limits.  KIDNEYS/URETERS: No renal stones or hydronephrosis. There is a large left   parapelvic cyst with exophytic component measuring 7 x 6 cm.    BLADDER: Within normal limits.  REPRODUCTIVE ORGANS: Prostate within normal limits. There is a penile   prosthesis with reservoir anterosuperior to the urinary bladder.    BOWEL: No bowel obstruction. Appendix is not definitively identified.   There is colonic diverticulosis without diverticulitis.  PERITONEUM: No ascites.  VESSELS: The aorta and IVC are normal in caliber and patent. There is   atherosclerosis of the aorta.  RETROPERITONEUM/LYMPH NODES: No lymphadenopathy.    ABDOMINAL WALL: Within normal limits. Pacemaker generator pack in the   left anterior chest wall with leads through a 6 left subclavian approach.  BONES: Within normal limits.    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 Details are as above.    No evidence for metastatic disease in the abdomen and pelvis.                    ADELA SRINIVASAN M.D. ATTENDING RADIOLOGIST  This document has been electronically signed. Nov 8 2019  4:49PM    EXAM:  CT CHEST                            PROCEDURE DATE:  11/07/2019          INTERPRETATION:  CLINICAL INFORMATION: Fever and chest congestion    COMPARISON: None.    PROCEDURE:   CT of the Chest was performed without intravenous contrast.  Sagittal and coronal reformats were performed.      FINDINGS:    LUNGS AND AIRWAYS AND PLEURA: Patent central airways.  Right apical   nodular mass, likely a pleural-based mass, measuring at least up to 10 cm   in craniocaudal dimension, 8 cm in transverse dimension, and 4.3 cm in AP   dimension which extends along the pleural surface, mediastinal surface,   and up to the right hilum. Nodularity also seen along the surface of the   diaphragm. Bibasilar dependent atelectasis and scattered bands of   platelike atelectasis. Small right pleural effusion.    MEDIASTINUM AND SABA: No significant mediastinal lymphadenopathy.    VESSELS: Normal caliber thoracic aorta and main pulmonary artery.   Atherosclerotic ossifications of the thoracic aorta, great vessels, and   coronary arteries.    HEART: Heart size is normal. No pericardial effusion. Cardiac pacemaker   leads in right atrium and right ventricle.    CHEST WALL AND LOWER NECK: Left chest wall pacemaker.    VISUALIZED UPPER ABDOMEN: Cholecystectomy clips. Incompletely visualized   left renal cyst.    BONES: Degenerative changes of the spine.    IMPRESSION:     Right apical nodular mass presumably a pleural-based mass/malignancy   measuring up to 10 cm (CC) x 8 cm (TX) x 4.3 cm (AP). Nodular mass   extends along the pleural surface, mediastinal surface, and up to the   right hilum. Nodularity also seen along the surface of the right   hemidiaphragm. Small right pleural effusion. Considerations include   mesothelioma, lymphoma, direct extension from bronchogenic carcinoma   which is difficult to appreciate on this unenhanced exam, or   pleural-based metastases.                    JOSIAH RHODES D.O. ATTENDING RADIOLOGIST  This document has been electronically signed. Nov 7 2019  7:58PM Patient is a 89y old  Male who presents with a chief complaint of lung mass (11 Nov 2019 06:24)      HPI:  89yo M, w/ PMH/o heart block s/p PPM ([employer], last interrogated 9/17/2019, implanted 5/2011 serial # [id number] model [id number]), carotid artery disease, h/o TIAs (last episode ~5yrs ago), HTN, HLD, h/o prostate cancer, h/o diverticulitis, gastritis, presenting to the ED at the instruction of PCP Dr. Amado recommending chest CT after outpatient CXR with concerning results.  Pt was in his normal state of health until ~1mo ago when he noticed generalized weakness and feeling unwell. He expresses "getting ready in the AM is a chore." He presented to his PCP who appreciated crackles per pt report and pt was sent for CXR that was negative. At that time, pt was prescribed abx for possible occult PNA. Pt's symptoms were not relieved with abx therapy and was unable to follow up with chest CT for insurance reasons. Now pt c/o fevers of 3-day duration with Tmax at home 100.5. Fevers are responsive to Tylenol but recur. In addition to fevers, pt also c/o frequent gas-like pressure in his chest relieved with belching. Pt presented to his PCP again today for further evaluation of fevers and lack of improvement of described symptoms and completed CXR prior to ED visit. No family h/o lung cancer. Pt previously worked in [employer] industry. Endorses chills and chronic constipation, but otherwise denies unexplainable weight loss, new headaches, vision changes, chest pain, palpitations, cough, SOB, abdominal pain, N/V/D, urinary symptoms, new myalgia/arthralgia.    In the ED  VS T 97.9 /94 HR 92 RR 16 SpO2 95%  Labs WNL.  CT chest: Right apical nodular mass presumably a pleural-based mass/malignancy   measuring up to 10 cm (CC) x 8 cm (TX) x 4.3 cm (AP). Nodular mass   extends along the pleural surface, mediastinal surface, and up to the   right hilum. Nodularity also seen along the surface of the right   hemidiaphragm. Small right pleural effusion. Considerations include mesothelioma, lymphoma, direct extension from bronchogenic carcinoma   which is difficult to appreciate on this unenhanced exam, or pleural-based metastases.  EKG: sinus rhythm with premature supraventricular complexes, LAD, nonspecific intraventricular block, age-undetermined possible lateral and anterior infarct    Received 2.7L NS, 1x DuoNeb. (07 Nov 2019 21:42)    INTERVAL HISTORY:      PAST MEDICAL & SURGICAL HISTORY:  Pacemaker: 2007  Diverticulitis  TIA (transient ischemic attack): last TIA over 5 years ago as per patient  Prostate cancer  Carpal tunnel syndrome  HTN (hypertension)  Hyperlipidemia  GERD (gastroesophageal reflux disease)  Heart block  History of cataract surgery: bilateral  S/P cholecystectomy  Pacemaker: [employer] Model # [id number] Serial #[id number]  implant date 05/20/2011            ECHO  FINDINGS:      MEDICATIONS  (STANDING):  atorvastatin 20 milliGRAM(s) Oral at bedtime  metoprolol succinate  milliGRAM(s) Oral daily  pantoprazole    Tablet 40 milliGRAM(s) Oral before breakfast  sodium chloride 0.9%. 1000 milliLiter(s) (30 mL/Hr) IV Continuous <Continuous>    MEDICATIONS  (PRN):  acetaminophen   Tablet .. 1000 milliGRAM(s) Oral every 6 hours PRN Mild Pain (1 - 3)  aluminum hydroxide/magnesium hydroxide/simethicone Suspension 30 milliLiter(s) Oral every 6 hours PRN Dyspepsia      FAMILY HISTORY:    Denies Family history of CAD or early MI      Constitutional: denies fever, chills  HEENT: denies blurry vision, difficulty hearing  Respiratory: denies SOB, GALVAN, cough  Cardiovascular: denies CP, palpitations, orthopnea, PND, LE edema  Gastrointestinal: denies nausea, vomiting, abdominal pain  Genitourinary: denies urinary changes  Skin: Denies rashes, itching  Neurologic: denies headache, weakness, dizziness  Hematology/Oncology: denies bleeding, easy bruising  ROS negative except as noted above      SOCIAL HISTORY:    No tobacco, Alcohol or drug use    Vital Signs Last 24 Hrs  T(C): 36.8 (11 Nov 2019 07:25), Max: 37.1 (11 Nov 2019 00:13)  T(F): 98.3 (11 Nov 2019 07:25), Max: 98.7 (11 Nov 2019 00:13)  HR: 93 (11 Nov 2019 07:25) (93 - 109)  BP: 128/78 (11 Nov 2019 07:25) (128/78 - 147/77)  BP(mean): --  RR: 17 (11 Nov 2019 07:25) (17 - 18)  SpO2: 94% (11 Nov 2019 07:25) (91% - 95%)    Physical Exam:  General: Well developed, well nourished, NAD  HEENT: NCAT, PERRLA, EOMI bl, moist mucous membranes   Neck: Supple, nontender, no mass  Neurology: A&Ox3, nonfocal, sensation intact   Respiratory: CTA B/L, No W/R/R  CV: RRR, +S1/S2, no murmurs, rubs or gallops  Abdominal: Soft, NT, ND +BSx4, no palpable masses  Extremities: No C/C/E, + peripheral pulses  MSK: Normal ROM, no joint erythema or warmth, no joint swelling   Heme: No obvious ecchymosis or petechiae   Skin: warm, dry, normal color      ECG:  Electronic ventricular pacemaker      Ventricular Rate 92 BPM    Atrial Rate 92 BPM    P-R Interval 182 ms    QRS Duration 154 ms    Q-T Interval 428 ms    QTC Calculation(Bezet) 529 ms    P Axis 42 degrees    R Axis -73 degrees    T Axis 77 degrees    Diagnosis Line Electronic ventricular pacemaker  Confirmed by reid Ojeda ([id number]) on 11/8/2019 3:36:50 PM      I&O's Detail      LABS:                        13.0   9.32  )-----------( 325      ( 11 Nov 2019 07:06 )             40.4     11-11    138  |  104  |  19  ----------------------------<  106<H>  4.4   |  29  |  0.85    Ca    9.4      11 Nov 2019 07:06  Mg     2.2     11-10          PT/INR - ( 10 Nov 2019 06:59 )   PT: 14.5 sec;   INR: 1.27 ratio         PTT - ( 10 Nov 2019 06:59 )  PTT:32.5 sec    I&O's Summary    BNP    RADIOLOGY & ADDITIONAL STUDIES:  EXAM:  CT CHEST OC IC                          EXAM:  CT ABDOMEN AND PELVIS OC IC                            PROCEDURE DATE:  11/08/2019          INTERPRETATION:  CLINICAL INFORMATION: Right lung mass, for staging.    COMPARISON: Chest CT 11/7/2019, CT abdomen pelvis 6/7/2019.    PROCEDURE:   CT of the Chest, Abdomen and Pelvis was performed with intravenous   contrast.   Intravenous contrast: 90 ml Omnipaque 350. 10 ml discarded.  Oral contrast: Positive contrast was administered.  Sagittal and coronal reformats were performed.    FINDINGS:    CHEST:     LUNGS AND LARGE AIRWAYS: Patent central airways. There is a 4.5 x 5.6 x   7.3 cm right upper lobe mass which exerts mass effect on the SVC and is   inseparable from the mediastinum and pleura extending inferiorly to the   right hilum and superiorly to the right lung apex. There is diffuse   pleural-based mass extending superiorly to the right lung apex. Multiple   pleural-based nodules are also present more inferiorly inseparable from   the mediastinum. There is mild interstitial prominence of the aerated   portion of the right lung. There is compressive atelectasis of the right   lower lobe related to the presence of pleural effusion. Interstitial   prominence peripherally of the left lung.  PLEURA: Moderate right pleural effusion. Diffuse right pleural   involvement as above.  VESSELS: The thoracic aorta and main pulmonary artery are normal in   caliber. The superior vena cava appears somewhat narrowed by compression   from the right lung mass. No central pulmonary embolism.  HEART: Heart size is normal. Pacemaker leads in the right atrium and   right ventricle. No pericardial effusion.  MEDIASTINUM AND SABA: No mediastinal or right hilar lymphadenopathy.   Right upper lobe mass contiguous with right hilum as described above.   There is a 1.4 x 1.2 cm right pericardial lymph node on image #61/series   2.  CHEST WALL AND LOWER NECK: The thyroid gland is within normal limits.   There are no supraclavicular or axillary lymphadenopathy.    ABDOMEN AND PELVIS:    LIVER: Subcentimeter hypodensity in the left hepatic lobe on image   #70/series 2.  2. Small to further characterize.  BILE DUCTS: Normal caliber.  GALLBLADDER: Status postcholecystectomy.  SPLEEN: Within normal limits.  PANCREAS: Within normal limits.  ADRENALS: Within normal limits.  KIDNEYS/URETERS: No renal stones or hydronephrosis. There is a large left   parapelvic cyst with exophytic component measuring 7 x 6 cm.    BLADDER: Within normal limits.  REPRODUCTIVE ORGANS: Prostate within normal limits. There is a penile   prosthesis with reservoir anterosuperior to the urinary bladder.    BOWEL: No bowel obstruction. Appendix is not definitively identified.   There is colonic diverticulosis without diverticulitis.  PERITONEUM: No ascites.  VESSELS: The aorta and IVC are normal in caliber and patent. There is   atherosclerosis of the aorta.  RETROPERITONEUM/LYMPH NODES: No lymphadenopathy.    ABDOMINAL WALL: Within normal limits. Pacemaker generator pack in the   left anterior chest wall with leads through a 6 left subclavian approach.  BONES: Within normal limits.    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 Details are as above.    No evidence for metastatic disease in the abdomen and pelvis.                    ADELA SRINIVASAN M.D. ATTENDING RADIOLOGIST  This document has been electronically signed. Nov 8 2019  4:49PM            EXAM:  CT CHEST OC IC                          EXAM:  CT ABDOMEN AND PELVIS OC IC                            PROCEDURE DATE:  11/08/2019          INTERPRETATION:  CLINICAL INFORMATION: Right lung mass, for staging.    COMPARISON: Chest CT 11/7/2019, CT abdomen pelvis 6/7/2019.    PROCEDURE:   CT of the Chest, Abdomen and Pelvis was performed with intravenous   contrast.   Intravenous contrast: 90 ml Omnipaque 350. 10 ml discarded.  Oral contrast: Positive contrast was administered.  Sagittal and coronal reformats were performed.    FINDINGS:    CHEST:     LUNGS AND LARGE AIRWAYS: Patent central airways. There is a 4.5 x 5.6 x   7.3 cm right upper lobe mass which exerts mass effect on the SVC and is   inseparable from the mediastinum and pleura extending inferiorly to the   right hilum and superiorly to the right lung apex. There is diffuse   pleural-based mass extending superiorly to the right lung apex. Multiple   pleural-based nodules are also present more inferiorly inseparable from   the mediastinum. There is mild interstitial prominence of the aerated   portion of the right lung. There is compressive atelectasis of the right   lower lobe related to the presence of pleural effusion. Interstitial   prominence peripherally of the left lung.  PLEURA: Moderate right pleural effusion. Diffuse right pleural   involvement as above.  VESSELS: The thoracic aorta and main pulmonary artery are normal in   caliber. The superior vena cava appears somewhat narrowed by compression   from the right lung mass. No central pulmonary embolism.  HEART: Heart size is normal. Pacemaker leads in the right atrium and   right ventricle. No pericardial effusion.  MEDIASTINUM AND SABA: No mediastinal or right hilar lymphadenopathy.   Right upper lobe mass contiguous with right hilum as described above.   There is a 1.4 x 1.2 cm right pericardial lymph node on image #61/series   2.  CHEST WALL AND LOWER NECK: The thyroid gland is within normal limits.   There are no supraclavicular or axillary lymphadenopathy.    ABDOMEN AND PELVIS:    LIVER: Subcentimeter hypodensity in the left hepatic lobe on image   #70/series 2.  2. Small to further characterize.  BILE DUCTS: Normal caliber.  GALLBLADDER: Status postcholecystectomy.  SPLEEN: Within normal limits.  PANCREAS: Within normal limits.  ADRENALS: Within normal limits.  KIDNEYS/URETERS: No renal stones or hydronephrosis. There is a large left   parapelvic cyst with exophytic component measuring 7 x 6 cm.    BLADDER: Within normal limits.  REPRODUCTIVE ORGANS: Prostate within normal limits. There is a penile   prosthesis with reservoir anterosuperior to the urinary bladder.    BOWEL: No bowel obstruction. Appendix is not definitively identified.   There is colonic diverticulosis without diverticulitis.  PERITONEUM: No ascites.  VESSELS: The aorta and IVC are normal in caliber and patent. There is   atherosclerosis of the aorta.  RETROPERITONEUM/LYMPH NODES: No lymphadenopathy.    ABDOMINAL WALL: Within normal limits. Pacemaker generator pack in the   left anterior chest wall with leads through a 6 left subclavian approach.  BONES: Within normal limits.    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 Details are as above.    No evidence for metastatic disease in the abdomen and pelvis.                    ADELA SRINIVASAN M.D. ATTENDING RADIOLOGIST  This document has been electronically signed. Nov 8 2019  4:49PM    EXAM:  CT CHEST                            PROCEDURE DATE:  11/07/2019          INTERPRETATION:  CLINICAL INFORMATION: Fever and chest congestion    COMPARISON: None.    PROCEDURE:   CT of the Chest was performed without intravenous contrast.  Sagittal and coronal reformats were performed.      FINDINGS:    LUNGS AND AIRWAYS AND PLEURA: Patent central airways.  Right apical   nodular mass, likely a pleural-based mass, measuring at least up to 10 cm   in craniocaudal dimension, 8 cm in transverse dimension, and 4.3 cm in AP   dimension which extends along the pleural surface, mediastinal surface,   and up to the right hilum. Nodularity also seen along the surface of the   diaphragm. Bibasilar dependent atelectasis and scattered bands of   platelike atelectasis. Small right pleural effusion.    MEDIASTINUM AND SABA: No significant mediastinal lymphadenopathy.    VESSELS: Normal caliber thoracic aorta and main pulmonary artery.   Atherosclerotic ossifications of the thoracic aorta, great vessels, and   coronary arteries.    HEART: Heart size is normal. No pericardial effusion. Cardiac pacemaker   leads in right atrium and right ventricle.    CHEST WALL AND LOWER NECK: Left chest wall pacemaker.    VISUALIZED UPPER ABDOMEN: Cholecystectomy clips. Incompletely visualized   left renal cyst.    BONES: Degenerative changes of the spine.    IMPRESSION:     Right apical nodular mass presumably a pleural-based mass/malignancy   measuring up to 10 cm (CC) x 8 cm (TX) x 4.3 cm (AP). Nodular mass   extends along the pleural surface, mediastinal surface, and up to the   right hilum. Nodularity also seen along the surface of the right   hemidiaphragm. Small right pleural effusion. Considerations include   mesothelioma, lymphoma, direct extension from bronchogenic carcinoma   which is difficult to appreciate on this unenhanced exam, or   pleural-based metastases.                    JOSIAH RHODES D.O. ATTENDING RADIOLOGIST  This document has been electronically signed. Nov 7 2019  7:58PM Patient is a 89y old  Male who presents with a chief complaint of lung mass (11 Nov 2019 06:24)      HPI:  89yo M, w/ PMH/o heart block s/p PPM ([employer], last interrogated 9/17/2019, implanted 5/2011 serial # [id number] model [id number]), carotid artery disease, h/o TIAs (last episode ~5yrs ago), HTN, HLD, h/o prostate cancer, h/o diverticulitis, gastritis, presenting to the ED at the instruction of PCP Dr. Amado recommending chest CT after outpatient CXR with concerning results.  Pt was in his normal state of health until ~1mo ago when he noticed generalized weakness and feeling unwell. He expresses "getting ready in the AM is a chore." He presented to his PCP who appreciated crackles per pt report and pt was sent for CXR that was negative. At that time, pt was prescribed abx for possible occult PNA. Pt's symptoms were not relieved with abx therapy and was unable to follow up with chest CT for insurance reasons. Now pt c/o fevers of 3-day duration with Tmax at home 100.5. Fevers are responsive to Tylenol but recur. In addition to fevers, pt also c/o frequent gas-like pressure in his chest relieved with belching. Pt presented to his PCP again today for further evaluation of fevers and lack of improvement of described symptoms and completed CXR prior to ED visit. No family h/o lung cancer. Pt previously worked in [employer] industry. Endorses chills and chronic constipation, but otherwise denies unexplainable weight loss, new headaches, vision changes, chest pain, palpitations, cough, SOB, abdominal pain, N/V/D, urinary symptoms, new myalgia/arthralgia.    In the ED  VS T 97.9 /94 HR 92 RR 16 SpO2 95%  Labs WNL.  CT chest: Right apical nodular mass presumably a pleural-based mass/malignancy   measuring up to 10 cm (CC) x 8 cm (TX) x 4.3 cm (AP). Nodular mass   extends along the pleural surface, mediastinal surface, and up to the   right hilum. Nodularity also seen along the surface of the right   hemidiaphragm. Small right pleural effusion. Considerations include mesothelioma, lymphoma, direct extension from bronchogenic carcinoma   which is difficult to appreciate on this unenhanced exam, or pleural-based metastases.  EKG: sinus rhythm with premature supraventricular complexes, LAD, nonspecific intraventricular block, age-undetermined possible lateral and anterior infarct    Received 2.7L NS, 1x DuoNeb. (07 Nov 2019 21:42)    INTERVAL HISTORY:      PAST MEDICAL & SURGICAL HISTORY:  Pacemaker: 2007  Diverticulitis  TIA (transient ischemic attack): last TIA over 5 years ago as per patient  Prostate cancer  Carpal tunnel syndrome  HTN (hypertension)  Hyperlipidemia  GERD (gastroesophageal reflux disease)  Heart block  History of cataract surgery: bilateral  S/P cholecystectomy  Pacemaker: [employer] Model # [id number] Serial #[id number]  implant date 05/20/2011            ECHO  FINDINGS:      MEDICATIONS  (STANDING):  atorvastatin 20 milliGRAM(s) Oral at bedtime  metoprolol succinate  milliGRAM(s) Oral daily  pantoprazole    Tablet 40 milliGRAM(s) Oral before breakfast  sodium chloride 0.9%. 1000 milliLiter(s) (30 mL/Hr) IV Continuous <Continuous>    MEDICATIONS  (PRN):  acetaminophen   Tablet .. 1000 milliGRAM(s) Oral every 6 hours PRN Mild Pain (1 - 3)  aluminum hydroxide/magnesium hydroxide/simethicone Suspension 30 milliLiter(s) Oral every 6 hours PRN Dyspepsia      FAMILY HISTORY:  Denies Family history of CAD or early MI      Constitutional: denies fever, chills  HEENT: denies blurry vision, difficulty hearing  Respiratory: denies SOB, GALVAN, cough  Cardiovascular: denies CP, palpitations, orthopnea, PND, LE edema  Gastrointestinal: denies nausea, vomiting, abdominal pain  Genitourinary: denies urinary changes  Skin: Denies rashes, itching  Neurologic: denies headache, weakness, dizziness  Hematology/Oncology: denies bleeding, easy bruising  ROS negative except as noted above      SOCIAL HISTORY:  NoAlcohol or drug use  Former smoker, quit 50yrs ago, smoked 1ppd    Vital Signs Last 24 Hrs  T(C): 36.8 (11 Nov 2019 07:25), Max: 37.1 (11 Nov 2019 00:13)  T(F): 98.3 (11 Nov 2019 07:25), Max: 98.7 (11 Nov 2019 00:13)  HR: 93 (11 Nov 2019 07:25) (93 - 109)  BP: 128/78 (11 Nov 2019 07:25) (128/78 - 147/77)  BP(mean): --  RR: 17 (11 Nov 2019 07:25) (17 - 18)  SpO2: 94% (11 Nov 2019 07:25) (91% - 95%)    Physical Exam:  General: Well developed, well nourished, NAD  HEENT: NCAT, PERRLA, EOMI bl, moist mucous membranes   Neck: Supple, nontender, no mass  Neurology: A&Ox3, nonfocal, sensation intact   Respiratory: CTA B/L, No W/R/R  CV: RRR, +S1/S2, no murmurs, rubs or gallops  Abdominal: Soft, NT, ND +BSx4, no palpable masses  Extremities: No C/C/E, + peripheral pulses  MSK: Normal ROM, no joint erythema or warmth, no joint swelling   Heme: No obvious ecchymosis or petechiae   Skin: warm, dry, normal color      ECG:  Electronic ventricular pacemaker      Ventricular Rate 92 BPM    Atrial Rate 92 BPM    P-R Interval 182 ms    QRS Duration 154 ms    Q-T Interval 428 ms    QTC Calculation(Bezet) 529 ms    P Axis 42 degrees    R Axis -73 degrees    T Axis 77 degrees    Diagnosis Line Electronic ventricular pacemaker  Confirmed by reid Ojeda ([id number]) on 11/8/2019 3:36:50 PM      I&O's Detail      LABS:                        13.0   9.32  )-----------( 325      ( 11 Nov 2019 07:06 )             40.4     11-11    138  |  104  |  19  ----------------------------<  106<H>  4.4   |  29  |  0.85    Ca    9.4      11 Nov 2019 07:06  Mg     2.2     11-10          PT/INR - ( 10 Nov 2019 06:59 )   PT: 14.5 sec;   INR: 1.27 ratio         PTT - ( 10 Nov 2019 06:59 )  PTT:32.5 sec    I&O's Summary    BNP    RADIOLOGY & ADDITIONAL STUDIES:  EXAM:  CT CHEST OC IC                          EXAM:  CT ABDOMEN AND PELVIS OC IC                            PROCEDURE DATE:  11/08/2019          INTERPRETATION:  CLINICAL INFORMATION: Right lung mass, for staging.    COMPARISON: Chest CT 11/7/2019, CT abdomen pelvis 6/7/2019.    PROCEDURE:   CT of the Chest, Abdomen and Pelvis was performed with intravenous   contrast.   Intravenous contrast: 90 ml Omnipaque 350. 10 ml discarded.  Oral contrast: Positive contrast was administered.  Sagittal and coronal reformats were performed.    FINDINGS:    CHEST:     LUNGS AND LARGE AIRWAYS: Patent central airways. There is a 4.5 x 5.6 x   7.3 cm right upper lobe mass which exerts mass effect on the SVC and is   inseparable from the mediastinum and pleura extending inferiorly to the   right hilum and superiorly to the right lung apex. There is diffuse   pleural-based mass extending superiorly to the right lung apex. Multiple   pleural-based nodules are also present more inferiorly inseparable from   the mediastinum. There is mild interstitial prominence of the aerated   portion of the right lung. There is compressive atelectasis of the right   lower lobe related to the presence of pleural effusion. Interstitial   prominence peripherally of the left lung.  PLEURA: Moderate right pleural effusion. Diffuse right pleural   involvement as above.  VESSELS: The thoracic aorta and main pulmonary artery are normal in   caliber. The superior vena cava appears somewhat narrowed by compression   from the right lung mass. No central pulmonary embolism.  HEART: Heart size is normal. Pacemaker leads in the right atrium and   right ventricle. No pericardial effusion.  MEDIASTINUM AND SABA: No mediastinal or right hilar lymphadenopathy.   Right upper lobe mass contiguous with right hilum as described above.   There is a 1.4 x 1.2 cm right pericardial lymph node on image #61/series   2.  CHEST WALL AND LOWER NECK: The thyroid gland is within normal limits.   There are no supraclavicular or axillary lymphadenopathy.    ABDOMEN AND PELVIS:    LIVER: Subcentimeter hypodensity in the left hepatic lobe on image   #70/series 2.  2. Small to further characterize.  BILE DUCTS: Normal caliber.  GALLBLADDER: Status postcholecystectomy.  SPLEEN: Within normal limits.  PANCREAS: Within normal limits.  ADRENALS: Within normal limits.  KIDNEYS/URETERS: No renal stones or hydronephrosis. There is a large left   parapelvic cyst with exophytic component measuring 7 x 6 cm.    BLADDER: Within normal limits.  REPRODUCTIVE ORGANS: Prostate within normal limits. There is a penile   prosthesis with reservoir anterosuperior to the urinary bladder.    BOWEL: No bowel obstruction. Appendix is not definitively identified.   There is colonic diverticulosis without diverticulitis.  PERITONEUM: No ascites.  VESSELS: The aorta and IVC are normal in caliber and patent. There is   atherosclerosis of the aorta.  RETROPERITONEUM/LYMPH NODES: No lymphadenopathy.    ABDOMINAL WALL: Within normal limits. Pacemaker generator pack in the   left anterior chest wall with leads through a 6 left subclavian approach.  BONES: Within normal limits.    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 Details are as above.    No evidence for metastatic disease in the abdomen and pelvis.                    ADELA SRINIVASAN M.D. ATTENDING RADIOLOGIST  This document has been electronically signed. Nov 8 2019  4:49PM            EXAM:  CT CHEST OC IC                          EXAM:  CT ABDOMEN AND PELVIS OC IC                            PROCEDURE DATE:  11/08/2019          INTERPRETATION:  CLINICAL INFORMATION: Right lung mass, for staging.    COMPARISON: Chest CT 11/7/2019, CT abdomen pelvis 6/7/2019.    PROCEDURE:   CT of the Chest, Abdomen and Pelvis was performed with intravenous   contrast.   Intravenous contrast: 90 ml Omnipaque 350. 10 ml discarded.  Oral contrast: Positive contrast was administered.  Sagittal and coronal reformats were performed.    FINDINGS:    CHEST:     LUNGS AND LARGE AIRWAYS: Patent central airways. There is a 4.5 x 5.6 x   7.3 cm right upper lobe mass which exerts mass effect on the SVC and is   inseparable from the mediastinum and pleura extending inferiorly to the   right hilum and superiorly to the right lung apex. There is diffuse   pleural-based mass extending superiorly to the right lung apex. Multiple   pleural-based nodules are also present more inferiorly inseparable from   the mediastinum. There is mild interstitial prominence of the aerated   portion of the right lung. There is compressive atelectasis of the right   lower lobe related to the presence of pleural effusion. Interstitial   prominence peripherally of the left lung.  PLEURA: Moderate right pleural effusion. Diffuse right pleural   involvement as above.  VESSELS: The thoracic aorta and main pulmonary artery are normal in   caliber. The superior vena cava appears somewhat narrowed by compression   from the right lung mass. No central pulmonary embolism.  HEART: Heart size is normal. Pacemaker leads in the right atrium and   right ventricle. No pericardial effusion.  MEDIASTINUM AND SABA: No mediastinal or right hilar lymphadenopathy.   Right upper lobe mass contiguous with right hilum as described above.   There is a 1.4 x 1.2 cm right pericardial lymph node on image #61/series   2.  CHEST WALL AND LOWER NECK: The thyroid gland is within normal limits.   There are no supraclavicular or axillary lymphadenopathy.    ABDOMEN AND PELVIS:    LIVER: Subcentimeter hypodensity in the left hepatic lobe on image   #70/series 2.  2. Small to further characterize.  BILE DUCTS: Normal caliber.  GALLBLADDER: Status postcholecystectomy.  SPLEEN: Within normal limits.  PANCREAS: Within normal limits.  ADRENALS: Within normal limits.  KIDNEYS/URETERS: No renal stones or hydronephrosis. There is a large left   parapelvic cyst with exophytic component measuring 7 x 6 cm.    BLADDER: Within normal limits.  REPRODUCTIVE ORGANS: Prostate within normal limits. There is a penile   prosthesis with reservoir anterosuperior to the urinary bladder.    BOWEL: No bowel obstruction. Appendix is not definitively identified.   There is colonic diverticulosis without diverticulitis.  PERITONEUM: No ascites.  VESSELS: The aorta and IVC are normal in caliber and patent. There is   atherosclerosis of the aorta.  RETROPERITONEUM/LYMPH NODES: No lymphadenopathy.    ABDOMINAL WALL: Within normal limits. Pacemaker generator pack in the   left anterior chest wall with leads through a 6 left subclavian approach.  BONES: Within normal limits.    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 Details are as above.    No evidence for metastatic disease in the abdomen and pelvis.                    ADELA SRINIVASAN M.D. ATTENDING RADIOLOGIST  This document has been electronically signed. Nov 8 2019  4:49PM    EXAM:  CT CHEST                            PROCEDURE DATE:  11/07/2019          INTERPRETATION:  CLINICAL INFORMATION: Fever and chest congestion    COMPARISON: None.    PROCEDURE:   CT of the Chest was performed without intravenous contrast.  Sagittal and coronal reformats were performed.      FINDINGS:    LUNGS AND AIRWAYS AND PLEURA: Patent central airways.  Right apical   nodular mass, likely a pleural-based mass, measuring at least up to 10 cm   in craniocaudal dimension, 8 cm in transverse dimension, and 4.3 cm in AP   dimension which extends along the pleural surface, mediastinal surface,   and up to the right hilum. Nodularity also seen along the surface of the   diaphragm. Bibasilar dependent atelectasis and scattered bands of   platelike atelectasis. Small right pleural effusion.    MEDIASTINUM AND SABA: No significant mediastinal lymphadenopathy.    VESSELS: Normal caliber thoracic aorta and main pulmonary artery.   Atherosclerotic ossifications of the thoracic aorta, great vessels, and   coronary arteries.    HEART: Heart size is normal. No pericardial effusion. Cardiac pacemaker   leads in right atrium and right ventricle.    CHEST WALL AND LOWER NECK: Left chest wall pacemaker.    VISUALIZED UPPER ABDOMEN: Cholecystectomy clips. Incompletely visualized   left renal cyst.    BONES: Degenerative changes of the spine.    IMPRESSION:     Right apical nodular mass presumably a pleural-based mass/malignancy   measuring up to 10 cm (CC) x 8 cm (TX) x 4.3 cm (AP). Nodular mass   extends along the pleural surface, mediastinal surface, and up to the   right hilum. Nodularity also seen along the surface of the right   hemidiaphragm. Small right pleural effusion. Considerations include   mesothelioma, lymphoma, direct extension from bronchogenic carcinoma   which is difficult to appreciate on this unenhanced exam, or   pleural-based metastases.                    JOSIAH RHODES D.O. ATTENDING RADIOLOGIST  This document has been electronically signed. Nov 7 2019  7:58PM Patient is a 89y old  Male who presents with a chief complaint of lung mass (11 Nov 2019 06:24)      HPI:  89yo M, w/ PMH/o heart block s/p PPM ([employer], last interrogated 9/17/2019, implanted 5/2011 serial # [id number] model [id number]), carotid artery disease, h/o TIAs (last episode ~5yrs ago), HTN, HLD, h/o prostate cancer, h/o diverticulitis, gastritis, presenting to the ED at the instruction of PCP Dr. Amado recommending chest CT after outpatient CXR with concerning results.  Pt was in his normal state of health until ~1mo ago when he noticed generalized weakness and feeling unwell. He expresses "getting ready in the AM is a chore." He presented to his PCP who appreciated crackles per pt report and pt was sent for CXR that was negative. At that time, pt was prescribed abx for possible occult PNA. Pt's symptoms were not relieved with abx therapy and was unable to follow up with chest CT for insurance reasons. Now pt c/o fevers of 3-day duration with Tmax at home 100.5. Fevers are responsive to Tylenol but recur. In addition to fevers, pt also c/o frequent gas-like pressure in his chest relieved with belching. Pt presented to his PCP again today for further evaluation of fevers and lack of improvement of described symptoms and completed CXR prior to ED visit. No family h/o lung cancer. Pt previously worked in [employer] industry. Endorses chills and chronic constipation, but otherwise denies unexplainable weight loss, new headaches, vision changes, chest pain, palpitations, cough, SOB, abdominal pain, N/V/D, urinary symptoms, new myalgia/arthralgia.    In the ED  VS T 97.9 /94 HR 92 RR 16 SpO2 95%  Labs WNL.  CT chest: Right apical nodular mass presumably a pleural-based mass/malignancy   measuring up to 10 cm (CC) x 8 cm (TX) x 4.3 cm (AP). Nodular mass   extends along the pleural surface, mediastinal surface, and up to the   right hilum. Nodularity also seen along the surface of the right   hemidiaphragm. Small right pleural effusion. Considerations include mesothelioma, lymphoma, direct extension from bronchogenic carcinoma   which is difficult to appreciate on this unenhanced exam, or pleural-based metastases.  EKG: sinus rhythm with premature supraventricular complexes, LAD, nonspecific intraventricular block, age-undetermined possible lateral and anterior infarct    Received 2.7L NS, 1x DuoNeb. (07 Nov 2019 21:42)    INTERVAL HISTORY:      PAST MEDICAL & SURGICAL HISTORY:  Pacemaker: 2007  Diverticulitis  TIA (transient ischemic attack): last TIA over 5 years ago as per patient  Prostate cancer  Carpal tunnel syndrome  HTN (hypertension)  Hyperlipidemia  GERD (gastroesophageal reflux disease)  Heart block  History of cataract surgery: bilateral  S/P cholecystectomy  Pacemaker: [employer] Model # [id number] Serial #[id number]  implant date 05/20/2011            ECHO  FINDINGS:      MEDICATIONS  (STANDING):  atorvastatin 20 milliGRAM(s) Oral at bedtime  metoprolol succinate  milliGRAM(s) Oral daily  pantoprazole    Tablet 40 milliGRAM(s) Oral before breakfast  sodium chloride 0.9%. 1000 milliLiter(s) (30 mL/Hr) IV Continuous <Continuous>    MEDICATIONS  (PRN):  acetaminophen   Tablet .. 1000 milliGRAM(s) Oral every 6 hours PRN Mild Pain (1 - 3)  aluminum hydroxide/magnesium hydroxide/simethicone Suspension 30 milliLiter(s) Oral every 6 hours PRN Dyspepsia      FAMILY HISTORY:  Denies Family history of CAD or early MI      Constitutional: denies fever, chills  HEENT: denies blurry vision, difficulty hearing  Respiratory: denies SOB, GALVAN, cough  Cardiovascular: denies CP, palpitations, orthopnea, PND, LE edema  Gastrointestinal: denies nausea, vomiting, abdominal pain  Genitourinary: denies urinary changes  Skin: Denies rashes, itching  Neurologic: denies headache, weakness, dizziness  Hematology/Oncology: denies bleeding, easy bruising  ROS negative except as noted above      SOCIAL HISTORY:  Former smoker, quit 50yrs ago, smoked 1ppd  No Alcohol or drug use    Vital Signs Last 24 Hrs  T(C): 36.8 (11 Nov 2019 07:25), Max: 37.1 (11 Nov 2019 00:13)  T(F): 98.3 (11 Nov 2019 07:25), Max: 98.7 (11 Nov 2019 00:13)  HR: 93 (11 Nov 2019 07:25) (93 - 109)  BP: 128/78 (11 Nov 2019 07:25) (128/78 - 147/77)  BP(mean): --  RR: 17 (11 Nov 2019 07:25) (17 - 18)  SpO2: 94% (11 Nov 2019 07:25) (91% - 95%)    Physical Exam:  General: Well developed, well nourished, NAD  HEENT: NCAT, PERRLA, EOMI b/l, moist mucous membranes   Neck: Supple, nontender, no mass  Neurology: A&Ox3, nonfocal, sensation intact   Respiratory: lung sounds diminished on right lung base, No W/R/R  CV: RRR, +S1/S2, no murmurs, rubs or gallops  Abdominal: Soft, NT, ND +BSx4, no palpable masses  Extremities: trace LE edema b/l, No C/C, + peripheral pulses  MSK: Normal ROM, no joint erythema or warmth, no joint swelling   Heme: No obvious ecchymosis or petechiae   Skin: warm, dry, normal color    ECG:  Electronic ventricular pacemaker      Ventricular Rate 92 BPM    Atrial Rate 92 BPM    P-R Interval 182 ms    QRS Duration 154 ms    Q-T Interval 428 ms    QTC Calculation(Bezet) 529 ms    P Axis 42 degrees    R Axis -73 degrees    T Axis 77 degrees    Diagnosis Line Electronic ventricular pacemaker  Confirmed by reid Ojeda ([id number]) on 11/8/2019 3:36:50 PM      I&O's Detail      LABS:                        13.0   9.32  )-----------( 325      ( 11 Nov 2019 07:06 )             40.4     11-11    138  |  104  |  19  ----------------------------<  106<H>  4.4   |  29  |  0.85    Ca    9.4      11 Nov 2019 07:06  Mg     2.2     11-10          PT/INR - ( 10 Nov 2019 06:59 )   PT: 14.5 sec;   INR: 1.27 ratio         PTT - ( 10 Nov 2019 06:59 )  PTT:32.5 sec    I&O's Summary    BNP    RADIOLOGY & ADDITIONAL STUDIES:  EXAM:  CT CHEST OC IC                          EXAM:  CT ABDOMEN AND PELVIS OC IC                            PROCEDURE DATE:  11/08/2019          INTERPRETATION:  CLINICAL INFORMATION: Right lung mass, for staging.    COMPARISON: Chest CT 11/7/2019, CT abdomen pelvis 6/7/2019.    PROCEDURE:   CT of the Chest, Abdomen and Pelvis was performed with intravenous   contrast.   Intravenous contrast: 90 ml Omnipaque 350. 10 ml discarded.  Oral contrast: Positive contrast was administered.  Sagittal and coronal reformats were performed.    FINDINGS:    CHEST:     LUNGS AND LARGE AIRWAYS: Patent central airways. There is a 4.5 x 5.6 x   7.3 cm right upper lobe mass which exerts mass effect on the SVC and is   inseparable from the mediastinum and pleura extending inferiorly to the   right hilum and superiorly to the right lung apex. There is diffuse   pleural-based mass extending superiorly to the right lung apex. Multiple   pleural-based nodules are also present more inferiorly inseparable from   the mediastinum. There is mild interstitial prominence of the aerated   portion of the right lung. There is compressive atelectasis of the right   lower lobe related to the presence of pleural effusion. Interstitial   prominence peripherally of the left lung.  PLEURA: Moderate right pleural effusion. Diffuse right pleural   involvement as above.  VESSELS: The thoracic aorta and main pulmonary artery are normal in   caliber. The superior vena cava appears somewhat narrowed by compression   from the right lung mass. No central pulmonary embolism.  HEART: Heart size is normal. Pacemaker leads in the right atrium and   right ventricle. No pericardial effusion.  MEDIASTINUM AND SABA: No mediastinal or right hilar lymphadenopathy.   Right upper lobe mass contiguous with right hilum as described above.   There is a 1.4 x 1.2 cm right pericardial lymph node on image #61/series   2.  CHEST WALL AND LOWER NECK: The thyroid gland is within normal limits.   There are no supraclavicular or axillary lymphadenopathy.    ABDOMEN AND PELVIS:    LIVER: Subcentimeter hypodensity in the left hepatic lobe on image   #70/series 2.  2. Small to further characterize.  BILE DUCTS: Normal caliber.  GALLBLADDER: Status postcholecystectomy.  SPLEEN: Within normal limits.  PANCREAS: Within normal limits.  ADRENALS: Within normal limits.  KIDNEYS/URETERS: No renal stones or hydronephrosis. There is a large left   parapelvic cyst with exophytic component measuring 7 x 6 cm.    BLADDER: Within normal limits.  REPRODUCTIVE ORGANS: Prostate within normal limits. There is a penile   prosthesis with reservoir anterosuperior to the urinary bladder.    BOWEL: No bowel obstruction. Appendix is not definitively identified.   There is colonic diverticulosis without diverticulitis.  PERITONEUM: No ascites.  VESSELS: The aorta and IVC are normal in caliber and patent. There is   atherosclerosis of the aorta.  RETROPERITONEUM/LYMPH NODES: No lymphadenopathy.    ABDOMINAL WALL: Within normal limits. Pacemaker generator pack in the   left anterior chest wall with leads through a 6 left subclavian approach.  BONES: Within normal limits.    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 Details are as above.    No evidence for metastatic disease in the abdomen and pelvis.                    ADELA SRINIVASAN M.D. ATTENDING RADIOLOGIST  This document has been electronically signed. Nov 8 2019  4:49PM            EXAM:  CT CHEST OC IC                          EXAM:  CT ABDOMEN AND PELVIS OC IC                            PROCEDURE DATE:  11/08/2019          INTERPRETATION:  CLINICAL INFORMATION: Right lung mass, for staging.    COMPARISON: Chest CT 11/7/2019, CT abdomen pelvis 6/7/2019.    PROCEDURE:   CT of the Chest, Abdomen and Pelvis was performed with intravenous   contrast.   Intravenous contrast: 90 ml Omnipaque 350. 10 ml discarded.  Oral contrast: Positive contrast was administered.  Sagittal and coronal reformats were performed.    FINDINGS:    CHEST:     LUNGS AND LARGE AIRWAYS: Patent central airways. There is a 4.5 x 5.6 x   7.3 cm right upper lobe mass which exerts mass effect on the SVC and is   inseparable from the mediastinum and pleura extending inferiorly to the   right hilum and superiorly to the right lung apex. There is diffuse   pleural-based mass extending superiorly to the right lung apex. Multiple   pleural-based nodules are also present more inferiorly inseparable from   the mediastinum. There is mild interstitial prominence of the aerated   portion of the right lung. There is compressive atelectasis of the right   lower lobe related to the presence of pleural effusion. Interstitial   prominence peripherally of the left lung.  PLEURA: Moderate right pleural effusion. Diffuse right pleural   involvement as above.  VESSELS: The thoracic aorta and main pulmonary artery are normal in   caliber. The superior vena cava appears somewhat narrowed by compression   from the right lung mass. No central pulmonary embolism.  HEART: Heart size is normal. Pacemaker leads in the right atrium and   right ventricle. No pericardial effusion.  MEDIASTINUM AND SABA: No mediastinal or right hilar lymphadenopathy.   Right upper lobe mass contiguous with right hilum as described above.   There is a 1.4 x 1.2 cm right pericardial lymph node on image #61/series   2.  CHEST WALL AND LOWER NECK: The thyroid gland is within normal limits.   There are no supraclavicular or axillary lymphadenopathy.    ABDOMEN AND PELVIS:    LIVER: Subcentimeter hypodensity in the left hepatic lobe on image   #70/series 2.  2. Small to further characterize.  BILE DUCTS: Normal caliber.  GALLBLADDER: Status postcholecystectomy.  SPLEEN: Within normal limits.  PANCREAS: Within normal limits.  ADRENALS: Within normal limits.  KIDNEYS/URETERS: No renal stones or hydronephrosis. There is a large left   parapelvic cyst with exophytic component measuring 7 x 6 cm.    BLADDER: Within normal limits.  REPRODUCTIVE ORGANS: Prostate within normal limits. There is a penile   prosthesis with reservoir anterosuperior to the urinary bladder.    BOWEL: No bowel obstruction. Appendix is not definitively identified.   There is colonic diverticulosis without diverticulitis.  PERITONEUM: No ascites.  VESSELS: The aorta and IVC are normal in caliber and patent. There is   atherosclerosis of the aorta.  RETROPERITONEUM/LYMPH NODES: No lymphadenopathy.    ABDOMINAL WALL: Within normal limits. Pacemaker generator pack in the   left anterior chest wall with leads through a 6 left subclavian approach.  BONES: Within normal limits.    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 Details are as above.    No evidence for metastatic disease in the abdomen and pelvis.                    ADELA SRINIVASAN M.D. ATTENDING RADIOLOGIST  This document has been electronically signed. Nov 8 2019  4:49PM    EXAM:  CT CHEST                            PROCEDURE DATE:  11/07/2019          INTERPRETATION:  CLINICAL INFORMATION: Fever and chest congestion    COMPARISON: None.    PROCEDURE:   CT of the Chest was performed without intravenous contrast.  Sagittal and coronal reformats were performed.      FINDINGS:    LUNGS AND AIRWAYS AND PLEURA: Patent central airways.  Right apical   nodular mass, likely a pleural-based mass, measuring at least up to 10 cm   in craniocaudal dimension, 8 cm in transverse dimension, and 4.3 cm in AP   dimension which extends along the pleural surface, mediastinal surface,   and up to the right hilum. Nodularity also seen along the surface of the   diaphragm. Bibasilar dependent atelectasis and scattered bands of   platelike atelectasis. Small right pleural effusion.    MEDIASTINUM AND SABA: No significant mediastinal lymphadenopathy.    VESSELS: Normal caliber thoracic aorta and main pulmonary artery.   Atherosclerotic ossifications of the thoracic aorta, great vessels, and   coronary arteries.    HEART: Heart size is normal. No pericardial effusion. Cardiac pacemaker   leads in right atrium and right ventricle.    CHEST WALL AND LOWER NECK: Left chest wall pacemaker.    VISUALIZED UPPER ABDOMEN: Cholecystectomy clips. Incompletely visualized   left renal cyst.    BONES: Degenerative changes of the spine.    IMPRESSION:     Right apical nodular mass presumably a pleural-based mass/malignancy   measuring up to 10 cm (CC) x 8 cm (TX) x 4.3 cm (AP). Nodular mass   extends along the pleural surface, mediastinal surface, and up to the   right hilum. Nodularity also seen along the surface of the right   hemidiaphragm. Small right pleural effusion. Considerations include   mesothelioma, lymphoma, direct extension from bronchogenic carcinoma   which is difficult to appreciate on this unenhanced exam, or   pleural-based metastases.                    JOSIAH RHODES D.O. ATTENDING RADIOLOGIST  This document has been electronically signed. Nov 7 2019  7:58PM Patient is a 89y old  Male who presents with a chief complaint of lung mass (11 Nov 2019 06:24)      HPI:  89yo M, w/ PMH/o heart block s/p PPM ([employer], last interrogated 9/17/2019, implanted 5/2011 serial # [id number] model [id number]), carotid artery disease, h/o TIAs (last episode ~5yrs ago), HTN, HLD, h/o prostate cancer, h/o diverticulitis, gastritis, presenting to the ED at the instruction of PCP Dr. Amado recommending chest CT after outpatient CXR with concerning results.  Pt was in his normal state of health until ~1mo ago when he noticed generalized weakness and feeling unwell. He expresses "getting ready in the AM is a chore." He presented to his PCP who appreciated crackles per pt report and pt was sent for CXR that was negative. At that time, pt was prescribed abx for possible occult PNA. Pt's symptoms were not relieved with abx therapy and was unable to follow up with chest CT for insurance reasons. Now pt c/o fevers of 3-day duration with Tmax at home 100.5. Fevers are responsive to Tylenol but recur. In addition to fevers, pt also c/o frequent gas-like pressure in his chest relieved with belching. Pt presented to his PCP again today for further evaluation of fevers and lack of improvement of described symptoms and completed CXR prior to ED visit. No family h/o lung cancer. Pt previously worked in [employer] industry. Endorses chills and chronic constipation, but otherwise denies unexplainable weight loss, new headaches, vision changes, chest pain, palpitations, cough, SOB, abdominal pain, N/V/D, urinary symptoms, new myalgia/arthralgia.    In the ED  VS T 97.9 /94 HR 92 RR 16 SpO2 95%  Labs WNL.  CT chest: Right apical nodular mass presumably a pleural-based mass/malignancy   measuring up to 10 cm (CC) x 8 cm (TX) x 4.3 cm (AP). Nodular mass   extends along the pleural surface, mediastinal surface, and up to the   right hilum. Nodularity also seen along the surface of the right   hemidiaphragm. Small right pleural effusion. Considerations include mesothelioma, lymphoma, direct extension from bronchogenic carcinoma   which is difficult to appreciate on this unenhanced exam, or pleural-based metastases.  EKG: sinus rhythm with premature supraventricular complexes, LAD, nonspecific intraventricular block, age-undetermined possible lateral and anterior infarct    Received 2.7L NS, 1x DuoNeb. (07 Nov 2019 21:42)    INTERVAL HISTORY:    11/11/19 Pt seen and examined at bedside. Pt returned from thoracocentesis. NPO and holding plavix for bronchoscopy with biopsy scheduled for later day. Pt is in 8/10 pain right lung base and back where thoracocentesis needle was inserted. Pt is having trouble takign full breaths 2/2 pain. Pt denies signs of SVC syndrome including edema of face, swelling of upper torso/ right arm, cyanotic appearance to skin. Pt denies chest pain, palpitations, headache, lightheadedness, shortness of breath, orthopnea, abdominal pain, n/v.     PAST MEDICAL & SURGICAL HISTORY:  Pacemaker: 2007  Diverticulitis  TIA (transient ischemic attack): last TIA over 5 years ago as per patient  Prostate cancer  Carpal tunnel syndrome  HTN (hypertension)  Hyperlipidemia  GERD (gastroesophageal reflux disease)  Heart block  History of cataract surgery: bilateral  S/P cholecystectomy  Pacemaker: [employer] Model # [id number] Serial #[id number]  implant date 05/20/2011            ECHO /14 mild tricuspid and pulm regurgitation diastolic dysfunction, LVEF 54%  FINDINGS:      MEDICATIONS  (STANDING):  atorvastatin 20 milliGRAM(s) Oral at bedtime  metoprolol succinate  milliGRAM(s) Oral daily  pantoprazole    Tablet 40 milliGRAM(s) Oral before breakfast  sodium chloride 0.9%. 1000 milliLiter(s) (30 mL/Hr) IV Continuous <Continuous>    MEDICATIONS  (PRN):  acetaminophen   Tablet .. 1000 milliGRAM(s) Oral every 6 hours PRN Mild Pain (1 - 3)  aluminum hydroxide/magnesium hydroxide/simethicone Suspension 30 milliLiter(s) Oral every 6 hours PRN Dyspepsia      FAMILY HISTORY:\  brothers: polio, esophageal cancer colon cancer   Denies Family history of CAD or early MI      Constitutional: denies fever, chills  HEENT: denies blurry vision, difficulty hearing  Respiratory: +pleuritic CP, denies SOB, GALVAN, cough  Cardiovascular: denies palpitations, orthopnea, PND, LE edema  Gastrointestinal: denies nausea, vomiting, abdominal pain  Genitourinary: denies urinary changes  Skin: Denies rashes, itching  Neurologic: denies headache, weakness, dizziness  Hematology/Oncology: denies bleeding, easy bruising  ROS negative except as noted above      SOCIAL HISTORY:  Former smoker, quit 50yrs ago, smoked 1ppd  No Alcohol or drug use    Vital Signs Last 24 Hrs  T(C): 36.8 (11 Nov 2019 07:25), Max: 37.1 (11 Nov 2019 00:13)  T(F): 98.3 (11 Nov 2019 07:25), Max: 98.7 (11 Nov 2019 00:13)  HR: 93 (11 Nov 2019 07:25) (93 - 109)  BP: 128/78 (11 Nov 2019 07:25) (128/78 - 147/77)  BP(mean): --  RR: 17 (11 Nov 2019 07:25) (17 - 18)  SpO2: 94% (11 Nov 2019 07:25) (91% - 95%)    Physical Exam:  General: Well developed, well nourished, NAD  HEENT: NCAT, PERRLA, EOMI b/l, moist mucous membranes   Neck: Supple, nontender, no mass  Neurology: A&Ox3, nonfocal, sensation intact   Respiratory: lung sounds diminished on right and left lung bases R>L, No W/R/R  CV: RRR, +S1/S2, no murmurs, rubs or gallops  Abdominal: Soft, TTP diffusely 2/2 recent thoracocentesis, ND +BSx4, no palpable masses  Extremities: trace LE edema b/l, No C/C, + peripheral pulses  MSK: Normal ROM, no joint erythema or warmth, no joint swelling   Heme: No obvious ecchymosis or petechiae   Skin: warm, dry, normal color    ECG:  sinus with premature SVCs, vent rate 92 bpm   lateral infarct and inferior infarcts as seen in prior EKGS        Ventricular Rate 92 BPM    Atrial Rate 92 BPM    P-R Interval 182 ms    QRS Duration 154 ms    Q-T Interval 428 ms    QTC Calculation(Bezet) 529 ms    P Axis 42 degrees    R Axis -73 degrees    T Axis 77 degrees    Diagnosis Line Electronic ventricular pacemaker  Confirmed by reid Ojeda ([id number]) on 11/8/2019 3:36:50 PM      I&O's Detail      LABS:                        13.0   9.32  )-----------( 325      ( 11 Nov 2019 07:06 )             40.4     11-11    138  |  104  |  19  ----------------------------<  106<H>  4.4   |  29  |  0.85    Ca    9.4      11 Nov 2019 07:06  Mg     2.2     11-10          PT/INR - ( 10 Nov 2019 06:59 )   PT: 14.5 sec;   INR: 1.27 ratio         PTT - ( 10 Nov 2019 06:59 )  PTT:32.5 sec    I&O's Summary    BNP    RADIOLOGY & ADDITIONAL STUDIES:  EXAM:  CT CHEST OC IC                          EXAM:  CT ABDOMEN AND PELVIS OC IC                            PROCEDURE DATE:  11/08/2019          INTERPRETATION:  CLINICAL INFORMATION: Right lung mass, for staging.    COMPARISON: Chest CT 11/7/2019, CT abdomen pelvis 6/7/2019.    PROCEDURE:   CT of the Chest, Abdomen and Pelvis was performed with intravenous   contrast.   Intravenous contrast: 90 ml Omnipaque 350. 10 ml discarded.  Oral contrast: Positive contrast was administered.  Sagittal and coronal reformats were performed.    FINDINGS:    CHEST:     LUNGS AND LARGE AIRWAYS: Patent central airways. There is a 4.5 x 5.6 x   7.3 cm right upper lobe mass which exerts mass effect on the SVC and is   inseparable from the mediastinum and pleura extending inferiorly to the   right hilum and superiorly to the right lung apex. There is diffuse   pleural-based mass extending superiorly to the right lung apex. Multiple   pleural-based nodules are also present more inferiorly inseparable from   the mediastinum. There is mild interstitial prominence of the aerated   portion of the right lung. There is compressive atelectasis of the right   lower lobe related to the presence of pleural effusion. Interstitial   prominence peripherally of the left lung.  PLEURA: Moderate right pleural effusion. Diffuse right pleural   involvement as above.  VESSELS: The thoracic aorta and main pulmonary artery are normal in   caliber. The superior vena cava appears somewhat narrowed by compression   from the right lung mass. No central pulmonary embolism.  HEART: Heart size is normal. Pacemaker leads in the right atrium and   right ventricle. No pericardial effusion.  MEDIASTINUM AND SABA: No mediastinal or right hilar lymphadenopathy.   Right upper lobe mass contiguous with right hilum as described above.   There is a 1.4 x 1.2 cm right pericardial lymph node on image #61/series   2.  CHEST WALL AND LOWER NECK: The thyroid gland is within normal limits.   There are no supraclavicular or axillary lymphadenopathy.    ABDOMEN AND PELVIS:    LIVER: Subcentimeter hypodensity in the left hepatic lobe on image   #70/series 2.  2. Small to further characterize.  BILE DUCTS: Normal caliber.  GALLBLADDER: Status postcholecystectomy.  SPLEEN: Within normal limits.  PANCREAS: Within normal limits.  ADRENALS: Within normal limits.  KIDNEYS/URETERS: No renal stones or hydronephrosis. There is a large left   parapelvic cyst with exophytic component measuring 7 x 6 cm.    BLADDER: Within normal limits.  REPRODUCTIVE ORGANS: Prostate within normal limits. There is a penile   prosthesis with reservoir anterosuperior to the urinary bladder.    BOWEL: No bowel obstruction. Appendix is not definitively identified.   There is colonic diverticulosis without diverticulitis.  PERITONEUM: No ascites.  VESSELS: The aorta and IVC are normal in caliber and patent. There is   atherosclerosis of the aorta.  RETROPERITONEUM/LYMPH NODES: No lymphadenopathy.    ABDOMINAL WALL: Within normal limits. Pacemaker generator pack in the   left anterior chest wall with leads through a 6 left subclavian approach.  BONES: Within normal limits.    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 Details are as above.    No evidence for metastatic disease in the abdomen and pelvis.                    ADELA SRINIVASAN M.D. ATTENDING RADIOLOGIST  This document has been electronically signed. Nov 8 2019  4:49PM            EXAM:  CT CHEST OC IC                          EXAM:  CT ABDOMEN AND PELVIS OC IC                            PROCEDURE DATE:  11/08/2019          INTERPRETATION:  CLINICAL INFORMATION: Right lung mass, for staging.    COMPARISON: Chest CT 11/7/2019, CT abdomen pelvis 6/7/2019.    PROCEDURE:   CT of the Chest, Abdomen and Pelvis was performed with intravenous   contrast.   Intravenous contrast: 90 ml Omnipaque 350. 10 ml discarded.  Oral contrast: Positive contrast was administered.  Sagittal and coronal reformats were performed.    FINDINGS:    CHEST:     LUNGS AND LARGE AIRWAYS: Patent central airways. There is a 4.5 x 5.6 x   7.3 cm right upper lobe mass which exerts mass effect on the SVC and is   inseparable from the mediastinum and pleura extending inferiorly to the   right hilum and superiorly to the right lung apex. There is diffuse   pleural-based mass extending superiorly to the right lung apex. Multiple   pleural-based nodules are also present more inferiorly inseparable from   the mediastinum. There is mild interstitial prominence of the aerated   portion of the right lung. There is compressive atelectasis of the right   lower lobe related to the presence of pleural effusion. Interstitial   prominence peripherally of the left lung.  PLEURA: Moderate right pleural effusion. Diffuse right pleural   involvement as above.  VESSELS: The thoracic aorta and main pulmonary artery are normal in   caliber. The superior vena cava appears somewhat narrowed by compression   from the right lung mass. No central pulmonary embolism.  HEART: Heart size is normal. Pacemaker leads in the right atrium and   right ventricle. No pericardial effusion.  MEDIASTINUM AND SABA: No mediastinal or right hilar lymphadenopathy.   Right upper lobe mass contiguous with right hilum as described above.   There is a 1.4 x 1.2 cm right pericardial lymph node on image #61/series   2.  CHEST WALL AND LOWER NECK: The thyroid gland is within normal limits.   There are no supraclavicular or axillary lymphadenopathy.    ABDOMEN AND PELVIS:    LIVER: Subcentimeter hypodensity in the left hepatic lobe on image   #70/series 2.  2. Small to further characterize.  BILE DUCTS: Normal caliber.  GALLBLADDER: Status postcholecystectomy.  SPLEEN: Within normal limits.  PANCREAS: Within normal limits.  ADRENALS: Within normal limits.  KIDNEYS/URETERS: No renal stones or hydronephrosis. There is a large left   parapelvic cyst with exophytic component measuring 7 x 6 cm.    BLADDER: Within normal limits.  REPRODUCTIVE ORGANS: Prostate within normal limits. There is a penile   prosthesis with reservoir anterosuperior to the urinary bladder.    BOWEL: No bowel obstruction. Appendix is not definitively identified.   There is colonic diverticulosis without diverticulitis.  PERITONEUM: No ascites.  VESSELS: The aorta and IVC are normal in caliber and patent. There is   atherosclerosis of the aorta.  RETROPERITONEUM/LYMPH NODES: No lymphadenopathy.    ABDOMINAL WALL: Within normal limits. Pacemaker generator pack in the   left anterior chest wall with leads through a 6 left subclavian approach.  BONES: Within normal limits.    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 Details are as above.    No evidence for metastatic disease in the abdomen and pelvis.                    ADELA SRINIVASAN M.D. ATTENDING RADIOLOGIST  This document has been electronically signed. Nov 8 2019  4:49PM    EXAM:  CT CHEST                            PROCEDURE DATE:  11/07/2019          INTERPRETATION:  CLINICAL INFORMATION: Fever and chest congestion    COMPARISON: None.    PROCEDURE:   CT of the Chest was performed without intravenous contrast.  Sagittal and coronal reformats were performed.      FINDINGS:    LUNGS AND AIRWAYS AND PLEURA: Patent central airways.  Right apical   nodular mass, likely a pleural-based mass, measuring at least up to 10 cm   in craniocaudal dimension, 8 cm in transverse dimension, and 4.3 cm in AP   dimension which extends along the pleural surface, mediastinal surface,   and up to the right hilum. Nodularity also seen along the surface of the   diaphragm. Bibasilar dependent atelectasis and scattered bands of   platelike atelectasis. Small right pleural effusion.    MEDIASTINUM AND SABA: No significant mediastinal lymphadenopathy.    VESSELS: Normal caliber thoracic aorta and main pulmonary artery.   Atherosclerotic ossifications of the thoracic aorta, great vessels, and   coronary arteries.    HEART: Heart size is normal. No pericardial effusion. Cardiac pacemaker   leads in right atrium and right ventricle.    CHEST WALL AND LOWER NECK: Left chest wall pacemaker.    VISUALIZED UPPER ABDOMEN: Cholecystectomy clips. Incompletely visualized   left renal cyst.    BONES: Degenerative changes of the spine.    IMPRESSION:     Right apical nodular mass presumably a pleural-based mass/malignancy   measuring up to 10 cm (CC) x 8 cm (TX) x 4.3 cm (AP). Nodular mass   extends along the pleural surface, mediastinal surface, and up to the   right hilum. Nodularity also seen along the surface of the right   hemidiaphragm. Small right pleural effusion. Considerations include   mesothelioma, lymphoma, direct extension from bronchogenic carcinoma   which is difficult to appreciate on this unenhanced exam, or   pleural-based metastases.                    JOSIAH RHODES D.O. ATTENDING RADIOLOGIST  This document has been electronically signed. Nov 7 2019  7:58PM Patient is a 89y old  Male who presents with a chief complaint of lung mass (11 Nov 2019 06:24)      HPI:  89yo M, w/ PMH/o heart block s/p PPM ([employer], last interrogated 9/17/2019, implanted 5/2011 serial # [id number] model [id number]), carotid artery disease, h/o TIAs (last episode ~5yrs ago), HTN, HLD, h/o prostate cancer, h/o diverticulitis, gastritis, presenting to the ED at the instruction of PCP Dr. Amado recommending chest CT after outpatient CXR with concerning results.  Pt was in his normal state of health until ~1mo ago when he noticed generalized weakness and feeling unwell. He expresses "getting ready in the AM is a chore." He presented to his PCP who appreciated crackles per pt report and pt was sent for CXR that was negative. At that time, pt was prescribed abx for possible occult PNA. Pt's symptoms were not relieved with abx therapy and was unable to follow up with chest CT for insurance reasons. Now pt c/o fevers of 3-day duration with Tmax at home 100.5. Fevers are responsive to Tylenol but recur. In addition to fevers, pt also c/o frequent gas-like pressure in his chest relieved with belching. Pt presented to his PCP again today for further evaluation of fevers and lack of improvement of described symptoms and completed CXR prior to ED visit. No family h/o lung cancer. Pt previously worked in [employer] industry. Endorses chills and chronic constipation, but otherwise denies unexplainable weight loss, new headaches, vision changes, chest pain, palpitations, cough, SOB, abdominal pain, N/V/D, urinary symptoms, new myalgia/arthralgia.    In the ED  VS T 97.9 /94 HR 92 RR 16 SpO2 95%  Labs WNL.  CT chest: Right apical nodular mass presumably a pleural-based mass/malignancy   measuring up to 10 cm (CC) x 8 cm (TX) x 4.3 cm (AP). Nodular mass   extends along the pleural surface, mediastinal surface, and up to the   right hilum. Nodularity also seen along the surface of the right   hemidiaphragm. Small right pleural effusion. Considerations include mesothelioma, lymphoma, direct extension from bronchogenic carcinoma   which is difficult to appreciate on this unenhanced exam, or pleural-based metastases.  EKG: sinus rhythm with premature supraventricular complexes, LAD, nonspecific intraventricular block, age-undetermined possible lateral and anterior infarct    Received 2.7L NS, 1x DuoNeb. (07 Nov 2019 21:42)    INTERVAL HISTORY:    11/11/19 Pt seen and examined at bedside. Pt returned from thoracocentesis. NPO and holding plavix for bronchoscopy with biopsy scheduled for later day. Pt is in 8/10 pain right lung base and back where thoracocentesis needle was inserted. Pt is having trouble takign full breaths 2/2 pain. Pt denies signs of SVC syndrome including edema of face, swelling of upper torso/ right arm, cyanotic appearance to skin. Pt denies  palpitations, headache, lightheadedness, shortness of breath, orthopnea, abdominal pain, n/v.     PAST MEDICAL & SURGICAL HISTORY:  Pacemaker: 2007  Diverticulitis  TIA (transient ischemic attack): last TIA over 5 years ago as per patient  Prostate cancer  Carpal tunnel syndrome  HTN (hypertension)  Hyperlipidemia  GERD (gastroesophageal reflux disease)  Heart block  History of cataract surgery: bilateral  S/P cholecystectomy  Pacemaker: [employer] Model # [id number] Serial #[id number]  implant date 05/20/2011            ECHO in 2014 mild tricuspid and pulm regurgitation diastolic dysfunction, LVEF 54%         MEDICATIONS  (STANDING):  atorvastatin 20 milliGRAM(s) Oral at bedtime  metoprolol succinate  milliGRAM(s) Oral daily  pantoprazole    Tablet 40 milliGRAM(s) Oral before breakfast  sodium chloride 0.9%. 1000 milliLiter(s) (30 mL/Hr) IV Continuous <Continuous>    MEDICATIONS  (PRN):  acetaminophen   Tablet .. 1000 milliGRAM(s) Oral every 6 hours PRN Mild Pain (1 - 3)  aluminum hydroxide/magnesium hydroxide/simethicone Suspension 30 milliLiter(s) Oral every 6 hours PRN Dyspepsia      FAMILY HISTORY:\  brothers: polio, esophageal cancer colon cancer   Denies Family history of CAD or early MI      Constitutional: denies fever, chills  HEENT: denies blurry vision, difficulty hearing  Respiratory: +pleuritic CP, denies SOB, GALVAN, cough  Cardiovascular: denies palpitations, orthopnea, PND, LE edema  Gastrointestinal: denies nausea, vomiting, abdominal pain  Genitourinary: denies urinary changes  Skin: Denies rashes, itching  Neurologic: denies headache, weakness, dizziness  Hematology/Oncology: denies bleeding, easy bruising  ROS negative except as noted above      SOCIAL HISTORY:  Former smoker, quit 50yrs ago, smoked 1ppd  No Alcohol or drug use    Vital Signs Last 24 Hrs  T(C): 36.8 (11 Nov 2019 07:25), Max: 37.1 (11 Nov 2019 00:13)  T(F): 98.3 (11 Nov 2019 07:25), Max: 98.7 (11 Nov 2019 00:13)  HR: 93 (11 Nov 2019 07:25) (93 - 109)  BP: 128/78 (11 Nov 2019 07:25) (128/78 - 147/77)  BP(mean): --  RR: 17 (11 Nov 2019 07:25) (17 - 18)  SpO2: 94% (11 Nov 2019 07:25) (91% - 95%)    Physical Exam:  General: Well developed, well nourished, NAD  HEENT: NCAT, PERRLA, EOMI b/l, moist mucous membranes   Neck: Supple, nontender, no mass  Neurology: A&Ox3, nonfocal, sensation intact   Respiratory: lung sounds diminished on right and left lung bases R>L, No W/R/R  CV: RRR, +S1/S2, no murmurs, rubs or gallops  Abdominal: Soft, TTP diffusely 2/2 recent thoracocentesis, ND +BSx4, no palpable masses  Extremities: trace LE edema b/l, No C/C, + peripheral pulses  MSK: Normal ROM, no joint erythema or warmth, no joint swelling   Heme: No obvious ecchymosis or petechiae   Skin: warm, dry, normal color            Ventricular Rate 92 BPM    Atrial Rate 92 BPM    P-R Interval 182 ms    QRS Duration 154 ms    Q-T Interval 428 ms    QTC Calculation(Bezet) 529 ms    P Axis 42 degrees    R Axis -73 degrees    T Axis 77 degrees    Diagnosis Line Electronic ventricular pacemaker  Confirmed by reid Ojeda ([id number]) on 11/8/2019 3:36:50 PM      I&O's Detail      LABS:                        13.0   9.32  )-----------( 325      ( 11 Nov 2019 07:06 )             40.4     11-11    138  |  104  |  19  ----------------------------<  106<H>  4.4   |  29  |  0.85    Ca    9.4      11 Nov 2019 07:06  Mg     2.2     11-10          PT/INR - ( 10 Nov 2019 06:59 )   PT: 14.5 sec;   INR: 1.27 ratio         PTT - ( 10 Nov 2019 06:59 )  PTT:32.5 sec    I&O's Summary    BNP    RADIOLOGY & ADDITIONAL STUDIES:  EXAM:  CT CHEST OC IC                          EXAM:  CT ABDOMEN AND PELVIS OC IC                            PROCEDURE DATE:  11/08/2019          INTERPRETATION:  CLINICAL INFORMATION: Right lung mass, for staging.    COMPARISON: Chest CT 11/7/2019, CT abdomen pelvis 6/7/2019.    PROCEDURE:   CT of the Chest, Abdomen and Pelvis was performed with intravenous   contrast.   Intravenous contrast: 90 ml Omnipaque 350. 10 ml discarded.  Oral contrast: Positive contrast was administered.  Sagittal and coronal reformats were performed.    FINDINGS:    CHEST:     LUNGS AND LARGE AIRWAYS: Patent central airways. There is a 4.5 x 5.6 x   7.3 cm right upper lobe mass which exerts mass effect on the SVC and is   inseparable from the mediastinum and pleura extending inferiorly to the   right hilum and superiorly to the right lung apex. There is diffuse   pleural-based mass extending superiorly to the right lung apex. Multiple   pleural-based nodules are also present more inferiorly inseparable from   the mediastinum. There is mild interstitial prominence of the aerated   portion of the right lung. There is compressive atelectasis of the right   lower lobe related to the presence of pleural effusion. Interstitial   prominence peripherally of the left lung.  PLEURA: Moderate right pleural effusion. Diffuse right pleural   involvement as above.  VESSELS: The thoracic aorta and main pulmonary artery are normal in   caliber. The superior vena cava appears somewhat narrowed by compression   from the right lung mass. No central pulmonary embolism.  HEART: Heart size is normal. Pacemaker leads in the right atrium and   right ventricle. No pericardial effusion.  MEDIASTINUM AND SABA: No mediastinal or right hilar lymphadenopathy.   Right upper lobe mass contiguous with right hilum as described above.   There is a 1.4 x 1.2 cm right pericardial lymph node on image #61/series   2.  CHEST WALL AND LOWER NECK: The thyroid gland is within normal limits.   There are no supraclavicular or axillary lymphadenopathy.    ABDOMEN AND PELVIS:    LIVER: Subcentimeter hypodensity in the left hepatic lobe on image   #70/series 2.  2. Small to further characterize.  BILE DUCTS: Normal caliber.  GALLBLADDER: Status postcholecystectomy.  SPLEEN: Within normal limits.  PANCREAS: Within normal limits.  ADRENALS: Within normal limits.  KIDNEYS/URETERS: No renal stones or hydronephrosis. There is a large left   parapelvic cyst with exophytic component measuring 7 x 6 cm.    BLADDER: Within normal limits.  REPRODUCTIVE ORGANS: Prostate within normal limits. There is a penile   prosthesis with reservoir anterosuperior to the urinary bladder.    BOWEL: No bowel obstruction. Appendix is not definitively identified.   There is colonic diverticulosis without diverticulitis.  PERITONEUM: No ascites.  VESSELS: The aorta and IVC are normal in caliber and patent. There is   atherosclerosis of the aorta.  RETROPERITONEUM/LYMPH NODES: No lymphadenopathy.    ABDOMINAL WALL: Within normal limits. Pacemaker generator pack in the   left anterior chest wall with leads through a 6 left subclavian approach.  BONES: Within normal limits.    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 Details are as above.    No evidence for metastatic disease in the abdomen and pelvis.                    ADELA SRINIVASAN M.D. ATTENDING RADIOLOGIST  This document has been electronically signed. Nov 8 2019  4:49PM            EXAM:  CT CHEST OC IC                          EXAM:  CT ABDOMEN AND PELVIS OC IC                            PROCEDURE DATE:  11/08/2019          INTERPRETATION:  CLINICAL INFORMATION: Right lung mass, for staging.    COMPARISON: Chest CT 11/7/2019, CT abdomen pelvis 6/7/2019.    PROCEDURE:   CT of the Chest, Abdomen and Pelvis was performed with intravenous   contrast.   Intravenous contrast: 90 ml Omnipaque 350. 10 ml discarded.  Oral contrast: Positive contrast was administered.  Sagittal and coronal reformats were performed.    FINDINGS:    CHEST:     LUNGS AND LARGE AIRWAYS: Patent central airways. There is a 4.5 x 5.6 x   7.3 cm right upper lobe mass which exerts mass effect on the SVC and is   inseparable from the mediastinum and pleura extending inferiorly to the   right hilum and superiorly to the right lung apex. There is diffuse   pleural-based mass extending superiorly to the right lung apex. Multiple   pleural-based nodules are also present more inferiorly inseparable from   the mediastinum. There is mild interstitial prominence of the aerated   portion of the right lung. There is compressive atelectasis of the right   lower lobe related to the presence of pleural effusion. Interstitial   prominence peripherally of the left lung.  PLEURA: Moderate right pleural effusion. Diffuse right pleural   involvement as above.  VESSELS: The thoracic aorta and main pulmonary artery are normal in   caliber. The superior vena cava appears somewhat narrowed by compression   from the right lung mass. No central pulmonary embolism.  HEART: Heart size is normal. Pacemaker leads in the right atrium and   right ventricle. No pericardial effusion.  MEDIASTINUM AND SABA: No mediastinal or right hilar lymphadenopathy.   Right upper lobe mass contiguous with right hilum as described above.   There is a 1.4 x 1.2 cm right pericardial lymph node on image #61/series   2.  CHEST WALL AND LOWER NECK: The thyroid gland is within normal limits.   There are no supraclavicular or axillary lymphadenopathy.    ABDOMEN AND PELVIS:    LIVER: Subcentimeter hypodensity in the left hepatic lobe on image   #70/series 2.  2. Small to further characterize.  BILE DUCTS: Normal caliber.  GALLBLADDER: Status postcholecystectomy.  SPLEEN: Within normal limits.  PANCREAS: Within normal limits.  ADRENALS: Within normal limits.  KIDNEYS/URETERS: No renal stones or hydronephrosis. There is a large left   parapelvic cyst with exophytic component measuring 7 x 6 cm.    BLADDER: Within normal limits.  REPRODUCTIVE ORGANS: Prostate within normal limits. There is a penile   prosthesis with reservoir anterosuperior to the urinary bladder.    BOWEL: No bowel obstruction. Appendix is not definitively identified.   There is colonic diverticulosis without diverticulitis.  PERITONEUM: No ascites.  VESSELS: The aorta and IVC are normal in caliber and patent. There is   atherosclerosis of the aorta.  RETROPERITONEUM/LYMPH NODES: No lymphadenopathy.    ABDOMINAL WALL: Within normal limits. Pacemaker generator pack in the   left anterior chest wall with leads through a 6 left subclavian approach.  BONES: Within normal limits.    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 Details are as above.    No evidence for metastatic disease in the abdomen and pelvis.                    ADELA SRINIVASAN M.D. ATTENDING RADIOLOGIST  This document has been electronically signed. Nov 8 2019  4:49PM    EXAM:  CT CHEST                            PROCEDURE DATE:  11/07/2019          INTERPRETATION:  CLINICAL INFORMATION: Fever and chest congestion    COMPARISON: None.    PROCEDURE:   CT of the Chest was performed without intravenous contrast.  Sagittal and coronal reformats were performed.      FINDINGS:    LUNGS AND AIRWAYS AND PLEURA: Patent central airways.  Right apical   nodular mass, likely a pleural-based mass, measuring at least up to 10 cm   in craniocaudal dimension, 8 cm in transverse dimension, and 4.3 cm in AP   dimension which extends along the pleural surface, mediastinal surface,   and up to the right hilum. Nodularity also seen along the surface of the   diaphragm. Bibasilar dependent atelectasis and scattered bands of   platelike atelectasis. Small right pleural effusion.    MEDIASTINUM AND SABA: No significant mediastinal lymphadenopathy.    VESSELS: Normal caliber thoracic aorta and main pulmonary artery.   Atherosclerotic ossifications of the thoracic aorta, great vessels, and   coronary arteries.    HEART: Heart size is normal. No pericardial effusion. Cardiac pacemaker   leads in right atrium and right ventricle.    CHEST WALL AND LOWER NECK: Left chest wall pacemaker.    VISUALIZED UPPER ABDOMEN: Cholecystectomy clips. Incompletely visualized   left renal cyst.    BONES: Degenerative changes of the spine.    IMPRESSION:     Right apical nodular mass presumably a pleural-based mass/malignancy   measuring up to 10 cm (CC) x 8 cm (TX) x 4.3 cm (AP). Nodular mass   extends along the pleural surface, mediastinal surface, and up to the   right hilum. Nodularity also seen along the surface of the right   hemidiaphragm. Small right pleural effusion. Considerations include   mesothelioma, lymphoma, direct extension from bronchogenic carcinoma   which is difficult to appreciate on this unenhanced exam, or   pleural-based metastases.                    JOSIAH RHODES D.O. ATTENDING RADIOLOGIST  This document has been electronically signed. Nov 7 2019  7:58PM

## 2019-11-12 ENCOUNTER — TRANSCRIPTION ENCOUNTER (OUTPATIENT)
Age: 84
End: 2019-11-12

## 2019-11-12 VITALS — HEART RATE: 94 BPM | OXYGEN SATURATION: 91 %

## 2019-11-12 LAB
ANION GAP SERPL CALC-SCNC: 6 MMOL/L — SIGNIFICANT CHANGE UP (ref 5–17)
BUN SERPL-MCNC: 19 MG/DL — SIGNIFICANT CHANGE UP (ref 7–23)
CALCIUM SERPL-MCNC: 9.6 MG/DL — SIGNIFICANT CHANGE UP (ref 8.5–10.1)
CHLORIDE SERPL-SCNC: 103 MMOL/L — SIGNIFICANT CHANGE UP (ref 96–108)
CO2 SERPL-SCNC: 29 MMOL/L — SIGNIFICANT CHANGE UP (ref 22–31)
CREAT SERPL-MCNC: 0.84 MG/DL — SIGNIFICANT CHANGE UP (ref 0.5–1.3)
CULTURE RESULTS: SIGNIFICANT CHANGE UP
CULTURE RESULTS: SIGNIFICANT CHANGE UP
GLUCOSE SERPL-MCNC: 94 MG/DL — SIGNIFICANT CHANGE UP (ref 70–99)
HCT VFR BLD CALC: 41.5 % — SIGNIFICANT CHANGE UP (ref 39–50)
HGB BLD-MCNC: 13.1 G/DL — SIGNIFICANT CHANGE UP (ref 13–17)
MAGNESIUM SERPL-MCNC: 2.3 MG/DL — SIGNIFICANT CHANGE UP (ref 1.6–2.6)
MCHC RBC-ENTMCNC: 27.4 PG — SIGNIFICANT CHANGE UP (ref 27–34)
MCHC RBC-ENTMCNC: 31.6 GM/DL — LOW (ref 32–36)
MCV RBC AUTO: 86.8 FL — SIGNIFICANT CHANGE UP (ref 80–100)
NIGHT BLUE STAIN TISS: SIGNIFICANT CHANGE UP
NIGHT BLUE STAIN TISS: SIGNIFICANT CHANGE UP
NRBC # BLD: 0 /100 WBCS — SIGNIFICANT CHANGE UP (ref 0–0)
PLATELET # BLD AUTO: 317 K/UL — SIGNIFICANT CHANGE UP (ref 150–400)
POTASSIUM SERPL-MCNC: 4.5 MMOL/L — SIGNIFICANT CHANGE UP (ref 3.5–5.3)
POTASSIUM SERPL-SCNC: 4.5 MMOL/L — SIGNIFICANT CHANGE UP (ref 3.5–5.3)
RBC # BLD: 4.78 M/UL — SIGNIFICANT CHANGE UP (ref 4.2–5.8)
RBC # FLD: 12.6 % — SIGNIFICANT CHANGE UP (ref 10.3–14.5)
SODIUM SERPL-SCNC: 138 MMOL/L — SIGNIFICANT CHANGE UP (ref 135–145)
SPECIMEN SOURCE: SIGNIFICANT CHANGE UP
WBC # BLD: 9.12 K/UL — SIGNIFICANT CHANGE UP (ref 3.8–10.5)
WBC # FLD AUTO: 9.12 K/UL — SIGNIFICANT CHANGE UP (ref 3.8–10.5)

## 2019-11-12 PROCEDURE — 71260 CT THORAX DX C+: CPT

## 2019-11-12 PROCEDURE — 85730 THROMBOPLASTIN TIME PARTIAL: CPT

## 2019-11-12 PROCEDURE — 83735 ASSAY OF MAGNESIUM: CPT

## 2019-11-12 PROCEDURE — 88305 TISSUE EXAM BY PATHOLOGIST: CPT

## 2019-11-12 PROCEDURE — 94640 AIRWAY INHALATION TREATMENT: CPT

## 2019-11-12 PROCEDURE — 36415 COLL VENOUS BLD VENIPUNCTURE: CPT

## 2019-11-12 PROCEDURE — 87040 BLOOD CULTURE FOR BACTERIA: CPT

## 2019-11-12 PROCEDURE — 81001 URINALYSIS AUTO W/SCOPE: CPT

## 2019-11-12 PROCEDURE — 88108 CYTOPATH CONCENTRATE TECH: CPT

## 2019-11-12 PROCEDURE — 82042 OTHER SOURCE ALBUMIN QUAN EA: CPT

## 2019-11-12 PROCEDURE — 87206 SMEAR FLUORESCENT/ACID STAI: CPT

## 2019-11-12 PROCEDURE — 84157 ASSAY OF PROTEIN OTHER: CPT

## 2019-11-12 PROCEDURE — 82378 CARCINOEMBRYONIC ANTIGEN: CPT

## 2019-11-12 PROCEDURE — 32555 ASPIRATE PLEURA W/ IMAGING: CPT

## 2019-11-12 PROCEDURE — 97161 PT EVAL LOW COMPLEX 20 MIN: CPT

## 2019-11-12 PROCEDURE — 82550 ASSAY OF CK (CPK): CPT

## 2019-11-12 PROCEDURE — 83605 ASSAY OF LACTIC ACID: CPT

## 2019-11-12 PROCEDURE — 83519 RIA NONANTIBODY: CPT

## 2019-11-12 PROCEDURE — 99232 SBSQ HOSP IP/OBS MODERATE 35: CPT

## 2019-11-12 PROCEDURE — 80048 BASIC METABOLIC PNL TOTAL CA: CPT

## 2019-11-12 PROCEDURE — 83986 ASSAY PH BODY FLUID NOS: CPT

## 2019-11-12 PROCEDURE — 84484 ASSAY OF TROPONIN QUANT: CPT

## 2019-11-12 PROCEDURE — 85027 COMPLETE CBC AUTOMATED: CPT

## 2019-11-12 PROCEDURE — 87015 SPECIMEN INFECT AGNT CONCNTJ: CPT

## 2019-11-12 PROCEDURE — 89051 BODY FLUID CELL COUNT: CPT

## 2019-11-12 PROCEDURE — 82945 GLUCOSE OTHER FLUID: CPT

## 2019-11-12 PROCEDURE — 71250 CT THORAX DX C-: CPT

## 2019-11-12 PROCEDURE — 71045 X-RAY EXAM CHEST 1 VIEW: CPT

## 2019-11-12 PROCEDURE — 85610 PROTHROMBIN TIME: CPT

## 2019-11-12 PROCEDURE — 87116 MYCOBACTERIA CULTURE: CPT

## 2019-11-12 PROCEDURE — 99239 HOSP IP/OBS DSCHRG MGMT >30: CPT

## 2019-11-12 PROCEDURE — 87075 CULTR BACTERIA EXCEPT BLOOD: CPT

## 2019-11-12 PROCEDURE — 87070 CULTURE OTHR SPECIMN AEROBIC: CPT

## 2019-11-12 PROCEDURE — 99285 EMERGENCY DEPT VISIT HI MDM: CPT | Mod: 25

## 2019-11-12 PROCEDURE — 87102 FUNGUS ISOLATION CULTURE: CPT

## 2019-11-12 PROCEDURE — 74177 CT ABD & PELVIS W/CONTRAST: CPT

## 2019-11-12 PROCEDURE — 87205 SMEAR GRAM STAIN: CPT

## 2019-11-12 PROCEDURE — 80053 COMPREHEN METABOLIC PANEL: CPT

## 2019-11-12 PROCEDURE — C1729: CPT

## 2019-11-12 PROCEDURE — 83615 LACTATE (LD) (LDH) ENZYME: CPT

## 2019-11-12 PROCEDURE — 82803 BLOOD GASES ANY COMBINATION: CPT

## 2019-11-12 PROCEDURE — 93005 ELECTROCARDIOGRAM TRACING: CPT

## 2019-11-12 PROCEDURE — 82553 CREATINE MB FRACTION: CPT

## 2019-11-12 RX ORDER — METOPROLOL TARTRATE 50 MG
1 TABLET ORAL
Qty: 30 | Refills: 0
Start: 2019-11-12

## 2019-11-12 RX ORDER — ACETAMINOPHEN 500 MG
2 TABLET ORAL
Qty: 0 | Refills: 0 | DISCHARGE
Start: 2019-11-12

## 2019-11-12 RX ADMIN — Medication 1000 MILLIGRAM(S): at 06:31

## 2019-11-12 RX ADMIN — Medication 1000 MILLIGRAM(S): at 07:01

## 2019-11-12 RX ADMIN — Medication 1000 MILLIGRAM(S): at 01:01

## 2019-11-12 RX ADMIN — Medication 1000 MILLIGRAM(S): at 12:32

## 2019-11-12 RX ADMIN — Medication 1000 MILLIGRAM(S): at 00:31

## 2019-11-12 RX ADMIN — Medication 100 MILLIGRAM(S): at 05:32

## 2019-11-12 RX ADMIN — PANTOPRAZOLE SODIUM 40 MILLIGRAM(S): 20 TABLET, DELAYED RELEASE ORAL at 05:32

## 2019-11-12 NOTE — PROGRESS NOTE ADULT - PROBLEM SELECTOR PLAN 1
lung mass and pleural eff  s/p 1850 cc drained - exudate - path pending  s/p Bronchoscopy - mucus plugging and atelectasis - no endobronchial lesion  pt may be dc home today from [facility] - will notify pt and family of Path and Micro report - which will likely be available Wed.   discussed with daughter  pt is on room air   will need outpatient follow up - discussed with daughter - Onc eval - ct chest repeat and or PET scan -

## 2019-11-12 NOTE — PROGRESS NOTE ADULT - SUBJECTIVE AND OBJECTIVE BOX
[facility] -- Olman Vela, Mike Thakkar, Juancarlos Murphy [place], [place]  Office # [phone]    Follow Up: Pleural effusion, optimization for bronchoscopy    Subjective/Observations:     REVIEW OF SYSTEMS: All other review of systems is negative unless indicated above  PAST MEDICAL & SURGICAL HISTORY:  Pacemaker: 2007  Diverticulitis  TIA (transient ischemic attack): last TIA over 5 years ago as per patient  Prostate cancer  Carpal tunnel syndrome  HTN (hypertension)  Hyperlipidemia  GERD (gastroesophageal reflux disease)  Heart block  History of cataract surgery: bilateral  S/P cholecystectomy  Pacemaker: [employer] Model # [id number] Serial #[id number]  implant date 05/20/2011    MEDICATIONS  (STANDING):  atorvastatin 20 milliGRAM(s) Oral at bedtime  melatonin 3 milliGRAM(s) Oral at bedtime  metoprolol succinate  milliGRAM(s) Oral daily  pantoprazole    Tablet 40 milliGRAM(s) Oral before breakfast  sodium chloride 0.9%. 1000 milliLiter(s) (30 mL/Hr) IV Continuous <Continuous>    MEDICATIONS  (PRN):  acetaminophen   Tablet .. 1000 milliGRAM(s) Oral every 6 hours PRN Mild Pain (1 - 3)  aluminum hydroxide/magnesium hydroxide/simethicone Suspension 30 milliLiter(s) Oral every 6 hours PRN Dyspepsia    Allergies    No Known Allergies    Intolerances      Vital Signs Last 24 Hrs  T(C): 37.3 (12 Nov 2019 07:34), Max: 37.3 (12 Nov 2019 07:34)  T(F): 99.1 (12 Nov 2019 07:34), Max: 99.1 (12 Nov 2019 07:34)  HR: 94 (12 Nov 2019 10:41) (82 - 104)  BP: 111/67 (12 Nov 2019 07:34) (111/67 - 137/76)  BP(mean): 77 (12 Nov 2019 05:29) (77 - 77)  RR: 17 (12 Nov 2019 07:34) (12 - 19)  SpO2: 91% (12 Nov 2019 10:41) (90% - 96%)  I&O's Summary    11 Nov 2019 07:01  -  12 Nov 2019 07:00  --------------------------------------------------------  IN: 240 mL / OUT: 200 mL / NET: 40 mL        PHYSICAL EXAM:  TELE:   Constitutional: NAD, awake and alert, well-developed  HEENT: Moist Mucous Membranes, Anicteric  Pulmonary: Non-labored, breath sounds are clear bilaterally, No wheezing, rales or rhonchi  Cardiovascular: Regular, S1 and S2, No murmurs, rubs, gallops or clicks  Gastrointestinal: Bowel Sounds present, soft, nontender.   Lymph: No peripheral edema. No lymphadenopathy.  Skin: No visible rashes or ulcers.  Psych:  Mood & affect appropriate  LABS: All Labs Reviewed:                        13.1   9.12  )-----------( 317      ( 12 Nov 2019 06:48 )             41.5                         13.0   9.32  )-----------( 325      ( 11 Nov 2019 07:06 )             40.4                         12.9   8.86  )-----------( 299      ( 10 Nov 2019 06:59 )             39.7     12 Nov 2019 06:48    138    |  103    |  19     ----------------------------<  94     4.5     |  29     |  0.84   11 Nov 2019 07:06    138    |  104    |  19     ----------------------------<  106    4.4     |  29     |  0.85   10 Nov 2019 06:59    137    |  104    |  17     ----------------------------<  98     4.2     |  29     |  0.75     Ca    9.6        12 Nov 2019 06:48  Ca    9.4        11 Nov 2019 07:06  Ca    9.6        10 Nov 2019 06:59  Mg     2.3       12 Nov 2019 06:48  Mg     2.2       10 Nov 2019 06:59    < from: CT Chest w/ Oral Cont and w/ IV Cont (11.08.19 @ 16:31) >    EXAM:  CT CHEST OC IC                          EXAM:  CT ABDOMEN AND PELVIS OC IC                            PROCEDURE DATE:  11/08/2019          INTERPRETATION:  CLINICAL INFORMATION: Right lung mass, for staging.    COMPARISON: Chest CT 11/7/2019, CT abdomen pelvis 6/7/2019.    PROCEDURE:   CT of the Chest, Abdomen and Pelvis was performed with intravenous   contrast.   Intravenous contrast: 90 ml Omnipaque 350. 10 ml discarded.  Oral contrast: Positive contrast was administered.  Sagittal and coronal reformats were performed.    FINDINGS:    CHEST:     LUNGS AND LARGE AIRWAYS: Patent central airways. There is a 4.5 x 5.6 x   7.3 cm right upper lobe mass which exerts mass effect on the SVC and is   inseparable from the mediastinum and pleura extending inferiorly to the   right hilum and superiorly to the right lung apex. There is diffuse   pleural-based mass extending superiorly to the right lung apex. Multiple   pleural-based nodules are also present more inferiorly inseparable from   the mediastinum. There is mild interstitial prominence of the aerated   portion of the right lung. There is compressive atelectasis of the right   lower lobe related to the presence of pleural effusion. Interstitial   prominence peripherally of the left lung.  PLEURA: Moderate right pleural effusion. Diffuse right pleural   involvement as above.  VESSELS: The thoracic aorta and main pulmonary artery are normal in   caliber. The superior vena cava appears somewhat narrowed by compression   from the right lung mass. No central pulmonary embolism.  HEART: Heart size is normal. Pacemaker leads in the right atrium and   right ventricle. No pericardial effusion.  MEDIASTINUM AND SABA: No mediastinal or right hilar lymphadenopathy.   Right upper lobe mass contiguous with right hilum as described above.   There is a 1.4 x 1.2 cm right pericardial lymph node on image #61/series   2.  CHEST WALL AND LOWER NECK: The thyroid gland is within normal limits.   There are no supraclavicular or axillary lymphadenopathy.    ABDOMEN AND PELVIS:    LIVER: Subcentimeter hypodensity in the left hepatic lobe on image   #70/series 2.  2. Small to further characterize.  BILE DUCTS: Normal caliber.  GALLBLADDER: Status postcholecystectomy.  SPLEEN: Within normal limits.  PANCREAS: Within normal limits.  ADRENALS: Within normal limits.  KIDNEYS/URETERS: No renal stones or hydronephrosis. There is a large left   parapelvic cyst with exophytic component measuring 7 x 6 cm.    BLADDER: Within normal limits.  REPRODUCTIVE ORGANS: Prostate within normal limits. There is a penile   prosthesis with reservoir anterosuperior to the urinary bladder.    BOWEL: No bowel obstruction. Appendix is not definitively identified.   There is colonic diverticulosis without diverticulitis.  PERITONEUM: No ascites.  VESSELS: The aorta and IVC are normal in caliber and patent. There is   atherosclerosis of the aorta.  RETROPERITONEUM/LYMPH NODES: No lymphadenopathy.    ABDOMINAL WALL: Within normal limits. Pacemaker generator pack in the   left anterior chest wall with leads through a 6 left subclavian approach.  BONES: Within normal limits.    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 Details are as above.    No evidence for metastatic disease in the abdomen and pelvis.    ADELA SRINIVASAN M.D. ATTENDING RADIOLOGIST  This document has been electronically signed. Nov 8 2019  4:49PM      < end of copied text >    < from: US Guided Thoracentesis w/Imaging, Right (11.11.19 @ 11:22) >    EXAM:  US THORACENTESIS RT [facility]                            PROCEDURE DATE:  11/11/2019          INTERPRETATION:  HISTORY: Pleural effusion.    Clinical Information: Patient with right pleural effusion here for   thoracentesis.     : Dr. Espinoza.    Anesthesia: 2% Lidocaine local.    COMPLICATIONS: None.    TECHNIQUE: The procedure including risks and benefits was discussed with   the patient. Informed written consent was obtained and placed in the   patient's chart.    The patient's brought into the angiography suite and placed on the table   in the upright position. Ultrasonographic evaluation of the posterior   thorax demonstrated a moderate right pleural effusion. The right   posterior thorax was prepped and draped in normal sterile fashion. 2%   Lidocaine was infiltrated for local anesthesia. Using ultrasound   guidance, a 5 [place] Yueh needle was advanced into the right posterior   pleural space. Positioning was confirmed with aspiration of clear yellow   fluid. 1885 cc of pleural fluid were removed. Samples were collected for   microbiology, cytology, and chemistry as per the ordering physician's   request. The needle was then removed a sterile dressing was placed. The   patient tolerated the procedure without difficulty was discharged from   the department in stable condition.     IMPRESSION: Successful ultrasound-guided right thoracentesis as described   above.    YONATAN ESPINOZA M.D., ATTENDING RADIOLOGIST  This document has been electronically signed. Nov 11 2019 11:26AM     < end of copied text >    < from: 12 Lead ECG (11.07.19 @ 18:35) >    Ventricular Rate 92 BPM    Atrial Rate 92 BPM    P-R Interval 182 ms    QRS Duration 154 ms    Q-T Interval 428 ms    QTC Calculation(Bezet) 529 ms    P Axis 42 degrees    R Axis -73 degrees    T Axis 77 degrees    Diagnosis Line Electronic ventricular pacemaker  Confirmed by reid Ojeda ([id number]) on 11/8/2019 3:36:50 PM    < end of copied text >    Celeste Jeffery [facility]  Cardiology   Spectra #[phone] [facility] -- Olman Vela, Mike Thakkar, Juancarlos Murphy [place], [place]  Office # [phone]    Follow Up: Pleural effusion, optimization for bronchoscopy    Subjective/Observations: Sitting on the chair, feeling better, however, still c/o non-productive cough.  On RA.  Denies any cardiac discomfort.  Awaiting discharge    REVIEW OF SYSTEMS: All other review of systems is negative unless indicated above  PAST MEDICAL & SURGICAL HISTORY:  Pacemaker: 2007  Diverticulitis  TIA (transient ischemic attack): last TIA over 5 years ago as per patient  Prostate cancer  Carpal tunnel syndrome  HTN (hypertension)  Hyperlipidemia  GERD (gastroesophageal reflux disease)  Heart block  History of cataract surgery: bilateral  S/P cholecystectomy  Pacemaker: [employer] Model # [id number] Serial #[id number]  implant date 05/20/2011    MEDICATIONS  (STANDING):  atorvastatin 20 milliGRAM(s) Oral at bedtime  melatonin 3 milliGRAM(s) Oral at bedtime  metoprolol succinate  milliGRAM(s) Oral daily  pantoprazole    Tablet 40 milliGRAM(s) Oral before breakfast  sodium chloride 0.9%. 1000 milliLiter(s) (30 mL/Hr) IV Continuous <Continuous>    MEDICATIONS  (PRN):  acetaminophen   Tablet .. 1000 milliGRAM(s) Oral every 6 hours PRN Mild Pain (1 - 3)  aluminum hydroxide/magnesium hydroxide/simethicone Suspension 30 milliLiter(s) Oral every 6 hours PRN Dyspepsia    Allergies    No Known Allergies    Intolerances    Vital Signs Last 24 Hrs  T(C): 37.3 (12 Nov 2019 07:34), Max: 37.3 (12 Nov 2019 07:34)  T(F): 99.1 (12 Nov 2019 07:34), Max: 99.1 (12 Nov 2019 07:34)  HR: 94 (12 Nov 2019 10:41) (82 - 104)  BP: 111/67 (12 Nov 2019 07:34) (111/67 - 137/76)  BP(mean): 77 (12 Nov 2019 05:29) (77 - 77)  RR: 17 (12 Nov 2019 07:34) (12 - 19)  SpO2: 91% (12 Nov 2019 10:41) (90% - 96%)  I&O's Summary    11 Nov 2019 07:01  -  12 Nov 2019 07:00  --------------------------------------------------------  IN: 240 mL / OUT: 200 mL / NET: 40 mL    PHYSICAL EXAM:  TELE: Not on tele  Constitutional: NAD, awake and alert, obese  HEENT: Moist Mucous Membranes, Anicteric  Pulmonary: Non-labored, breath sounds are diminished bilaterally, No wheezing, + rales and rhonchi  right base  Cardiovascular: Regular, S1 and S2, No murmurs, rubs, gallops or clicks  Gastrointestinal: Bowel Sounds present, soft, nontender.   Lymph: No peripheral edema. No lymphadenopathy.  Skin: No visible rashes or ulcers.  Pale in color.  Dry skin  Psych:  Mood & affect appropriate  LABS: All Labs Reviewed:                        13.1   9.12  )-----------( 317      ( 12 Nov 2019 06:48 )             41.5                         13.0   9.32  )-----------( 325      ( 11 Nov 2019 07:06 )             40.4                         12.9   8.86  )-----------( 299      ( 10 Nov 2019 06:59 )             39.7     12 Nov 2019 06:48    138    |  103    |  19     ----------------------------<  94     4.5     |  29     |  0.84   11 Nov 2019 07:06    138    |  104    |  19     ----------------------------<  106    4.4     |  29     |  0.85   10 Nov 2019 06:59    137    |  104    |  17     ----------------------------<  98     4.2     |  29     |  0.75     Ca    9.6        12 Nov 2019 06:48  Ca    9.4        11 Nov 2019 07:06  Ca    9.6        10 Nov 2019 06:59  Mg     2.3       12 Nov 2019 06:48  Mg     2.2       10 Nov 2019 06:59    < from: CT Chest w/ Oral Cont and w/ IV Cont (11.08.19 @ 16:31) >    EXAM:  CT CHEST OC IC                          EXAM:  CT ABDOMEN AND PELVIS OC IC                            PROCEDURE DATE:  11/08/2019          INTERPRETATION:  CLINICAL INFORMATION: Right lung mass, for staging.    COMPARISON: Chest CT 11/7/2019, CT abdomen pelvis 6/7/2019.    PROCEDURE:   CT of the Chest, Abdomen and Pelvis was performed with intravenous   contrast.   Intravenous contrast: 90 ml Omnipaque 350. 10 ml discarded.  Oral contrast: Positive contrast was administered.  Sagittal and coronal reformats were performed.    FINDINGS:    CHEST:     LUNGS AND LARGE AIRWAYS: Patent central airways. There is a 4.5 x 5.6 x   7.3 cm right upper lobe mass which exerts mass effect on the SVC and is   inseparable from the mediastinum and pleura extending inferiorly to the   right hilum and superiorly to the right lung apex. There is diffuse   pleural-based mass extending superiorly to the right lung apex. Multiple   pleural-based nodules are also present more inferiorly inseparable from   the mediastinum. There is mild interstitial prominence of the aerated   portion of the right lung. There is compressive atelectasis of the right   lower lobe related to the presence of pleural effusion. Interstitial   prominence peripherally of the left lung.  PLEURA: Moderate right pleural effusion. Diffuse right pleural   involvement as above.  VESSELS: The thoracic aorta and main pulmonary artery are normal in   caliber. The superior vena cava appears somewhat narrowed by compression   from the right lung mass. No central pulmonary embolism.  HEART: Heart size is normal. Pacemaker leads in the right atrium and   right ventricle. No pericardial effusion.  MEDIASTINUM AND SABA: No mediastinal or right hilar lymphadenopathy.   Right upper lobe mass contiguous with right hilum as described above.   There is a 1.4 x 1.2 cm right pericardial lymph node on image #61/series   2.  CHEST WALL AND LOWER NECK: The thyroid gland is within normal limits.   There are no supraclavicular or axillary lymphadenopathy.    ABDOMEN AND PELVIS:    LIVER: Subcentimeter hypodensity in the left hepatic lobe on image   #70/series 2.  2. Small to further characterize.  BILE DUCTS: Normal caliber.  GALLBLADDER: Status postcholecystectomy.  SPLEEN: Within normal limits.  PANCREAS: Within normal limits.  ADRENALS: Within normal limits.  KIDNEYS/URETERS: No renal stones or hydronephrosis. There is a large left   parapelvic cyst with exophytic component measuring 7 x 6 cm.    BLADDER: Within normal limits.  REPRODUCTIVE ORGANS: Prostate within normal limits. There is a penile   prosthesis with reservoir anterosuperior to the urinary bladder.    BOWEL: No bowel obstruction. Appendix is not definitively identified.   There is colonic diverticulosis without diverticulitis.  PERITONEUM: No ascites.  VESSELS: The aorta and IVC are normal in caliber and patent. There is   atherosclerosis of the aorta.  RETROPERITONEUM/LYMPH NODES: No lymphadenopathy.    ABDOMINAL WALL: Within normal limits. Pacemaker generator pack in the   left anterior chest wall with leads through a 6 left subclavian approach.  BONES: Within normal limits.    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 Details are as above.    No evidence for metastatic disease in the abdomen and pelvis.    ADELA SRINIVASAN M.D. ATTENDING RADIOLOGIST  This document has been electronically signed. Nov 8 2019  4:49PM      < end of copied text >    < from: US Guided Thoracentesis w/Imaging, Right (11.11.19 @ 11:22) >    EXAM:  US THORACENTESIS RT [facility]                            PROCEDURE DATE:  11/11/2019          INTERPRETATION:  HISTORY: Pleural effusion.    Clinical Information: Patient with right pleural effusion here for   thoracentesis.     : Dr. Espinoza.    Anesthesia: 2% Lidocaine local.    COMPLICATIONS: None.    TECHNIQUE: The procedure including risks and benefits was discussed with   the patient. Informed written consent was obtained and placed in the   patient's chart.    The patient's brought into the angiography suite and placed on the table   in the upright position. Ultrasonographic evaluation of the posterior   thorax demonstrated a moderate right pleural effusion. The right   posterior thorax was prepped and draped in normal sterile fashion. 2%   Lidocaine was infiltrated for local anesthesia. Using ultrasound   guidance, a 5 [place] Yueh needle was advanced into the right posterior   pleural space. Positioning was confirmed with aspiration of clear yellow   fluid. 1885 cc of pleural fluid were removed. Samples were collected for   microbiology, cytology, and chemistry as per the ordering physician's   request. The needle was then removed a sterile dressing was placed. The   patient tolerated the procedure without difficulty was discharged from   the department in stable condition.     IMPRESSION: Successful ultrasound-guided right thoracentesis as described   above.    YONATAN ESPINOZA M.D., ATTENDING RADIOLOGIST  This document has been electronically signed. Nov 11 2019 11:26AM     < end of copied text >    < from: 12 Lead ECG (11.07.19 @ 18:35) >    Ventricular Rate 92 BPM    Atrial Rate 92 BPM    P-R Interval 182 ms    QRS Duration 154 ms    Q-T Interval 428 ms    QTC Calculation(Bezet) 529 ms    P Axis 42 degrees    R Axis -73 degrees    T Axis 77 degrees    Diagnosis Line Electronic ventricular pacemaker  Confirmed by reid Ojeda ([id number]) on 11/8/2019 3:36:50 PM    < end of copied text >

## 2019-11-12 NOTE — PROGRESS NOTE ADULT - SUBJECTIVE AND OBJECTIVE BOX
Date/Time Patient Seen:  		  Referring MD:   Data Reviewed	       Patient is a 89y old  Male who presents with a chief complaint of lung mass (11 Nov 2019 11:55)      Subjective/HPI     PAST MEDICAL & SURGICAL HISTORY:  Pacemaker: 2007  Diverticulitis  TIA (transient ischemic attack): last TIA over 5 years ago as per patient  Prostate cancer  Carpal tunnel syndrome  HTN (hypertension)  Hyperlipidemia  GERD (gastroesophageal reflux disease)  Heart block  History of cataract surgery: bilateral  S/P cholecystectomy  Pacemaker: [employer] Model # [id number] Serial #[id number]  implant date 05/20/2011        Medication list         MEDICATIONS  (STANDING):  atorvastatin 20 milliGRAM(s) Oral at bedtime  melatonin 3 milliGRAM(s) Oral at bedtime  metoprolol succinate  milliGRAM(s) Oral daily  pantoprazole    Tablet 40 milliGRAM(s) Oral before breakfast  sodium chloride 0.9%. 1000 milliLiter(s) (30 mL/Hr) IV Continuous <Continuous>    MEDICATIONS  (PRN):  acetaminophen   Tablet .. 1000 milliGRAM(s) Oral every 6 hours PRN Mild Pain (1 - 3)  aluminum hydroxide/magnesium hydroxide/simethicone Suspension 30 milliLiter(s) Oral every 6 hours PRN Dyspepsia         Vitals log        ICU Vital Signs Last 24 Hrs  T(C): 37.2 (11 Nov 2019 23:40), Max: 37.2 (11 Nov 2019 23:40)  T(F): 98.9 (11 Nov 2019 23:40), Max: 98.9 (11 Nov 2019 23:40)  HR: 95 (12 Nov 2019 05:29) (82 - 104)  BP: 114/59 (12 Nov 2019 05:29) (113/73 - 169/96)  BP(mean): 77 (12 Nov 2019 05:29) (77 - 77)  ABP: --  ABP(mean): --  RR: 16 (12 Nov 2019 05:29) (12 - 19)  SpO2: 90% (12 Nov 2019 05:29) (90% - 97%)           Input and Output:  I&O's Detail    11 Nov 2019 07:01  -  12 Nov 2019 06:25  --------------------------------------------------------  IN:    Oral Fluid: 120 mL  Total IN: 120 mL    OUT:    Voided: 1 mL  Total OUT: 1 mL    Total NET: 119 mL          Lab Data                        13.0   9.32  )-----------( 325      ( 11 Nov 2019 07:06 )             40.4     11-11    138  |  104  |  19  ----------------------------<  106<H>  4.4   |  29  |  0.85    Ca    9.4      11 Nov 2019 07:06  Mg     2.2     11-10              Review of Systems	      Objective     Physical Examination    heart s1s2  lung dec BS  abd soft      Pertinent Lab findings & Imaging      Sherly:  NO   Adequate UO     I&O's Detail    11 Nov 2019 07:01  -  12 Nov 2019 06:25  --------------------------------------------------------  IN:    Oral Fluid: 120 mL  Total IN: 120 mL    OUT:    Voided: 1 mL  Total OUT: 1 mL    Total NET: 119 mL               Discussed with:     Cultures:	        Radiology

## 2019-11-12 NOTE — PROGRESS NOTE ADULT - SUBJECTIVE AND OBJECTIVE BOX
CHIEF COMPLAINT/INTERVAL HISTORY:  Pt. seen and evaluated for lung mass.  Pt. is in no distress.  s/p thoracentesis and bronchoscopy with biopsy yesterday.      REVIEW OF SYSTEMS:  No fever, CP, SOB, or abdominal pain.      Vital Signs Last 24 Hrs  T(C): 37.3 (12 Nov 2019 07:34), Max: 37.3 (12 Nov 2019 07:34)  T(F): 99.1 (12 Nov 2019 07:34), Max: 99.1 (12 Nov 2019 07:34)  HR: 87 (12 Nov 2019 07:34) (82 - 104)  BP: 111/67 (12 Nov 2019 07:34) (111/67 - 169/96)  BP(mean): 77 (12 Nov 2019 05:29) (77 - 77)  RR: 17 (12 Nov 2019 07:34) (12 - 19)  SpO2: 90% (12 Nov 2019 07:34) (90% - 97%)    PHYSICAL EXAM:  GENERAL: NAD  HEENT: EOMI, hearing normal, conjunctiva and sclera clear  Chest: Dimnished at bases, no wheezing  CV: S1S2, RRR,   GI: soft, +BS, NT/ND  Musculoskeletal: trace LE edema  Psychiatric: affect nL, mood nL  Skin: warm and dry    LABS:                        13.1   9.12  )-----------( 317      ( 12 Nov 2019 06:48 )             41.5     11-12    138  |  103  |  19  ----------------------------<  94  4.5   |  29  |  0.84    Ca    9.6      12 Nov 2019 06:48  Mg     2.3     11-12      Assessment and Plan:  -Lung mass:  CT C/A/P with 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  No evidence for metastatic disease in the abdomen and pelvis.  s/p thoracentesis with 1885cc removed.  Pleural fluid consistent with exudate.  Pulmonary will contact patient in regards to pathology results when available.   -Elevated lactate:  Trending down.  Pt. is hemodynamically stable and does not appear toxic.    -HTN:  continue  Toprol XL 100mg PO daily  -HLD:  continue Lipitor 20mg PO QHS  -TIA:  continue Lipitor.  Restart Plavix 75mg PO daily  -VTE ppx:  SCD

## 2019-11-12 NOTE — PHYSICAL THERAPY INITIAL EVALUATION ADULT - ADDITIONAL COMMENTS
pt lives in the 2nd floor of a 2 family house, + steps. Dtr lives downstairs. pt ambulates independently without device and is independent with ADLs. + driving

## 2019-11-12 NOTE — DISCHARGE NOTE NURSING/CASE MANAGEMENT/SOCIAL WORK - PATIENT PORTAL LINK FT
You can access the FollowMyHealth Patient Portal offered by [facility] by registering at the following website: [email or web address]. By joining [facility]’s FollowMyHealth portal, you will also be able to view your health information using other applications (apps) compatible with our system.

## 2019-11-12 NOTE — DISCHARGE NOTE NURSING/CASE MANAGEMENT/SOCIAL WORK - NSDCPEPTSTRK_GEN_ALL_CORE
Risk factors for stroke/Stroke support groups for patients, families, and friends/Stroke warning signs and symptoms/Signs and symptoms of stroke/Need for follow up after discharge/Prescribed medications/Stroke education booklet/Call 911 for stroke

## 2019-11-13 LAB
CULTURE RESULTS: SIGNIFICANT CHANGE UP
SPECIMEN SOURCE: SIGNIFICANT CHANGE UP

## 2019-11-14 ENCOUNTER — INPATIENT (INPATIENT)
Facility: HOSPITAL | Age: 84
LOS: 4 days | Discharge: HOME HEALTH | DRG: 181 | End: 2019-11-19
Attending: FAMILY MEDICINE | Admitting: STUDENT IN AN ORGANIZED HEALTH CARE EDUCATION/TRAINING PROGRAM
Payer: MEDICARE

## 2019-11-14 VITALS
TEMPERATURE: 98 F | HEIGHT: 66 IN | RESPIRATION RATE: 18 BRPM | WEIGHT: 179.9 LBS | HEART RATE: 96 BPM | OXYGEN SATURATION: 92 % | DIASTOLIC BLOOD PRESSURE: 77 MMHG | SYSTOLIC BLOOD PRESSURE: 116 MMHG

## 2019-11-14 DIAGNOSIS — R06.02 SHORTNESS OF BREATH: ICD-10-CM

## 2019-11-14 DIAGNOSIS — Z29.9 ENCOUNTER FOR PROPHYLACTIC MEASURES, UNSPECIFIED: ICD-10-CM

## 2019-11-14 DIAGNOSIS — K21.9 GASTRO-ESOPHAGEAL REFLUX DISEASE WITHOUT ESOPHAGITIS: ICD-10-CM

## 2019-11-14 DIAGNOSIS — R79.89 OTHER SPECIFIED ABNORMAL FINDINGS OF BLOOD CHEMISTRY: ICD-10-CM

## 2019-11-14 DIAGNOSIS — R50.9 FEVER, UNSPECIFIED: ICD-10-CM

## 2019-11-14 DIAGNOSIS — G45.9 TRANSIENT CEREBRAL ISCHEMIC ATTACK, UNSPECIFIED: ICD-10-CM

## 2019-11-14 DIAGNOSIS — I10 ESSENTIAL (PRIMARY) HYPERTENSION: ICD-10-CM

## 2019-11-14 LAB
ALBUMIN SERPL ELPH-MCNC: 2.2 G/DL — LOW (ref 3.3–5)
ALP SERPL-CCNC: 74 U/L — SIGNIFICANT CHANGE UP (ref 40–120)
ALT FLD-CCNC: 35 U/L — SIGNIFICANT CHANGE UP (ref 12–78)
ANION GAP SERPL CALC-SCNC: 7 MMOL/L — SIGNIFICANT CHANGE UP (ref 5–17)
APTT BLD: 27.6 SEC — LOW (ref 28.5–37)
AST SERPL-CCNC: 45 U/L — HIGH (ref 15–37)
BASE EXCESS BLDV CALC-SCNC: 4.8 MMOL/L — HIGH (ref -2–2)
BASOPHILS # BLD AUTO: 0.04 K/UL — SIGNIFICANT CHANGE UP (ref 0–0.2)
BASOPHILS NFR BLD AUTO: 0.4 % — SIGNIFICANT CHANGE UP (ref 0–2)
BILIRUB SERPL-MCNC: 0.4 MG/DL — SIGNIFICANT CHANGE UP (ref 0.2–1.2)
BLOOD GAS COMMENTS, VENOUS: SIGNIFICANT CHANGE UP
BUN SERPL-MCNC: 19 MG/DL — SIGNIFICANT CHANGE UP (ref 7–23)
CALCIUM SERPL-MCNC: 9.7 MG/DL — SIGNIFICANT CHANGE UP (ref 8.5–10.1)
CHLORIDE SERPL-SCNC: 103 MMOL/L — SIGNIFICANT CHANGE UP (ref 96–108)
CK SERPL-CCNC: 42 U/L — SIGNIFICANT CHANGE UP (ref 26–308)
CO2 SERPL-SCNC: 27 MMOL/L — SIGNIFICANT CHANGE UP (ref 22–31)
CREAT SERPL-MCNC: 0.85 MG/DL — SIGNIFICANT CHANGE UP (ref 0.5–1.3)
EOSINOPHIL # BLD AUTO: 0.1 K/UL — SIGNIFICANT CHANGE UP (ref 0–0.5)
EOSINOPHIL NFR BLD AUTO: 1.1 % — SIGNIFICANT CHANGE UP (ref 0–6)
GLUCOSE SERPL-MCNC: 112 MG/DL — HIGH (ref 70–99)
HCO3 BLDV-SCNC: 26 MMOL/L — SIGNIFICANT CHANGE UP (ref 21–29)
HCT VFR BLD CALC: 40.2 % — SIGNIFICANT CHANGE UP (ref 39–50)
HGB BLD-MCNC: 12.9 G/DL — LOW (ref 13–17)
IMM GRANULOCYTES NFR BLD AUTO: 0.5 % — SIGNIFICANT CHANGE UP (ref 0–1.5)
INR BLD: 1.36 RATIO — HIGH (ref 0.88–1.16)
LACTATE SERPL-SCNC: 1.6 MMOL/L — SIGNIFICANT CHANGE UP (ref 0.7–2)
LACTATE SERPL-SCNC: 2.2 MMOL/L — HIGH (ref 0.7–2)
LYMPHOCYTES # BLD AUTO: 1.67 K/UL — SIGNIFICANT CHANGE UP (ref 1–3.3)
LYMPHOCYTES # BLD AUTO: 18.4 % — SIGNIFICANT CHANGE UP (ref 13–44)
MCHC RBC-ENTMCNC: 27.6 PG — SIGNIFICANT CHANGE UP (ref 27–34)
MCHC RBC-ENTMCNC: 32.1 GM/DL — SIGNIFICANT CHANGE UP (ref 32–36)
MCV RBC AUTO: 86.1 FL — SIGNIFICANT CHANGE UP (ref 80–100)
MONOCYTES # BLD AUTO: 1.43 K/UL — HIGH (ref 0–0.9)
MONOCYTES NFR BLD AUTO: 15.7 % — HIGH (ref 2–14)
NEUTROPHILS # BLD AUTO: 5.81 K/UL — SIGNIFICANT CHANGE UP (ref 1.8–7.4)
NEUTROPHILS NFR BLD AUTO: 63.9 % — SIGNIFICANT CHANGE UP (ref 43–77)
NRBC # BLD: 0 /100 WBCS — SIGNIFICANT CHANGE UP (ref 0–0)
PCO2 BLDV: 62 MMHG — HIGH (ref 35–50)
PH BLDV: 7.31 — LOW (ref 7.35–7.45)
PLATELET # BLD AUTO: 373 K/UL — SIGNIFICANT CHANGE UP (ref 150–400)
POTASSIUM SERPL-MCNC: 4.1 MMOL/L — SIGNIFICANT CHANGE UP (ref 3.5–5.3)
POTASSIUM SERPL-SCNC: 4.1 MMOL/L — SIGNIFICANT CHANGE UP (ref 3.5–5.3)
PROCALCITONIN SERPL-MCNC: 0.06 NG/ML — HIGH (ref 0–0.04)
PROT SERPL-MCNC: 6.8 G/DL — SIGNIFICANT CHANGE UP (ref 6–8.3)
PROTHROM AB SERPL-ACNC: 15.5 SEC — HIGH (ref 10–12.9)
PTH RELATED PROT SERPL-MCNC: <2 PMOL/L — SIGNIFICANT CHANGE UP
RBC # BLD: 4.67 M/UL — SIGNIFICANT CHANGE UP (ref 4.2–5.8)
RBC # FLD: 12.7 % — SIGNIFICANT CHANGE UP (ref 10.3–14.5)
SAO2 % BLDV: 46 % — LOW (ref 67–88)
SODIUM SERPL-SCNC: 137 MMOL/L — SIGNIFICANT CHANGE UP (ref 135–145)
TROPONIN I SERPL-MCNC: <.015 NG/ML — SIGNIFICANT CHANGE UP (ref 0.01–0.04)
WBC # BLD: 9.1 K/UL — SIGNIFICANT CHANGE UP (ref 3.8–10.5)
WBC # FLD AUTO: 9.1 K/UL — SIGNIFICANT CHANGE UP (ref 3.8–10.5)

## 2019-11-14 PROCEDURE — 93010 ELECTROCARDIOGRAM REPORT: CPT

## 2019-11-14 PROCEDURE — 99285 EMERGENCY DEPT VISIT HI MDM: CPT

## 2019-11-14 PROCEDURE — 99223 1ST HOSP IP/OBS HIGH 75: CPT

## 2019-11-14 PROCEDURE — 71275 CT ANGIOGRAPHY CHEST: CPT | Mod: 26

## 2019-11-14 PROCEDURE — 71045 X-RAY EXAM CHEST 1 VIEW: CPT | Mod: 26

## 2019-11-14 RX ORDER — ACETAMINOPHEN 500 MG
1000 TABLET ORAL ONCE
Refills: 0 | Status: COMPLETED | OUTPATIENT
Start: 2019-11-14 | End: 2019-11-14

## 2019-11-14 RX ORDER — ACETAMINOPHEN 500 MG
1000 TABLET ORAL ONCE
Refills: 0 | Status: DISCONTINUED | OUTPATIENT
Start: 2019-11-14 | End: 2019-11-14

## 2019-11-14 RX ORDER — SODIUM CHLORIDE 9 MG/ML
1000 INJECTION INTRAMUSCULAR; INTRAVENOUS; SUBCUTANEOUS ONCE
Refills: 0 | Status: COMPLETED | OUTPATIENT
Start: 2019-11-14 | End: 2019-11-14

## 2019-11-14 RX ORDER — PIPERACILLIN AND TAZOBACTAM 4; .5 G/20ML; G/20ML
3.38 INJECTION, POWDER, LYOPHILIZED, FOR SOLUTION INTRAVENOUS ONCE
Refills: 0 | Status: COMPLETED | OUTPATIENT
Start: 2019-11-14 | End: 2019-11-14

## 2019-11-14 RX ORDER — ACETAMINOPHEN 500 MG
1000 TABLET ORAL EVERY 6 HOURS
Refills: 0 | Status: DISCONTINUED | OUTPATIENT
Start: 2019-11-14 | End: 2019-11-19

## 2019-11-14 RX ORDER — LANOLIN ALCOHOL/MO/W.PET/CERES
3 CREAM (GRAM) TOPICAL AT BEDTIME
Refills: 0 | Status: COMPLETED | OUTPATIENT
Start: 2019-11-14 | End: 2019-11-14

## 2019-11-14 RX ORDER — PIPERACILLIN AND TAZOBACTAM 4; .5 G/20ML; G/20ML
3.38 INJECTION, POWDER, LYOPHILIZED, FOR SOLUTION INTRAVENOUS EVERY 8 HOURS
Refills: 0 | Status: DISCONTINUED | OUTPATIENT
Start: 2019-11-14 | End: 2019-11-14

## 2019-11-14 RX ORDER — SODIUM CHLORIDE 9 MG/ML
3 INJECTION INTRAMUSCULAR; INTRAVENOUS; SUBCUTANEOUS ONCE
Refills: 0 | Status: COMPLETED | OUTPATIENT
Start: 2019-11-14 | End: 2019-11-14

## 2019-11-14 RX ORDER — PANTOPRAZOLE SODIUM 20 MG/1
40 TABLET, DELAYED RELEASE ORAL
Refills: 0 | Status: DISCONTINUED | OUTPATIENT
Start: 2019-11-14 | End: 2019-11-19

## 2019-11-14 RX ORDER — METOPROLOL TARTRATE 50 MG
100 TABLET ORAL DAILY
Refills: 0 | Status: DISCONTINUED | OUTPATIENT
Start: 2019-11-14 | End: 2019-11-19

## 2019-11-14 RX ORDER — ENOXAPARIN SODIUM 100 MG/ML
40 INJECTION SUBCUTANEOUS DAILY
Refills: 0 | Status: DISCONTINUED | OUTPATIENT
Start: 2019-11-14 | End: 2019-11-14

## 2019-11-14 RX ORDER — ATORVASTATIN CALCIUM 80 MG/1
20 TABLET, FILM COATED ORAL AT BEDTIME
Refills: 0 | Status: DISCONTINUED | OUTPATIENT
Start: 2019-11-14 | End: 2019-11-19

## 2019-11-14 RX ORDER — CLOPIDOGREL BISULFATE 75 MG/1
75 TABLET, FILM COATED ORAL DAILY
Refills: 0 | Status: DISCONTINUED | OUTPATIENT
Start: 2019-11-14 | End: 2019-11-14

## 2019-11-14 RX ADMIN — Medication 1000 MILLIGRAM(S): at 22:12

## 2019-11-14 RX ADMIN — ATORVASTATIN CALCIUM 20 MILLIGRAM(S): 80 TABLET, FILM COATED ORAL at 21:38

## 2019-11-14 RX ADMIN — PIPERACILLIN AND TAZOBACTAM 200 GRAM(S): 4; .5 INJECTION, POWDER, LYOPHILIZED, FOR SOLUTION INTRAVENOUS at 17:23

## 2019-11-14 RX ADMIN — SODIUM CHLORIDE 1000 MILLILITER(S): 9 INJECTION INTRAMUSCULAR; INTRAVENOUS; SUBCUTANEOUS at 17:23

## 2019-11-14 RX ADMIN — SODIUM CHLORIDE 3 MILLILITER(S): 9 INJECTION INTRAMUSCULAR; INTRAVENOUS; SUBCUTANEOUS at 16:22

## 2019-11-14 RX ADMIN — Medication 3 MILLIGRAM(S): at 21:41

## 2019-11-14 RX ADMIN — Medication 1000 MILLIGRAM(S): at 21:42

## 2019-11-14 NOTE — ED ADULT NURSE NOTE - OBJECTIVE STATEMENT
Present to ER with c/o of shortness of breath , here recently for fluid on the lungs.  fevers at home only night.

## 2019-11-14 NOTE — ED CLERICAL - NS ED CLERK NOTE PRE-ARRIVAL INFORMATION; ADDITIONAL PRE-ARRIVAL INFORMATION
This patient is enrolled in a care navigation program and can be followed up by the care navigation team within 24 hours. To arrange close follow-up or to obtain additional clinical information about this patient, please call the contact number above.

## 2019-11-14 NOTE — H&P ADULT - NSICDXPASTMEDICALHX_GEN_ALL_CORE_FT
PAST MEDICAL HISTORY:  Carpal tunnel syndrome     Diverticulitis     GERD (gastroesophageal reflux disease)     Heart block     HTN (hypertension)     Hyperlipidemia     Pacemaker 2007    Prostate cancer     TIA (transient ischemic attack) last TIA over 5 years ago as per patient

## 2019-11-14 NOTE — ED ADULT NURSE REASSESSMENT NOTE - NS ED NURSE REASSESS COMMENT FT1
Received report from Vickie COFFEY. Patient resting in bed. No complaints of pain/discomfort at this time. Safety and comfort measures provided and maintained.

## 2019-11-14 NOTE — CONSULT NOTE ADULT - SUBJECTIVE AND OBJECTIVE BOX
Date/Time Patient Seen:  		  Referring MD:   Data Reviewed	       Patient is a 89y old  Male who presents with a chief complaint of     Subjective/HPI     PAST MEDICAL & SURGICAL HISTORY:  Pacemaker: 2007  Diverticulitis  TIA (transient ischemic attack): last TIA over 5 years ago as per patient  Prostate cancer  Carpal tunnel syndrome  HTN (hypertension)  Hyperlipidemia  GERD (gastroesophageal reflux disease)  Heart block  History of cataract surgery: bilateral  S/P cholecystectomy  Pacemaker: [employer] Model # [id number] Serial #[id number]  implant date 05/20/2011        Medication list         MEDICATIONS  (STANDING):    MEDICATIONS  (PRN):  acetaminophen   Tablet .. 1000 milliGRAM(s) Oral every 6 hours PRN Temp greater or equal to 38C (100.4F), Mild Pain (1 - 3)  aluminum hydroxide/magnesium hydroxide/simethicone Suspension 30 milliLiter(s) Oral every 4 hours PRN Dyspepsia  guaiFENesin   Syrup  (Sugar-Free) 200 milliGRAM(s) Oral every 6 hours PRN Cough         Vitals log        ICU Vital Signs Last 24 Hrs  T(C): 36.4 (14 Nov 2019 15:41), Max: 36.4 (14 Nov 2019 15:41)  T(F): 97.6 (14 Nov 2019 15:41), Max: 97.6 (14 Nov 2019 15:41)  HR: 96 (14 Nov 2019 15:41) (96 - 96)  BP: 116/77 (14 Nov 2019 15:41) (116/77 - 116/77)  BP(mean): --  ABP: --  ABP(mean): --  RR: 18 (14 Nov 2019 15:41) (18 - 18)  SpO2: 92% (14 Nov 2019 15:41) (92% - 92%)           Input and Output:  I&O's Detail      Lab Data                        12.9   9.10  )-----------( 373      ( 14 Nov 2019 16:30 )             40.2     11-14    137  |  103  |  19  ----------------------------<  112<H>  4.1   |  27  |  0.85    Ca    9.7      14 Nov 2019 16:30    TPro  6.8  /  Alb  2.2<L>  /  TBili  0.4  /  DBili  x   /  AST  45<H>  /  ALT  35  /  AlkPhos  74  11-14      CARDIAC MARKERS ( 14 Nov 2019 16:30 )  <.015 ng/mL / x     / 42 U/L / x     / x            Review of Systems	      Objective     Physical Examination    heart s1s2  lung dc BS  abd soft  alert  verbal  head nc  moves all extr      Pertinent Lab findings & Imaging      Dubois:  NO   Adequate UO     I&O's Detail           Discussed with:     Cultures:	        Radiology            EXAM:  XR CHEST AP OR PA 1V                            PROCEDURE DATE:  11/14/2019          INTERPRETATION:  Short of breath.    AP chest. Prior 11/11/2019.  Low lung volumes. Left ICD reidentified in position. No change heart   mediastinum. The loculated right pleural effusion unchanged. No change in   the fibrotic atelectatic changes right base. Infiltrate not excluded.    Impression: As above                MERRITT JOSE M.D., ATTENDING RADIOLOGIST  This document has been electronically signed. Nov 14 2019  4:25PM

## 2019-11-14 NOTE — H&P ADULT - ASSESSMENT
89yo M, w/ PMH/o HTN, HLD, carotid artery disease, heart block s/p PPM, h/o TIAs (last episode ~5yrs ago), h/o prostate cancer, h/o diverticulitis, gastritis, c/o shortness of breath, productive cough, and recurring fevers.

## 2019-11-14 NOTE — H&P ADULT - PROBLEM SELECTOR PLAN 2
- Tmax 101.3 at home, afebrile in the ED  - Tylenol 650mg q6h prn for fever  - S/p 1x Zosyn in the ED, continue Zosyn TID  - F/u RVP  - Monitor fever curve and daily CBC - Tmax 101.3 at home, afebrile in the ED, suspected tumor-related fevers  - Tylenol 650mg q6h prn for fever  - S/p 1x Zosyn in the ED  - Monitor fever off abx for now. If pt spikes fever, consider restarting Zosyn and giving 1 dose of Vancomycin  - F/u RVP, blood cultures, procal  - Monitor fever curve and daily CBC

## 2019-11-14 NOTE — H&P ADULT - PROBLEM SELECTOR PLAN 5
- H/o TIAs, last episode approx. 5 years ago  - Continue Plavix 75mg and statin daily. - H/o TIAs, last episode approx. 5 years ago  - Hold Plavix for possible IR biopsy tomorrow, may restart post-procedure  - Continue statin daily

## 2019-11-14 NOTE — H&P ADULT - NSHPREVIEWOFSYSTEMS_GEN_ALL_CORE
Orthopedic [employer] Note:    Patient: Srinivasa Jaquez    Date: 4/29/2019        To whom it may concern:    The above student is being treated in our office      Physical Education:  No gym for 6 weeks      Please feel free to call our practice for clarification    Medical Information is confidential and cannot be disclosed without the written consent of the patient or their representative      Sincerely,     Fei Mark MD    
CONSTITUTIONAL: admits fever, chills, weakness, decreased appetite  HEENT: denies blurred vision, sore throat  SKIN: denies new lesions, rash  CARDIOVASCULAR: denies chest pain, chest pressure, palpitations  RESPIRATORY: admits shortness of breath, sputum production, chest pain with cough  GASTROINTESTINAL: denies nausea, vomiting, diarrhea, abdominal pain  GENITOURINARY: denies dysuria, discharge  NEUROLOGICAL: denies numbness, headache, focal weakness  MUSCULOSKELETAL: admits chronic neck pain  HEMATOLOGIC: denies gross bleeding, bruising  LYMPHATICS: denies enlarged lymph nodes, extremity swelling

## 2019-11-14 NOTE — H&P ADULT - PROBLEM SELECTOR PLAN 4
- Chronic, stable  - Continue home Metoprolol daily with hold parameters  - Monitor routine hemodynamics. - Chronic, stable  - Continue home Metoprolol daily with hold parameters  - Monitor routine hemodynamics

## 2019-11-14 NOTE — H&P ADULT - PROBLEM SELECTOR PLAN 1
- Pt p/w SOB, cough, and fever in setting of new lung mass, s/p bronchoscopy and thoracentesis, path pending  - Admit to tele  - Evaluated by pulm Dr. Swartz, recommending repeat chest CT to evaluate recurrence of pleural effusion  - Keep pt NPO after midnight for possible transthoracic lung mass biopsy  - Continue incentive spirometry  - Tylenol 650mg q6h prn for fever  - Robitussin q6h prn for cough  - Monitor continuous pulse ox

## 2019-11-14 NOTE — ED PROVIDER NOTE - RESPIRATORY, MLM
Breath sounds with dec bs lower L chest , nl resp effort .CTA right. no acc muscle use Breath sounds with dec bs lower R chest , nl resp effort .CTA left. no acc muscle use

## 2019-11-14 NOTE — H&P ADULT - NSHPPHYSICALEXAM_GEN_ALL_CORE
T(C): 36.4 (11-14-19 @ 15:41), Max: 36.4 (11-14-19 @ 15:41)  HR: 96 (11-14-19 @ 15:41) (96 - 96)  BP: 116/77 (11-14-19 @ 15:41) (116/77 - 116/77)  RR: 18 (11-14-19 @ 15:41) (18 - 18)  SpO2: 92% (11-14-19 @ 15:41) (92% - 92%)    GENERAL: patient appears to be in mild respiratory distress but laying comfortably in bed  EYES: sclera clear, no exudates  ENMT: oropharynx clear without erythema, no exudates, moist mucous membranes  NECK: supple, soft, no thyromegaly noted  LUNGS: decreased breath sounds throughout right lung field with soft crackles at the R base, L lung CTA  HEART: soft S1/S2, regular rate and rhythm, no murmurs noted, no lower extremity edema  GASTROINTESTINAL: abdomen is soft, nontender, nondistended, normoactive bowel sounds, no palpable masses  INTEGUMENT: good skin turgor, no lesions noted  MUSCULOSKELETAL: no clubbing or cyanosis, no obvious deformity  NEUROLOGIC: awake, alert, oriented x3, good muscle tone in 4 extremities, no obvious sensory deficits  PSYCHIATRIC: mood is good, affect is congruent, linear and logical thought process  HEME/LYMPH: no palpable cervical/supraclavicular nodules, no obvious ecchymosis or petechiae T(C): 36.4 (11-14-19 @ 15:41), Max: 36.4 (11-14-19 @ 15:41)  HR: 96 (11-14-19 @ 15:41) (96 - 96)  BP: 116/77 (11-14-19 @ 15:41) (116/77 - 116/77)  RR: 18 (11-14-19 @ 15:41) (18 - 18)  SpO2: 92% (11-14-19 @ 15:41) (92% - 92%)    GENERAL: patient appears to be in mild respiratory distress but laying comfortably in bed  EYES: sclera clear, no exudates  ENMT: oropharynx clear without erythema, no exudates, moist mucous membranes  NECK: supple, soft, no thyromegaly noted  LUNGS: tachypneic on 2L NC, decreased breath sounds throughout right lung field with soft crackles at the R base, L lung CTA  HEART: soft S1/S2, regular rate and rhythm, no murmurs noted, no lower extremity edema  GASTROINTESTINAL: abdomen is soft, nontender, nondistended, normoactive bowel sounds, no palpable masses  INTEGUMENT: good skin turgor, no lesions noted  MUSCULOSKELETAL: no clubbing or cyanosis, no obvious deformity  NEUROLOGIC: awake, alert, oriented x3, good muscle tone in 4 extremities, no obvious sensory deficits  PSYCHIATRIC: mood is good, affect is congruent, linear and logical thought process  HEME/LYMPH: no palpable cervical/supraclavicular nodules, no obvious ecchymosis or petechiae

## 2019-11-14 NOTE — ED ADULT NURSE NOTE - NSIMPLEMENTINTERV_GEN_ALL_ED
Implemented All Universal Safety Interventions:  [place] to call system. Call bell, personal items and telephone within reach. Instruct patient to call for assistance. Room bathroom lighting operational. Non-slip footwear when patient is off stretcher. Physically safe environment: no spills, clutter or unnecessary equipment. Stretcher in lowest position, wheels locked, appropriate side rails in place.

## 2019-11-14 NOTE — H&P ADULT - NSICDXPASTSURGICALHX_GEN_ALL_CORE_FT
PAST SURGICAL HISTORY:  History of cataract surgery bilateral    Pacemaker [employer] Model # [id number] Serial #[id number]  implant date 05/20/2011    S/P cholecystectomy

## 2019-11-14 NOTE — ED PROVIDER NOTE - PSH
History of cataract surgery  bilateral  Pacemaker  [employer] Model # [id number] Serial #[id number]  implant date 05/20/2011  S/P cholecystectomy

## 2019-11-14 NOTE — H&P ADULT - PROBLEM SELECTOR PLAN 7
IMPROVE VTE Individual Risk Assessment          RISK                                                          Points  [  ] Previous VTE                                                3  [  ] Thrombophilia                                             2  [  ] Lower limb paralysis                                   2        (unable to hold up >15 seconds)    [  ] Current Cancer                                             2         (within 6 months)  [  ] Immobilization > 24 hrs                              1  [  ] ICU/CCU stay > 24 hours                             1  [ x ] Age > 60                                                         1    IMPROVE VTE Score: 1    - DVT ppx with Lovenox 40mg daily IMPROVE VTE Individual Risk Assessment          RISK                                                          Points  [  ] Previous VTE                                                3  [  ] Thrombophilia                                             2  [  ] Lower limb paralysis                                   2        (unable to hold up >15 seconds)    [  ] Current Cancer                                             2         (within 6 months)  [  ] Immobilization > 24 hrs                              1  [  ] ICU/CCU stay > 24 hours                             1  [ x ] Age > 60                                                         1    IMPROVE VTE Score: 1    - Hold pharm DVT ppx for possible biopsy tomorrow  - Consider DVT ppx with Lovenox 40mg daily post-procedure

## 2019-11-14 NOTE — PATIENT PROFILE ADULT - STATED REASON FOR ADMISSION
Pt stated that he was not feeling well for couple days. He was having fever and had difficulty breathing. Having persistent cough for 2-3 days

## 2019-11-14 NOTE — H&P ADULT - ATTENDING COMMENTS
Patient seen and examined.   89 M p/w shortness of breath and recurring fevers today, s/p newly diagnosed lung mass s/p bronchoscopy and thoracentesis last admission- path pending. Pulm consult appreciated, recommend Transthoracic lung biopsy.   Monitor off antibiotics, follow up cultures. Cards clearance for the procedure.

## 2019-11-14 NOTE — CONSULT NOTE ADULT - PROBLEM SELECTOR RECOMMENDATION 9
fever - sob - hypoxemia - Lung Mass - Pleural eff - Atelectasis -   s/p Bronch and Pleurocentesis - Path pending - Immunohistochemistry - suspicious for Malignancy - pt and family aware  I glenda - Tylenol PRN, Mylanta and Robitussin PRN for sx management  pall care eval -   will repeat Ct chest to eval recurrence of Pleural eff - and or other developments -   will keep NPO after midnight for consideration of transthoracic lung mass bx - will discuss with IR in am -   may benefit from Onc eval during this admission  RVP pending  supportive measures  emotional support  discussed plan of care with pt and dtr and wife

## 2019-11-14 NOTE — ED PROVIDER NOTE - OBJECTIVE STATEMENT
89 yo M pw feeling SOB x past ~ 1 - 2 days. Pt with persistent cough, on / off fever past few days since discharged from hospital . no abd pain. no n/v/d. no neck / back pain. no chest pains. Some fatigue. no neck / back pain. no agg/allev factors. Pt with known hx fluid on lungs , sp recent removal of fluid during past admission. No other co.

## 2019-11-14 NOTE — PATIENT PROFILE ADULT - ARRIVAL FROM
GILBERTO WU    Patient Age: 32 year old   Refill request by: Phone.  Caller informed to check with the pharmacy later for their refill.  If problems arise, we will contact patient.  Refill to be: ePrescribed to [facility] pharmacy    Medication requested to be refilled:   pantoprazole (PROTONIX) 40 MG tablet  Take 1 Tab by mouth 2 (two) times daily    [facility] [employer] [address] [place], [place] [id number] [facility] AT SEC OF [address] [facility]  [address] [place] [address]       Next and Last Visit with Provider and Department  Last visit with this provider: Visit date not found  Last visit with this department: Visit date not found    Next visit with this provider: 2/11/2019  Next visit with this department: 2/11/2019    WEIGHT AND HEIGHT:   Wt Readings from Last 1 Encounters:   12/04/18 (!) 145.2 kg (320 lb)     Ht Readings from Last 1 Encounters:   12/04/18 5' 10\" (1.778 m)     BMI Readings from Last 1 Encounters:   12/04/18 45.92 kg/m²       ALLERGIES: no known allergies.  Current Outpatient Medications   Medication   • amoxicillin-clavulanate (AUGMENTIN) 875-125 MG per tablet     No current facility-administered medications for this visit.      PHARMACY to use:[facility] [address] AT [address] [facility]  [address] [place] [address]       Pharmacy preference(s) on file:   [employer] [address] [place] [address] [facility] AT [address] [facility]  [address] [place] [id number]  Phone: [phone] Fax: [phone]      CALL BACK INFO: Ok to leave response (including medical information) with family member or on answering machine  ROUTING: Patient's physician/staff        PCP: Raysa Mcdonough PA-C         INS: Payor: EDWIN/PRISCILA / Plan: [id number] / Product Type: PPO MISC   PATIENT ADDRESS:  [address] [place] [id number]  
Medication refilled per protocol.      
Patient calling for a status of medication refill.   Patient states he is completely out of medication.  Please contact patient when medication is sent to pharmacy at [phone].  
Home

## 2019-11-14 NOTE — ED ADULT NURSE NOTE - PSH
History of cataract surgery  bilateral  Pacemaker  [employer] Model # [id number] Serial #[id number]  implant date 05/20/2011  S/P cholecystectomy

## 2019-11-14 NOTE — H&P ADULT - HISTORY OF PRESENT ILLNESS
89yo M, w/ PMH/o HTN, HLD, carotid artery disease, heart block s/p PPM, h/o TIAs (last episode ~5yrs ago), h/o prostate cancer, h/o diverticulitis, gastritis, c/o shortness of breath and recurring fevers. Pt was recently discharged from [place] on 11/12. 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 Thoracentesis was performed with removal of 1885cc of exudative fluid. Bronchoscopy was also performed. Since he returned home, he developed fevers, Tmax 101.3 last night, a/w chills and sweats that do respond to Tylenol but always return. Additionally, pt c/o productive cough, at first blood-tinged the first day at home, which the pt attributes to the bronchoscopy procedure, though sputum now nonbloody. Pt expresses SOB is interfering even with his most menial daily tasks. Denies palpitations, abdominal pain, N/V/D, urinary symptoms, new myalgia/arthralgia.    In the ED  VS T 97.6 /77 HR 96 RR 18 SpO2 92% on room air  Significant labs include:  CXR: Low lung volumes. Left ICD reidentified in position. No change heart mediastinum. The loculated right pleural effusion unchanged. No change in the fibrotic atelectatic changes right base. Infiltrate not excluded.  EKG: atrial-sensed ventricular-paced rhythm    Received 1L NS, 1x Zosyn. 89yo M, w/ PMH/o HTN, HLD, carotid artery disease, heart block s/p PPM, h/o TIAs (last episode ~5yrs ago), h/o prostate cancer, h/o diverticulitis, gastritis, c/o shortness of breath and recurring fevers. Pt was recently discharged from [place] on 11/12. 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 Thoracentesis was performed with removal of 1885cc of exudative fluid. Bronchoscopy was also performed. Since he returned home, he developed fevers, Tmax 101.3 last night, a/w chills and sweats that do respond to Tylenol but always return. Additionally, pt c/o productive cough, at first blood-tinged the first day at home, which the pt attributes to the bronchoscopy procedure, though sputum now nonbloody. Pt expresses SOB is interfering even with his most menial daily tasks and feels dyspneic at rest also. Denies palpitations, abdominal pain, N/V/D, urinary symptoms, new myalgia/arthralgia.    In the ED  VS T 97.6 /77 HR 96 RR 18 SpO2 92% on room air  Significant labs include: Lactate 2.2, Trop negative x1.  CXR: Low lung volumes. Left ICD reidentified in position. No change heart mediastinum. The loculated right pleural effusion unchanged. No change in the fibrotic atelectatic changes right base. Infiltrate not excluded.  EKG: atrial-sensed ventricular-paced rhythm    Received 1L NS, 1x Zosyn. Seen by pulm Dr. Swartz.

## 2019-11-14 NOTE — H&P ADULT - PROBLEM SELECTOR PLAN 3
- Lactate 2.2, s/p 1L in the ED  - F/u repeat lactate  - Likely 2/2 possible malignancy though infiltrate not excluded on CXR  - F/u chest CT  - F/u blood cultures  - Pt with recurrent fevers though no leukocytosis  - Started on Zosyn in the ED, continue TID  - Monitor fever curve and daily CBC - Lactate 2.2, s/p 1L in the ED  - F/u repeat lactate  - Likely 2/2 possible malignancy though infiltrate not excluded on CXR  - F/u chest CT  - F/u blood cultures  - Pt with recurrent fevers though no leukocytosis  - Started on Zosyn in the ED, monitor off abx for now  - Restart abx if fever recurs  - Monitor fever curve and daily CBC

## 2019-11-14 NOTE — ED PROVIDER NOTE - PROGRESS NOTE DETAILS
Aaron Swartz- check CT today, he will see pt, admit to med. Aaron Merino, will see pt to admit. Called [facility] [phone]

## 2019-11-15 ENCOUNTER — RESULT REVIEW (OUTPATIENT)
Age: 84
End: 2019-11-15

## 2019-11-15 DIAGNOSIS — R91.8 OTHER NONSPECIFIC ABNORMAL FINDING OF LUNG FIELD: ICD-10-CM

## 2019-11-15 LAB
ALBUMIN SERPL ELPH-MCNC: 2.2 G/DL — LOW (ref 3.3–5)
ALP SERPL-CCNC: 78 U/L — SIGNIFICANT CHANGE UP (ref 40–120)
ALT FLD-CCNC: 33 U/L — SIGNIFICANT CHANGE UP (ref 12–78)
ANION GAP SERPL CALC-SCNC: 3 MMOL/L — LOW (ref 5–17)
AST SERPL-CCNC: 42 U/L — HIGH (ref 15–37)
BASOPHILS # BLD AUTO: 0.05 K/UL — SIGNIFICANT CHANGE UP (ref 0–0.2)
BASOPHILS NFR BLD AUTO: 0.5 % — SIGNIFICANT CHANGE UP (ref 0–2)
BILIRUB SERPL-MCNC: 0.4 MG/DL — SIGNIFICANT CHANGE UP (ref 0.2–1.2)
BUN SERPL-MCNC: 17 MG/DL — SIGNIFICANT CHANGE UP (ref 7–23)
CALCIUM SERPL-MCNC: 9.8 MG/DL — SIGNIFICANT CHANGE UP (ref 8.5–10.1)
CHLORIDE SERPL-SCNC: 105 MMOL/L — SIGNIFICANT CHANGE UP (ref 96–108)
CO2 SERPL-SCNC: 32 MMOL/L — HIGH (ref 22–31)
CREAT SERPL-MCNC: 0.88 MG/DL — SIGNIFICANT CHANGE UP (ref 0.5–1.3)
CRP SERPL-MCNC: 16.76 MG/DL — HIGH (ref 0–0.4)
EOSINOPHIL # BLD AUTO: 0.05 K/UL — SIGNIFICANT CHANGE UP (ref 0–0.5)
EOSINOPHIL NFR BLD AUTO: 0.5 % — SIGNIFICANT CHANGE UP (ref 0–6)
FLU A RESULT: SIGNIFICANT CHANGE UP
FLU A RESULT: SIGNIFICANT CHANGE UP
FLUAV AG NPH QL: SIGNIFICANT CHANGE UP
FLUBV AG NPH QL: SIGNIFICANT CHANGE UP
GLUCOSE SERPL-MCNC: 109 MG/DL — HIGH (ref 70–99)
HCT VFR BLD CALC: 41.3 % — SIGNIFICANT CHANGE UP (ref 39–50)
HGB BLD-MCNC: 12.9 G/DL — LOW (ref 13–17)
IMM GRANULOCYTES NFR BLD AUTO: 0.6 % — SIGNIFICANT CHANGE UP (ref 0–1.5)
LYMPHOCYTES # BLD AUTO: 1.49 K/UL — SIGNIFICANT CHANGE UP (ref 1–3.3)
LYMPHOCYTES # BLD AUTO: 15.5 % — SIGNIFICANT CHANGE UP (ref 13–44)
MCHC RBC-ENTMCNC: 27.4 PG — SIGNIFICANT CHANGE UP (ref 27–34)
MCHC RBC-ENTMCNC: 31.2 GM/DL — LOW (ref 32–36)
MCV RBC AUTO: 87.7 FL — SIGNIFICANT CHANGE UP (ref 80–100)
MONOCYTES # BLD AUTO: 1.54 K/UL — HIGH (ref 0–0.9)
MONOCYTES NFR BLD AUTO: 16.1 % — HIGH (ref 2–14)
NEUTROPHILS # BLD AUTO: 6.4 K/UL — SIGNIFICANT CHANGE UP (ref 1.8–7.4)
NEUTROPHILS NFR BLD AUTO: 66.8 % — SIGNIFICANT CHANGE UP (ref 43–77)
NRBC # BLD: 0 /100 WBCS — SIGNIFICANT CHANGE UP (ref 0–0)
PLATELET # BLD AUTO: 390 K/UL — SIGNIFICANT CHANGE UP (ref 150–400)
PO2 BLDV: <44 MMHG — SIGNIFICANT CHANGE UP (ref 25–45)
POTASSIUM SERPL-MCNC: 5.1 MMOL/L — SIGNIFICANT CHANGE UP (ref 3.5–5.3)
POTASSIUM SERPL-SCNC: 5.1 MMOL/L — SIGNIFICANT CHANGE UP (ref 3.5–5.3)
PROT SERPL-MCNC: 6.6 G/DL — SIGNIFICANT CHANGE UP (ref 6–8.3)
RBC # BLD: 4.71 M/UL — SIGNIFICANT CHANGE UP (ref 4.2–5.8)
RBC # FLD: 12.6 % — SIGNIFICANT CHANGE UP (ref 10.3–14.5)
RSV RESULT: SIGNIFICANT CHANGE UP
RSV RNA RESP QL NAA+PROBE: SIGNIFICANT CHANGE UP
SODIUM SERPL-SCNC: 140 MMOL/L — SIGNIFICANT CHANGE UP (ref 135–145)
WBC # BLD: 9.59 K/UL — SIGNIFICANT CHANGE UP (ref 3.8–10.5)
WBC # FLD AUTO: 9.59 K/UL — SIGNIFICANT CHANGE UP (ref 3.8–10.5)

## 2019-11-15 PROCEDURE — 88334 PATH CONSLTJ SURG CYTO XM EA: CPT | Mod: 26

## 2019-11-15 PROCEDURE — 88341 IMHCHEM/IMCYTCHM EA ADD ANTB: CPT | Mod: 26,59

## 2019-11-15 PROCEDURE — 88333 PATH CONSLTJ SURG CYTO XM 1: CPT | Mod: 26

## 2019-11-15 PROCEDURE — 88342 IMHCHEM/IMCYTCHM 1ST ANTB: CPT | Mod: 26

## 2019-11-15 PROCEDURE — 88312 SPECIAL STAINS GROUP 1: CPT | Mod: 26

## 2019-11-15 PROCEDURE — 88305 TISSUE EXAM BY PATHOLOGIST: CPT | Mod: 26

## 2019-11-15 PROCEDURE — 32405: CPT

## 2019-11-15 PROCEDURE — 77012 CT SCAN FOR NEEDLE BIOPSY: CPT | Mod: 26

## 2019-11-15 PROCEDURE — 88313 SPECIAL STAINS GROUP 2: CPT | Mod: 26

## 2019-11-15 PROCEDURE — 99233 SBSQ HOSP IP/OBS HIGH 50: CPT | Mod: GC

## 2019-11-15 RX ORDER — ENOXAPARIN SODIUM 100 MG/ML
40 INJECTION SUBCUTANEOUS DAILY
Refills: 0 | Status: DISCONTINUED | OUTPATIENT
Start: 2019-11-16 | End: 2019-11-19

## 2019-11-15 RX ORDER — LANOLIN ALCOHOL/MO/W.PET/CERES
3 CREAM (GRAM) TOPICAL AT BEDTIME
Refills: 0 | Status: DISCONTINUED | OUTPATIENT
Start: 2019-11-15 | End: 2019-11-19

## 2019-11-15 RX ORDER — IPRATROPIUM/ALBUTEROL SULFATE 18-103MCG
3 AEROSOL WITH ADAPTER (GRAM) INHALATION ONCE
Refills: 0 | Status: COMPLETED | OUTPATIENT
Start: 2019-11-15 | End: 2019-11-15

## 2019-11-15 RX ORDER — LIDOCAINE 4 G/100G
1 CREAM TOPICAL DAILY
Refills: 0 | Status: DISCONTINUED | OUTPATIENT
Start: 2019-11-15 | End: 2019-11-19

## 2019-11-15 RX ORDER — IPRATROPIUM/ALBUTEROL SULFATE 18-103MCG
3 AEROSOL WITH ADAPTER (GRAM) INHALATION EVERY 8 HOURS
Refills: 0 | Status: DISCONTINUED | OUTPATIENT
Start: 2019-11-15 | End: 2019-11-19

## 2019-11-15 RX ORDER — LIDOCAINE 4 G/100G
1 CREAM TOPICAL ONCE
Refills: 0 | Status: COMPLETED | OUTPATIENT
Start: 2019-11-15 | End: 2019-11-15

## 2019-11-15 RX ADMIN — ATORVASTATIN CALCIUM 20 MILLIGRAM(S): 80 TABLET, FILM COATED ORAL at 21:48

## 2019-11-15 RX ADMIN — LIDOCAINE 1 PATCH: 4 CREAM TOPICAL at 11:45

## 2019-11-15 RX ADMIN — Medication 3 MILLILITER(S): at 07:46

## 2019-11-15 RX ADMIN — Medication 1000 MILLIGRAM(S): at 17:08

## 2019-11-15 RX ADMIN — LIDOCAINE 1 PATCH: 4 CREAM TOPICAL at 00:23

## 2019-11-15 RX ADMIN — LIDOCAINE 1 PATCH: 4 CREAM TOPICAL at 21:45

## 2019-11-15 RX ADMIN — LIDOCAINE 1 PATCH: 4 CREAM TOPICAL at 07:42

## 2019-11-15 RX ADMIN — Medication 1000 MILLIGRAM(S): at 09:29

## 2019-11-15 RX ADMIN — Medication 1000 MILLIGRAM(S): at 16:08

## 2019-11-15 RX ADMIN — Medication 100 MILLIGRAM(S): at 05:57

## 2019-11-15 RX ADMIN — Medication 3 MILLILITER(S): at 19:25

## 2019-11-15 RX ADMIN — PANTOPRAZOLE SODIUM 40 MILLIGRAM(S): 20 TABLET, DELAYED RELEASE ORAL at 05:56

## 2019-11-15 RX ADMIN — Medication 3 MILLILITER(S): at 01:53

## 2019-11-15 RX ADMIN — LIDOCAINE 1 PATCH: 4 CREAM TOPICAL at 12:06

## 2019-11-15 RX ADMIN — Medication 1000 MILLIGRAM(S): at 08:29

## 2019-11-15 NOTE — PROGRESS NOTE ADULT - ASSESSMENT
89yo M, w/ PMH/o HTN, HLD, carotid artery disease, heart block s/p PPM, h/o TIAs (last episode ~5yrs ago), h/o prostate cancer, h/o diverticulitis, gastritis, presented with shortness of breath, productive cough, and recurring fevers. In setting of recent hospitalization where work up revealed a RUL mass. Symptoms are likely 2/2 malignancy. Admitted for symptom management, biopsy, and possible thoracentesis. 89yo M, w/ PMH/o HTN, HLD, carotid artery disease, heart block s/p PPM, h/o TIAs (last episode ~5yrs ago), h/o prostate cancer, h/o diverticulitis, gastritis, presented with shortness of breath, productive cough, and recurring fevers. In setting of recent hospitalization where work up revealed a RUL mass. Symptoms are likely 2/2 malignancy. Admitted for symptom management, biopsy, and possible pleurocentesis. 89yo M, w/ PMH/o HTN, HLD, carotid artery disease, heart block s/p PPM, h/o TIAs (last episode ~5yrs ago), h/o prostate cancer, h/o diverticulitis, gastritis, presented with shortness of breath, productive cough, and recurring fevers. In setting of recent hospitalization where work up revealed a RUL mass. Symptoms are likely 2/2 malignancy.   Admitted rt   lung  mass with  recurrent  malignant rt side pleural effusion .

## 2019-11-15 NOTE — PROGRESS NOTE ADULT - PROBLEM SELECTOR PLAN 1
lung mass and pleural eff  s/p bronch and pleurocentesis earlier this week    path / immunohistochemistry pending from eff - however - suggestive of malignancy  consider Onc eval while inpatient  pt is NPO and off Plavix - will discuss with IR this am - poss Lung Mass Bx and / or another Pleurocentesis -   prognosis POOR - advanced age - and likely Malignant Mass right lung   will follow  NEBS  mylanta  robitussin   tylenol   sx management

## 2019-11-15 NOTE — CHART NOTE - NSCHARTNOTEFT_GEN_A_CORE
preprocedure  right apical lung mass biopsy. An informed consent obtained. History and physical reviewed. D/w Dr. Swartz. Risks and benefits explained in detail. Patient understands them well. All his doubts cleared. CT scan guided biopsy planned. Pathologist informed for touch slides.

## 2019-11-15 NOTE — PROGRESS NOTE ADULT - PROBLEM SELECTOR PLAN 1
- Pt p/w SOB, cough, and fever in setting of new lung mass, s/p bronchoscopy and thoracentesis, path pending  - CTA showing increase in size of right pleural effusion  - S/p IR transthoracic lung mass biopsy  - Duonebs every 8 hours  - Continue incentive spirometry  - Tylenol 650mg q6h prn for fever  - Robitussin q6h prn for cough  - Morphine prn for air hunger  - Monitor continuous pulse ox  - Pulmonology, Dr. Swartz consulted   -Heme onc, Dr. Tidwell consulted - Pt p/w SOB, cough, and fever in setting   new  rt lung mass, s/p bronchoscopy and thoracentesis, path pending  - CTA showing increase in size of right pleural effusion  - S/p IR transthoracic lung mass biopsy- possible malignancy   - Duonebs every 8 hours  - Continue incentive spirometry  - Tylenol 650mg q6h prn for fever  - Robitussin q6h prn for cough  - Morphine prn for air hunger  - Monitor continuous pulse ox  - Pulmonology, Dr. Swartz consulted   -Heme onc, Dr. Tidwell consulted

## 2019-11-15 NOTE — PROGRESS NOTE ADULT - PROBLEM SELECTOR PLAN 5
- H/o TIAs, last episode approx. 5 years ago  - Consider restart plavix***  - Continue statin daily - H/o TIAs, last episode approx. 5 years ago  - Restart plavix tomorrow AM  - Continue statin daily - H/o TIAs, last episode approx. 5 years ago  - Hold plavix for possible pleurocentesis   - Continue statin daily

## 2019-11-15 NOTE — PROGRESS NOTE ADULT - SUBJECTIVE AND OBJECTIVE BOX
Date/Time Patient Seen:  		  Referring MD:   Data Reviewed	       Patient is a 89y old  Male who presents with a chief complaint of SOB (14 Nov 2019 19:01)      Subjective/HPI     PAST MEDICAL & SURGICAL HISTORY:  Pacemaker: 2007  Diverticulitis  TIA (transient ischemic attack): last TIA over 5 years ago as per patient  Prostate cancer  Carpal tunnel syndrome  HTN (hypertension)  Hyperlipidemia  GERD (gastroesophageal reflux disease)  Heart block  History of cataract surgery: bilateral  S/P cholecystectomy  Pacemaker: [employer] Model # [id number] Serial #[id number]  implant date 05/20/2011        Medication list         MEDICATIONS  (STANDING):  albuterol/ipratropium for Nebulization 3 milliLiter(s) Nebulizer every 8 hours  atorvastatin 20 milliGRAM(s) Oral at bedtime  metoprolol succinate  milliGRAM(s) Oral daily  pantoprazole    Tablet 40 milliGRAM(s) Oral before breakfast    MEDICATIONS  (PRN):  acetaminophen   Tablet .. 1000 milliGRAM(s) Oral every 6 hours PRN Temp greater or equal to 38C (100.4F), Mild Pain (1 - 3)  aluminum hydroxide/magnesium hydroxide/simethicone Suspension 30 milliLiter(s) Oral every 4 hours PRN Dyspepsia  guaiFENesin   Syrup  (Sugar-Free) 200 milliGRAM(s) Oral every 6 hours PRN Cough  melatonin 3 milliGRAM(s) Oral at bedtime PRN Insomnia         Vitals log        ICU Vital Signs Last 24 Hrs  T(C): 36.7 (15 Nov 2019 05:11), Max: 37.2 (14 Nov 2019 23:48)  T(F): 98.1 (15 Nov 2019 05:11), Max: 99 (14 Nov 2019 23:48)  HR: 90 (15 Nov 2019 05:11) (78 - 98)  BP: 113/77 (15 Nov 2019 05:11) (113/77 - 180/84)  BP(mean): --  ABP: --  ABP(mean): --  RR: 18 (15 Nov 2019 05:11) (16 - 20)  SpO2: 95% (15 Nov 2019 05:11) (92% - 98%)           Input and Output:  I&O's Detail    14 Nov 2019 07:01  -  15 Nov 2019 07:00  --------------------------------------------------------  IN:    Oral Fluid: 50 mL  Total IN: 50 mL    OUT:    Voided: 400 mL  Total OUT: 400 mL    Total NET: -350 mL          Lab Data                        12.9   9.59  )-----------( 390      ( 15 Nov 2019 06:01 )             41.3     11-15    140  |  105  |  17  ----------------------------<  109<H>  5.1   |  32<H>  |  0.88    Ca    9.8      15 Nov 2019 06:01    TPro  6.6  /  Alb  2.2<L>  /  TBili  0.4  /  DBili  x   /  AST  42<H>  /  ALT  33  /  AlkPhos  78  11-15      CARDIAC MARKERS ( 14 Nov 2019 16:30 )  <.015 ng/mL / x     / 42 U/L / x     / x            Review of Systems	      Objective     Physical Examination    heart s1s2  lung dec BS  abd soft  head nc  cn grossly int      Pertinent Lab findings & Imaging      Sherly:  NO   Adequate UO     I&O's Detail    14 Nov 2019 07:01  -  15 Nov 2019 07:00  --------------------------------------------------------  IN:    Oral Fluid: 50 mL  Total IN: 50 mL    OUT:    Voided: 400 mL  Total OUT: 400 mL    Total NET: -350 mL               Discussed with:     Cultures:	        Radiology

## 2019-11-15 NOTE — PROGRESS NOTE ADULT - SUBJECTIVE AND OBJECTIVE BOX
Patient is a 89y old  Male who presents with a chief complaint of SOB (15 Nov 2019 07:08)      HPI:  89yo M, w/ PMH/o HTN, HLD, carotid artery disease, heart block s/p PPM, h/o TIAs (last episode ~5yrs ago), h/o prostate cancer, h/o diverticulitis, gastritis, c/o shortness of breath and recurring fevers. Pt was recently discharged from [place] on 11/12. 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 Thoracentesis was performed with removal of 1885cc of exudative fluid. Bronchoscopy was also performed. Since he returned home, he developed fevers, Tmax 101.3 last night, a/w chills and sweats that do respond to Tylenol but always return. Additionally, pt c/o productive cough, at first blood-tinged the first day at home, which the pt attributes to the bronchoscopy procedure, though sputum now nonbloody. Pt expresses SOB is interfering even with his most menial daily tasks and feels dyspneic at rest also. Denies palpitations, abdominal pain, N/V/D, urinary symptoms, new myalgia/arthralgia.    In the ED  VS T 97.6 /77 HR 96 RR 18 SpO2 92% on room air  Significant labs include: Lactate 2.2, Trop negative x1.  CXR: Low lung volumes. Left ICD reidentified in position. No change heart mediastinum. The loculated right pleural effusion unchanged. No change in the fibrotic atelectatic changes right base. Infiltrate not excluded.  EKG: atrial-sensed ventricular-paced rhythm    Received 1L NS, 1x Zosyn. Seen by pulm Dr. Swartz. (14 Nov 2019 19:01)    INTERVAL HPI/OVERNIGHT EVENTS: Overnight, patient felt short of breath, which improved with duoenb. Patient seen and examined at bedside. Patient reports that he feels comfortable and has not specific complaints. He continues to feel short of breath and feverish. He denies chills, chest pain, abdominal pain, urinary symptoms, LE pain, LE edema.     T(C): 36.2 (11-15-19 @ 12:23), Max: 37.2 (11-14-19 @ 23:48)  HR: 83 (11-15-19 @ 12:23) (78 - 99)  BP: 110/75 (11-15-19 @ 12:23) (110/75 - 180/84)  RR: 18 (11-15-19 @ 12:23) (16 - 20)  SpO2: 96% (11-15-19 @ 12:23) (92% - 98%)  Wt(kg): --  I&O's Summary    14 Nov 2019 07:01  -  15 Nov 2019 07:00  --------------------------------------------------------  IN: 50 mL / OUT: 400 mL / NET: -350 mL    REVIEW OF SYSTEMS:  CONSTITUTIONAL: Admits fever, fatigue. Denies weight loss  RESPIRATORY: Admits shortness of breath. No cough, wheezing, chills or hemoptysis;   CARDIOVASCULAR: No chest pain, palpitations, dizziness, or leg swelling  GASTROINTESTINAL: No abdominal or epigastric pain. No nausea, vomiting, or hematemesis; No diarrhea or constipation. No melena or hematochezia.  GENITOURINARY: No dysuria, frequency, hematuria, or incontinence  NEUROLOGICAL: No headaches, memory loss, loss of strength, numbness, or tremors  MUSCULOSKELETAL: No joint pain or swelling; No muscle, back, or extremity pain    PHYSICAL EXAM:  GENERAL: NAD, well-groomed, well-developed  HEAD:  Atraumatic, Normocephalic  EYES: EOMI, PERRLA, conjunctiva and sclera clear  ENMT:  Moist mucous membranes  NECK: Supple, No JVD  NERVOUS SYSTEM:  No focal motor or sensory deficits  CHEST/LUNG: Decreased breath sounds bilaterally; No rales, rhonchi, wheezing, or rubs  HEART: Regular rate and rhythm; No murmurs, rubs, or gallops  ABDOMEN: Soft, Nontender, Nondistended  EXTREMITIES:  2+ Peripheral Pulses, No clubbing, cyanosis, or edema    MEDICATIONS  (STANDING):  albuterol/ipratropium for Nebulization 3 milliLiter(s) Nebulizer every 8 hours  atorvastatin 20 milliGRAM(s) Oral at bedtime  lidocaine   Patch 1 Patch Transdermal daily  metoprolol succinate  milliGRAM(s) Oral daily  pantoprazole    Tablet 40 milliGRAM(s) Oral before breakfast    MEDICATIONS  (PRN):  acetaminophen   Tablet .. 1000 milliGRAM(s) Oral every 6 hours PRN Temp greater or equal to 38C (100.4F), Mild Pain (1 - 3)  aluminum hydroxide/magnesium hydroxide/simethicone Suspension 30 milliLiter(s) Oral every 4 hours PRN Dyspepsia  guaiFENesin   Syrup  (Sugar-Free) 200 milliGRAM(s) Oral every 6 hours PRN Cough  melatonin 3 milliGRAM(s) Oral at bedtime PRN Insomnia      LABS:                        12.9   9.59  )-----------( 390      ( 15 Nov 2019 06:01 )             41.3     11-15    140  |  105  |  17  ----------------------------<  109<H>  5.1   |  32<H>  |  0.88    Ca    9.8      15 Nov 2019 06:01    TPro  6.6  /  Alb  2.2<L>  /  TBili  0.4  /  DBili  x   /  AST  42<H>  /  ALT  33  /  AlkPhos  78  11-15    PT/INR - ( 14 Nov 2019 16:30 )   PT: 15.5 sec;   INR: 1.36 ratio         PTT - ( 14 Nov 2019 16:30 )  PTT:27.6 sec    CAPILLARY BLOOD GLUCOSE    11-11 @ 20:30   Culture is being performed.  --  --  11-11 @ 15:17   Culture is being performed.  --  --    RADIOLOGY & ADDITIONAL TESTS:    Imaging Personally Reviewed:     CTA chest showing interval increase in side of R pleural effusion Patient is a 89y old  Male who presents with a chief complaint of SOB (15 Nov 2019 07:08)      HPI:  89yo M, w/ PMH/o HTN, HLD, carotid artery disease, heart block s/p PPM, h/o TIAs (last episode ~5yrs ago), h/o prostate cancer, h/o diverticulitis, gastritis, c/o shortness of breath and recurring fevers. Pt was recently discharged from [place] on 11/12. 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 Thoracentesis was performed with removal of 1885cc of exudative fluid. Bronchoscopy was also performed. Since he returned home, he developed fevers, Tmax 101.3 last night, a/w chills and sweats that do respond to Tylenol but always return. Additionally, pt c/o productive cough, at first blood-tinged the first day at home, which the pt attributes to the bronchoscopy procedure, though sputum now nonbloody. Pt expresses SOB is interfering even with his most menial daily tasks and feels dyspneic at rest also. Denies palpitations, abdominal pain, N/V/D, urinary symptoms, new myalgia/arthralgia.  admitted with  rt   lung  mass  recurrent rt side   malignant pleural effusion  going ir guided biopsy -  .     INTERVAL HPI/OVERNIGHT EVENTS: Overnight, patient felt short of breath, which improved with duoenb. Patient seen and examined at bedside. Patient reports that he feels comfortable and has not specific complaints. pt continues to feel short of breath and feverish. He denies chills, chest pain, abdominal pain, urinary symptoms, LE pain, LE edema.     T(C): 36.2 (11-15-19 @ 12:23), Max: 37.2 (11-14-19 @ 23:48)  HR: 83 (11-15-19 @ 12:23) (78 - 99)  BP: 110/75 (11-15-19 @ 12:23) (110/75 - 180/84)  RR: 18 (11-15-19 @ 12:23) (16 - 20)  SpO2: 96% (11-15-19 @ 12:23) (92% - 98%)  Wt(kg): --  I&O's Summary    14 Nov 2019 07:01  -  15 Nov 2019 07:00  --------------------------------------------------------  IN: 50 mL / OUT: 400 mL / NET: -350 mL    REVIEW OF SYSTEMS:  CONSTITUTIONAL: Admits fever, fatigue. Denies weight loss  RESPIRATORY: Admits shortness of breath. No cough, wheezing, chills   CARDIOVASCULAR: No chest pain, palpitations, dizziness, or leg swelling  GASTROINTESTINAL: No abdominal or epigastric pain. No nausea, vomiting,   ; No diarrhea or constipation. No melena .  GENITOURINARY: No dysuria, frequency, hematuria, or incontinence  NEUROLOGICAL: No headaches, memory loss, loss of strength, numbness, or tremors  MUSCULOSKELETAL: No joint pain or swelling; No muscle, back, or extremity pain    PHYSICAL EXAM:  GENERAL: NAD, well-groomed, well-developed  HEAD:  Atraumatic, Normocephalic  EYES: EOMI, PERRLA, conjunctiva and sclera clear  ENMT:  Moist mucous membranes  NECK: Supple, No JVD  NERVOUS SYSTEM:  No focal motor or sensory deficits  CHEST/LUNG: Decreased breath sounds bilaterally; No rales, rhonchi, wheezing,   HEART: Regular rate and rhythm; No murmurs, no tachy   ABDOMEN: Soft, Nontender, Nondistended  EXTREMITIES:  2+ Peripheral Pulses, No clubbing, cyanosis, or edema    MEDICATIONS  (STANDING):  albuterol/ipratropium for Nebulization 3 milliLiter(s) Nebulizer every 8 hours  atorvastatin 20 milliGRAM(s) Oral at bedtime  lidocaine   Patch 1 Patch Transdermal daily  metoprolol succinate  milliGRAM(s) Oral daily  pantoprazole    Tablet 40 milliGRAM(s) Oral before breakfast    MEDICATIONS  (PRN):  acetaminophen   Tablet .. 1000 milliGRAM(s) Oral every 6 hours PRN Temp greater or equal to 38C (100.4F), Mild Pain (1 - 3)  aluminum hydroxide/magnesium hydroxide/simethicone Suspension 30 milliLiter(s) Oral every 4 hours PRN Dyspepsia  guaiFENesin   Syrup  (Sugar-Free) 200 milliGRAM(s) Oral every 6 hours PRN Cough  melatonin 3 milliGRAM(s) Oral at bedtime PRN Insomnia      LABS:                        12.9   9.59  )-----------( 390      ( 15 Nov 2019 06:01 )             41.3     11-15    140  |  105  |  17  ----------------------------<  109<H>  5.1   |  32<H>  |  0.88    Ca    9.8      15 Nov 2019 06:01    TPro  6.6  /  Alb  2.2<L>  /  TBili  0.4  /  DBili  x   /  AST  42<H>  /  ALT  33  /  AlkPhos  78  11-15    PT/INR - ( 14 Nov 2019 16:30 )   PT: 15.5 sec;   INR: 1.36 ratio         PTT - ( 14 Nov 2019 16:30 )  PTT:27.6 sec    CAPILLARY BLOOD GLUCOSE    11-11 @ 20:30   Culture is being performed.  --  --  11-11 @ 15:17   Culture is being performed.  --  --    RADIOLOGY & ADDITIONAL TESTS:    Imaging Personally Reviewed:     CTA chest showing interval increase in side of R pleural effusion

## 2019-11-15 NOTE — GOALS OF CARE CONVERSATION - ADVANCED CARE PLANNING - CONVERSATION DETAILS
Referral received for advanced directives.Patient in the OR, will follow up.Patient has a HCP Lyudmila Camilo. will place on chart.

## 2019-11-15 NOTE — CONSULT NOTE ADULT - SUBJECTIVE AND OBJECTIVE BOX
Patient is a 89y old  Male who presents with a chief complaint of SOB (15 Nov 2019 14:23)      HPI:  89yo M, w/ PMH/o HTN, HLD, carotid artery disease, heart block s/p PPM, h/o TIAs (last episode ~5yrs ago), h/o prostate cancer, h/o diverticulitis, gastritis, c/o shortness of breath and recurring fevers. Pt was recently discharged from [place] on 11/12. 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 Thoracentesis was performed with removal of 1885cc of exudative fluid. Bronchoscopy was also performed. Since he returned home, he developed fevers, Tmax 101.3 last night, a/w chills and sweats that do respond to Tylenol but always return. Additionally, pt c/o productive cough, at first blood-tinged the first day at home, which the pt attributes to the bronchoscopy procedure, though sputum now nonbloody. Pt expresses SOB is interfering even with his most menial daily tasks and feels dyspneic at rest also. Denies palpitations, abdominal pain, N/V/D, urinary symptoms, new myalgia/arthralgia.    In the ED  VS T 97.6 /77 HR 96 RR 18 SpO2 92% on room air  Significant labs include: Lactate 2.2, Trop negative x1.  CXR: Low lung volumes. Left ICD reidentified in position. No change heart mediastinum. The loculated right pleural effusion unchanged. No change in the fibrotic atelectatic changes right base. Infiltrate not excluded.  EKG: atrial-sensed ventricular-paced rhythm    Received 1L NS, 1x Zosyn. Seen by pulm Dr. Swartz. (14 Nov 2019 19:01)       ROS:  Negative except for:    PAST MEDICAL & SURGICAL HISTORY:  Pacemaker: 2007  Diverticulitis  TIA (transient ischemic attack): last TIA over 5 years ago as per patient  Prostate cancer  Carpal tunnel syndrome  HTN (hypertension)  Hyperlipidemia  GERD (gastroesophageal reflux disease)  Heart block  History of cataract surgery: bilateral  S/P cholecystectomy  Pacemaker: [employer] Model # [id number] Serial #[id number]  implant date 05/20/2011      SOCIAL HISTORY:    FAMILY HISTORY:      MEDICATIONS  (STANDING):  albuterol/ipratropium for Nebulization 3 milliLiter(s) Nebulizer every 8 hours  atorvastatin 20 milliGRAM(s) Oral at bedtime  lidocaine   Patch 1 Patch Transdermal daily  metoprolol succinate  milliGRAM(s) Oral daily  pantoprazole    Tablet 40 milliGRAM(s) Oral before breakfast    MEDICATIONS  (PRN):  acetaminophen   Tablet .. 1000 milliGRAM(s) Oral every 6 hours PRN Temp greater or equal to 38C (100.4F), Mild Pain (1 - 3)  aluminum hydroxide/magnesium hydroxide/simethicone Suspension 30 milliLiter(s) Oral every 4 hours PRN Dyspepsia  guaiFENesin   Syrup  (Sugar-Free) 200 milliGRAM(s) Oral every 6 hours PRN Cough  melatonin 3 milliGRAM(s) Oral at bedtime PRN Insomnia      Allergies    No Known Allergies    Intolerances        Vital Signs Last 24 Hrs  T(C): 36.2 (15 Nov 2019 12:23), Max: 37.2 (14 Nov 2019 23:48)  T(F): 97.2 (15 Nov 2019 12:23), Max: 99 (14 Nov 2019 23:48)  HR: 83 (15 Nov 2019 12:23) (78 - 99)  BP: 110/75 (15 Nov 2019 12:23) (110/75 - 180/84)  BP(mean): --  RR: 18 (15 Nov 2019 12:23) (16 - 20)  SpO2: 96% (15 Nov 2019 12:23) (92% - 98%)    PHYSICAL EXAM  General: adult in NAD  HEENT: clear oropharynx, anicteric sclera, pink conjunctivae  Neck: supple  CV: normal S1S2 with no murmur rubs or gallops  Lungs: clear to auscultation, no wheezes, no rhales  Abdomen: soft non-tender non-distended, no hepato/splenomegaly  Ext: no clubbing cyanosis or edema  Skin: no rashes and no petichiae  Neuro: alert and oriented X3 no focal deficits      LABS:    CBC Full  -  ( 15 Nov 2019 06:01 )  WBC Count : 9.59 K/uL  RBC Count : 4.71 M/uL  Hemoglobin : 12.9 g/dL  Hematocrit : 41.3 %  Platelet Count - Automated : 390 K/uL  Mean Cell Volume : 87.7 fl  Mean Cell Hemoglobin : 27.4 pg  Mean Cell Hemoglobin Concentration : 31.2 gm/dL  Auto Neutrophil # : 6.40 K/uL  Auto Lymphocyte # : 1.49 K/uL  Auto Monocyte # : 1.54 K/uL  Auto Eosinophil # : 0.05 K/uL  Auto Basophil # : 0.05 K/uL  Auto Neutrophil % : 66.8 %  Auto Lymphocyte % : 15.5 %  Auto Monocyte % : 16.1 %  Auto Eosinophil % : 0.5 %  Auto Basophil % : 0.5 %    11-15    140  |  105  |  17  ----------------------------<  109<H>  5.1   |  32<H>  |  0.88    Ca    9.8      15 Nov 2019 06:01    TPro  6.6  /  Alb  2.2<L>  /  TBili  0.4  /  DBili  x   /  AST  42<H>  /  ALT  33  /  AlkPhos  78  11-15    PT/INR - ( 14 Nov 2019 16:30 )   PT: 15.5 sec;   INR: 1.36 ratio         PTT - ( 14 Nov 2019 16:30 )  PTT:27.6 sec          BLOOD SMEAR INTERPRETATION:    RADIOLOGY & ADDITIONAL STUDIES:  < from: CT Angio Chest w/ IV Cont (11.14.19 @ 20:04) >  EXAM:  CT ANGIO CHEST (W)AW IC                            PROCEDURE DATE:  11/14/2019          INTERPRETATION:  CLINICAL INFORMATION: Right pleural mass, pleural   effusion. Shortness of breath. Suspected pulmonary embolism.    COMPARISON: CT chest11/8/2019.    PROCEDURE:   CT Angiography of the Chest.  90 ml of Omnipaque 350 was injected intravenously. 10 ml were discarded.  Sagittal and coronal reformats were performed as well as 3D (MIP)   reconstructions.      FINDINGS:    LUNGS AND AIRWAYS: Patent central airways.  The right upper lobe mass is   again noted, inseparable from the mediastinum and extending superiorly to   the right lung apex and inferiorly to the right hilum, unchanged. The   mass however was better assessed on the previous exam due to the   differences in postcontrast phase of enhancement.    PLEURA: Worsening loculated right pleural effusion. Trace left   pericardial effusion. Diffuse right pleural nodularity/mass, unchanged.    MEDIASTINUM AND SABA: No left hilarlymphadenopathy. Right upper lobe   mass contiguous with the mediastinum and right hilum. This appearance is   unchanged.    VESSELS: No pulmonary embolism. The thoracic aorta and main pulmonary   artery are normal in caliber. Again, there is compression of the SVC from   the right upper lobe mass.    HEART: Heart is normal in size. Pacemaker leads in the right atrium and   right ventricle. No pericardial effusion.    CHEST WALL AND LOWER NECK: The thyroid gland is within normal limits.   There is no supraclavicular or axillary lymphadenopathy. Generator pack   for pacemaker is in the left anterior chest wall with leads through a   left subclavian approach.    VISUALIZED UPPER ABDOMEN: The adrenal glands are within normal limits.   Patient isstatus postcholecystectomy.    BONES: Within normal limits.    IMPRESSION:    No pulmonary embolism.    Right upper lobe mass contiguous with the right lung apex and right   hilum, and inseparable from the mediastinum and appears to cause mass   effect on the SVC, overall unchanged in appearance from 11/8/2019.    Diffuse pleural masses.    Interval increase in loculated right pleural effusion.                    ADELA SRINIVASAN M.D. ATTENDING RADIOLOGIST  This document has been electronically signed. Nov 14 2019  8:20PM          < end of copied text >    Cytopathology - Non Gyn Report:   ACCESSION No:  [id number]    RONNIE LIN                     1        Cytopathology Report            Specimen(s) Submitted  Bronchial washings, right middle, lower and upper lobe      Clinical History  Right upper lobe mass, suspect lung cancer  Procedure: Bronchoscopy, washings, right upper lobe/right lower  lboe, right middle lobe      Gross Description  50 cc cloudy pink fluid received in 50% alcohol      Final Diagnosis  NEGATIVE FOR MALIGNANT CELLS.  Specimen consists of benign bronchial epithelial cells, squamous  cells, macrophages and leukocytes.    Screened by: Polly Givens M.D.  Verified by: Polly Givens M.D.  (Electronic Signature)  Reported on: 11/13/19 14:33 [facility], [address] [place] [address]  Phone: [phone]   Fax: [phone]  _________________________________________________________________ (11.11.19 @ 15:18)    Cytopathology - Non Gyn Report (11.11.19 @ 10:45)    Cytopathology - Non Gyn Report:   ACCESSION No:  [id number]    RONNIE LIN                     2        Cytopathology Addendum Report          Addendum  Immunohistochemical analysis demonstrates atypical cells  consistent with reactive mesothelial cells and histiocytes.  Negative for carcinoma.    See attached report from [facility].    Verified by: VICKIE BRANNON  (Electronic Signature)  Reported on: 11/15/19 10:04 [facility], [address] [place] [address]  Phone: [phone]   Fax: [phone]  _________________________________________________________________      Cytopathology Report            Specimen(s) Submitted  Pleural fluid, right      Clinical History  Pleural effusion/fluid right side, rule out cancer  Procedure: Sono-guided right thoracentesis      Gross Description  80 cc cloudy pink fluid received in 50% alcohol      Final Diagnosis  ATYPICAL FINDINGS.  Scattered markedly atypical cells noted (see note).  Background of benign mesothelial cells, histiocytes, lymphocytes  and neutrophils.      Note: Immunohistochemical analysis for further evaluation is  pending; results will be reported separately in an              RONNIE LIN                     2        Cytopathology Report          addendum to follow.    Screened by: VICKIE BRANNON  Verified by: VICKIE BRANNON  (Electronic Signature)  Reported on: 11/12/19 14:29 [facility], [address] [place] [address]  Phone: [phone]   Fax: [phone]  _________________________________________________________________

## 2019-11-15 NOTE — CONSULT NOTE ADULT - ASSESSMENT
Right lung mass suspicious for malignancy. is s/p right lung biopsy today    Recommendations  1.  continue present medical workup  2.  await pathology. should add biomarker studies including foundation one and PD L-1. may not be a candidate for chemotherapy but potentially for immunotherapy pending workup  3.  will need additional staging when stable ie MRI brain  4.  further heme onc recommendations pending above

## 2019-11-15 NOTE — CHART NOTE - NSCHARTNOTEFT_GEN_A_CORE
Notified by RN that patient having difficulty sleeping and feels he is "not breathing right."    Patient seen and examined at bedside by PGY-1. Patient states difficulty sleeping and some shortness of breath. No other complaints at this time. Denies lightheadedness, headache, vision changes, chest pain, palpitations, cough, fever/chills, n/v/d, or abdominal pain.    T(C): 36.8 (11-15-19 @ 00:55), Max: 37.2 (11-14-19 @ 23:48)  HR: 98 (11-15-19 @ 00:55) (78 - 98)  BP: 163/56 (11-15-19 @ 00:55) (116/77 - 180/84)  RR: 20 (11-15-19 @ 00:55) (16 - 20)  SpO2: 92% (11-15-19 @ 00:55) (92% - 98%)    GENERAL: patient in mild distress  LUNGS: b/l wheezing  HEART: S1/S2, regular rate and rhythm  GASTROINTESTINAL: abdomen is soft, nontender, nondistended, normoactive bowel sounds  NEUROLOGIC: awake, alert, oriented x3, good muscle tone in 4 extremities, no obvious sensory deficits    Assessment/Plan: 89yo M, w/ PMH/o HTN, HLD, carotid artery disease, heart block s/p PPM, h/o TIAs (last episode ~5yrs ago), h/o prostate cancer, h/o diverticulitis, gastritis, c/o shortness of breath, productive cough, and recurring fevers. Now with persistent shortness of breath.   - Patient hemodynamically stable and saturating well on NC at this time  - Will give duoneb treatment x1 as patient wheezing b/l on exam and with subjective SOB   - Continue continuous pulse ox and incentive spirometry while awake   - Continue Robitussin PRN   - Tylenol x1 and lidocaine patch x1 given for chronic neck pain   - Pulm following (Dr. Swartz)   - Will continue to follow, RN to call with changes    Jennifer Bardales MD PGY-1  [phone]

## 2019-11-15 NOTE — PROGRESS NOTE ADULT - PROBLEM SELECTOR PLAN 4
- Chronic, stable  - Continue home Metoprolol daily with hold parameters  - Monitor routine hemodynamics - Chronic, stable  - Continue home Metoprolol   - Monitor routine hemodynamics

## 2019-11-15 NOTE — PROGRESS NOTE ADULT - PROBLEM SELECTOR PLAN 7
- Hold pharm DVT ppx for possible thoracentesis tomorrow***  - Consider DVT ppx with Lovenox 40mg daily post-procedure - Lovenox tomorrow AM

## 2019-11-15 NOTE — PROGRESS NOTE ADULT - PROBLEM SELECTOR PLAN 2
- Tmax 101.3 at home, afebrile in the ED, suspected cancer-related fevers. Concern for post-obstructive pna low  - Tylenol 650mg q6h prn for fever  - S/p 1x Zosyn in the ED  - Monitor fever off abx for now. If pt spikes fever, consider restarting Zosyn and giving 1 dose of Vancomycin  - pro michelle .06, lactate 1.6, flu A/B and RSV neg  - Monitor fever curve and daily CBC - Tmax 101.3 at home, afebrile in the ED,   suspected cancer-related fevers. Concern for post-obstructive pna low  - Tylenol 650mg q6h prn for fever  - S/p 1x Zosyn in the ED  - Monitor fever off abx for now. If pt spikes fever, consider restarting Zosyn and giving 1 dose of Vancomycin  - pro michelle .06, lactate 1.6, flu A/B and RSV neg  - Monitor fever curve and daily CBC

## 2019-11-15 NOTE — PROGRESS NOTE ADULT - PROBLEM SELECTOR PLAN 3
- Lactate 2.2, s/p 1L in the ED. Repeat 1.6  - Likely 2/2 possible malignancy though infiltrate not excluded on CXR  - Pt with recurrent fevers though no leukocytosis  - Started on Zosyn in the ED, monitor off abx for now  - Restart abx if fever recurs  - Monitor fever curve and daily CBC

## 2019-11-16 LAB
ANION GAP SERPL CALC-SCNC: 8 MMOL/L — SIGNIFICANT CHANGE UP (ref 5–17)
BUN SERPL-MCNC: 18 MG/DL — SIGNIFICANT CHANGE UP (ref 7–23)
CALCIUM SERPL-MCNC: 9.6 MG/DL — SIGNIFICANT CHANGE UP (ref 8.5–10.1)
CHLORIDE SERPL-SCNC: 105 MMOL/L — SIGNIFICANT CHANGE UP (ref 96–108)
CO2 SERPL-SCNC: 28 MMOL/L — SIGNIFICANT CHANGE UP (ref 22–31)
CREAT SERPL-MCNC: 0.86 MG/DL — SIGNIFICANT CHANGE UP (ref 0.5–1.3)
CULTURE RESULTS: SIGNIFICANT CHANGE UP
GLUCOSE SERPL-MCNC: 191 MG/DL — HIGH (ref 70–99)
HCT VFR BLD CALC: 40.7 % — SIGNIFICANT CHANGE UP (ref 39–50)
HGB BLD-MCNC: 12.6 G/DL — LOW (ref 13–17)
MCHC RBC-ENTMCNC: 27.3 PG — SIGNIFICANT CHANGE UP (ref 27–34)
MCHC RBC-ENTMCNC: 31 GM/DL — LOW (ref 32–36)
MCV RBC AUTO: 88.1 FL — SIGNIFICANT CHANGE UP (ref 80–100)
NRBC # BLD: 0 /100 WBCS — SIGNIFICANT CHANGE UP (ref 0–0)
PLATELET # BLD AUTO: 297 K/UL — SIGNIFICANT CHANGE UP (ref 150–400)
POTASSIUM SERPL-MCNC: 3.8 MMOL/L — SIGNIFICANT CHANGE UP (ref 3.5–5.3)
POTASSIUM SERPL-SCNC: 3.8 MMOL/L — SIGNIFICANT CHANGE UP (ref 3.5–5.3)
RBC # BLD: 4.62 M/UL — SIGNIFICANT CHANGE UP (ref 4.2–5.8)
RBC # FLD: 12.8 % — SIGNIFICANT CHANGE UP (ref 10.3–14.5)
SODIUM SERPL-SCNC: 141 MMOL/L — SIGNIFICANT CHANGE UP (ref 135–145)
SPECIMEN SOURCE: SIGNIFICANT CHANGE UP
WBC # BLD: 8.18 K/UL — SIGNIFICANT CHANGE UP (ref 3.8–10.5)
WBC # FLD AUTO: 8.18 K/UL — SIGNIFICANT CHANGE UP (ref 3.8–10.5)

## 2019-11-16 PROCEDURE — 99233 SBSQ HOSP IP/OBS HIGH 50: CPT | Mod: GC

## 2019-11-16 RX ADMIN — Medication 3 MILLILITER(S): at 07:05

## 2019-11-16 RX ADMIN — Medication 3 MILLILITER(S): at 19:30

## 2019-11-16 RX ADMIN — PANTOPRAZOLE SODIUM 40 MILLIGRAM(S): 20 TABLET, DELAYED RELEASE ORAL at 06:20

## 2019-11-16 RX ADMIN — ATORVASTATIN CALCIUM 20 MILLIGRAM(S): 80 TABLET, FILM COATED ORAL at 21:25

## 2019-11-16 RX ADMIN — Medication 30 MILLILITER(S): at 21:27

## 2019-11-16 RX ADMIN — LIDOCAINE 1 PATCH: 4 CREAM TOPICAL at 19:00

## 2019-11-16 RX ADMIN — Medication 1000 MILLIGRAM(S): at 13:23

## 2019-11-16 RX ADMIN — Medication 1000 MILLIGRAM(S): at 01:31

## 2019-11-16 RX ADMIN — Medication 3 MILLILITER(S): at 14:58

## 2019-11-16 RX ADMIN — ENOXAPARIN SODIUM 40 MILLIGRAM(S): 100 INJECTION SUBCUTANEOUS at 12:18

## 2019-11-16 RX ADMIN — Medication 1000 MILLIGRAM(S): at 00:03

## 2019-11-16 RX ADMIN — LIDOCAINE 1 PATCH: 4 CREAM TOPICAL at 12:18

## 2019-11-16 RX ADMIN — Medication 3 MILLIGRAM(S): at 00:04

## 2019-11-16 RX ADMIN — Medication 100 MILLIGRAM(S): at 06:20

## 2019-11-16 NOTE — PROGRESS NOTE ADULT - SUBJECTIVE AND OBJECTIVE BOX
All interim records and events noted.    no sig SOB, up in chair  family present      MEDICATIONS  (STANDING):  albuterol/ipratropium for Nebulization 3 milliLiter(s) Nebulizer every 8 hours  atorvastatin 20 milliGRAM(s) Oral at bedtime  enoxaparin Injectable 40 milliGRAM(s) SubCutaneous daily  lidocaine   Patch 1 Patch Transdermal daily  metoprolol succinate  milliGRAM(s) Oral daily  pantoprazole    Tablet 40 milliGRAM(s) Oral before breakfast    MEDICATIONS  (PRN):  acetaminophen   Tablet .. 1000 milliGRAM(s) Oral every 6 hours PRN Temp greater or equal to 38C (100.4F), Mild Pain (1 - 3)  aluminum hydroxide/magnesium hydroxide/simethicone Suspension 30 milliLiter(s) Oral every 4 hours PRN Dyspepsia  guaiFENesin   Syrup  (Sugar-Free) 200 milliGRAM(s) Oral every 6 hours PRN Cough  melatonin 3 milliGRAM(s) Oral at bedtime PRN Insomnia      Vital Signs Last 24 Hrs  T(C): 36.6 (16 Nov 2019 11:47), Max: 36.7 (16 Nov 2019 04:48)  T(F): 97.9 (16 Nov 2019 11:47), Max: 98.1 (16 Nov 2019 04:48)  HR: 94 (16 Nov 2019 11:47) (81 - 104)  BP: 116/80 (16 Nov 2019 11:47) (102/67 - 159/89)  BP(mean): --  RR: 18 (16 Nov 2019 11:47) (17 - 18)  SpO2: 94% (16 Nov 2019 11:47) (83% - 97%)    PHYSICAL EXAM  General: well developed  well nourished, in no acute distress  Head: atraumatic, normocephalic  ENT: sclera anicteric, buccal mucosa moist  Neck: supple, trachea midline  CV: S1 S2, regular rate and rhythm  Lungs: clear to auscultation, no wheezes/rhonchi  Abdomen: soft, nontender, bowel sounds present, no palpable masses, pouchy  Skin: no significant increased ecchymosis/petechiae  Neuro: alert and oriented X3,  no focal deficits      LABS:             12.6   8.18  )-----------( 297      ( 11-16 @ 08:01 )             40.7                12.9   9.59  )-----------( 390      ( 11-15 @ 06:01 )             41.3                12.9   9.10  )-----------( 373      ( 11-14 @ 16:30 )             40.2       11-16    141  |  105  |  18  ----------------------------<  191<H>  3.8   |  28  |  0.86    Ca    9.6      16 Nov 2019 09:37    TPro  6.6  /  Alb  2.2<L>  /  TBili  0.4  /  DBili  x   /  AST  42<H>  /  ALT  33  /  AlkPhos  78  11-15    11-14 @ 16:30  PT15.5 INR1.36  PTT27.6      RADIOLOGY & ADDITIONAL STUDIES:    IMPRESSION/RECOMMENDATIONS:

## 2019-11-16 NOTE — PROGRESS NOTE ADULT - SUBJECTIVE AND OBJECTIVE BOX
Leonardo was seen in the [facility] for instructions on infant CPR and choking infant. He was attentive to the information that was given and asked questions about the process of both CPR and choking infant. He demonstrated both CPR and the steps to use when a baby is choking and also for an unconscious baby. We also reviewed over the safety packet of information that he was given to take with him.    Patient is a 89y old  Male who presents with a chief complaint of SOB (16 Nov 2019 11:53)      HPI:  89yo M, w/ PMH/o HTN, HLD, carotid artery disease, heart block s/p PPM, h/o TIAs (last episode ~5yrs ago), h/o prostate cancer, h/o diverticulitis, gastritis, c/o shortness of breath and recurring fevers. Pt was recently discharged from [place] on 11/12. 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 Thoracentesis was performed with removal of 1885cc of exudative fluid. Bronchoscopy was also performed. Since he returned home, he developed fevers, Tmax 101.3 last night, a/w chills and sweats that do respond to Tylenol but always return. Additionally, pt c/o productive cough, at first blood-tinged the first day at home, which the pt attributes to the bronchoscopy procedure, though sputum now nonbloody. Pt expresses SOB is interfering even with his most menial daily tasks and feels dyspneic at rest also. Denies palpitations, abdominal pain, N/V/D, urinary symptoms, new myalgia/arthralgia.    admitted with  rt   lung  mass  recurrent rt side   malignant pleural effusion   s/p  ir guided biopsy of lung mass  -  .     INTERVAL HPI/OVERNIGHT EVENTS:  pt seen and examine today-  insist to go home but ra pulse rest 83 / on 2lit nc 95 , no resp distress , tolreating po .       INTERVAL HPI/OVERNIGHT EVENTS:  T(C): 36.8 (11-16-19 @ 15:42), Max: 36.8 (11-16-19 @ 15:42)  HR: 93 (11-16-19 @ 15:42) (81 - 104)  BP: 107/72 (11-16-19 @ 15:42) (102/67 - 159/89)  RR: 18 (11-16-19 @ 15:42) (17 - 18)  SpO2: 95% (11-16-19 @ 15:42) (83% - 97%)  Wt(kg): --  I&O's Summary    15 Nov 2019 07:01  -  16 Nov 2019 07:00  --------------------------------------------------------  IN: 0 mL / OUT: 200 mL / NET: -200 mL    16 Nov 2019 07:01  -  16 Nov 2019 17:10  --------------------------------------------------------  IN: 240 mL / OUT: 0 mL / NET: 240 mL    IN: 50 mL / OUT: 400 mL / NET: -350 mL    REVIEW OF SYSTEMS:  CONSTITUTIONAL:  +  fatigue. Denies weight loss  RESPIRATORY: Admits shortness of breath. No cough, wheezing, chills   CARDIOVASCULAR: No chest pain, palpitations, dizziness, or leg swelling  GASTROINTESTINAL: No abdominal or epigastric pain. No nausea, vomiting,   ; No diarrhea or constipation. No melena .  GENITOURINARY: No dysuria, frequency, hematuria, or incontinence  NEUROLOGICAL: No headaches, memory loss, loss of strength, numbness, or tremors  MUSCULOSKELETAL: No joint pain or swelling; No muscle, back, or extremity pain    PHYSICAL EXAM:  GENERAL: NAD, well-groomed, well-developed  HEAD:  Atraumatic, Normocephalic  EYES: EOMI, PERRLA, conjunctiva and sclera clear  ENMT:  Moist mucous membranes  NECK: Supple, No JVD  NERVOUS SYSTEM:  No focal motor or sensory deficits  CHEST/LUNG: Decreased breath sounds bilaterally at bases ; No rales, rhonchi, wheezing,   HEART: Regular rate and rhythm; No murmurs, no tachy   ABDOMEN: Soft, Nontender, Nondistended  EXTREMITIES:  2+ Peripheral Pulses, No clubbing, cyanosis, or edema          MEDICATIONS  (STANDING):  albuterol/ipratropium for Nebulization 3 milliLiter(s) Nebulizer every 8 hours  atorvastatin 20 milliGRAM(s) Oral at bedtime  enoxaparin Injectable 40 milliGRAM(s) SubCutaneous daily  lidocaine   Patch 1 Patch Transdermal daily  metoprolol succinate  milliGRAM(s) Oral daily  pantoprazole    Tablet 40 milliGRAM(s) Oral before breakfast    MEDICATIONS  (PRN):  acetaminophen   Tablet .. 1000 milliGRAM(s) Oral every 6 hours PRN Temp greater or equal to 38C (100.4F), Mild Pain (1 - 3)  aluminum hydroxide/magnesium hydroxide/simethicone Suspension 30 milliLiter(s) Oral every 4 hours PRN Dyspepsia  guaiFENesin   Syrup  (Sugar-Free) 200 milliGRAM(s) Oral every 6 hours PRN Cough  melatonin 3 milliGRAM(s) Oral at bedtime PRN Insomnia      LABS:                        12.6   8.18  )-----------( 297      ( 16 Nov 2019 08:01 )             40.7     11-16    141  |  105  |  18  ----------------------------<  191<H>  3.8   |  28  |  0.86    Ca    9.6      16 Nov 2019 09:37    TPro  6.6  /  Alb  2.2<L>  /  TBili  0.4  /  DBili  x   /  AST  42<H>  /  ALT  33  /  AlkPhos  78  11-15        CAPILLARY BLOOD GLUCOSE          11-14 @ 21:30   No growth to date.  --  --          RADIOLOGY & ADDITIONAL TESTS:    Imaging Personally Reviewed:   no new test     Advance Directives:  full code

## 2019-11-16 NOTE — PROGRESS NOTE ADULT - PROBLEM SELECTOR PLAN 2
- Tmax 101.3 at home, afebrile now.   suspected cancer-related fevers. Concern for post-obstructive pna low  - Tylenol 650mg q6h prn for fever  - S/p 1x Zosyn in the ED  - Monitor fever off abx for now. If pt spikes fever, consider restarting Zosyn and giving 1 dose of Vancomycin  - pro michelle .06, lactate 1.6, flu A/B and RSV neg  - Monitor fever curve and daily CBC

## 2019-11-16 NOTE — PROGRESS NOTE ADULT - SUBJECTIVE AND OBJECTIVE BOX
The patient was interviewed and evaluated  89y Male s/p lung biopsy, sitting comfortably in chair on room air    T(C): 36.6 (11-16-19 @ 08:27), Max: 36.7 (11-16-19 @ 04:48)  HR: 92 (11-16-19 @ 08:27) (81 - 98)  BP: 145/82 (11-16-19 @ 08:27) (102/67 - 159/89)  RR: 18 (11-16-19 @ 08:27) (17 - 18)  SpO2: 94% (11-16-19 @ 08:27) (94% - 97%)  Wt(kg): --    Pt seen, doing well, no anesthesia complications or complaints noted or reported.   No Nausea    All questions answered    No additional recommendations.     Pain well controlled

## 2019-11-16 NOTE — PROGRESS NOTE ADULT - PROBLEM SELECTOR PLAN 5
- H/o TIAs, last episode approx. 5 years ago  - Hold plavix for possible pleurocentesis until Monday decision   - Continue statin daily

## 2019-11-16 NOTE — PROGRESS NOTE ADULT - PROBLEM SELECTOR PLAN 3
- Lactate 2.2, s/p 1L in the ED. Repeat 1.6  - Likely 2/2 possible malignancy though infiltrate not excluded on CXR  - Pt with recurrent fevers though no leukocytosis  - Started on Zosyn in the ED, monitor off abx for now- blood cult neg   - Monitor fever curve and daily CBC

## 2019-11-16 NOTE — PROGRESS NOTE ADULT - PROBLEM SELECTOR PLAN 1
new  rt lung mass, s/p bronchoscopy and thoracentesis, path pending  - CTA showing increase in size of right pleural effusion  - S/p IR transthoracic lung mass biopsy- possible malignancy   - Duonebs every 8 hours  - Continue incentive spirometry  - Tylenol 650mg q6h prn for fever  - Robitussin q6h prn for cough  - Morphine prn for air hunger  - Monitor continuous pulse ox  - Pulmonology, Dr. Swartz consulted   -Heme onc, Dr. Tidwell consulted

## 2019-11-16 NOTE — PROGRESS NOTE ADULT - PROBLEM SELECTOR PLAN 1
lung mass  pleural eff  s/p Lung Bx  Onc eval noted  path pending  Immunohistochemistry from pleural fluid analysis pending  on o2 support - wean if tolerated - check sat on exertion on room air  on NEBS for Mucociliary clearance  dc planning - pt wishes to go home - may need home 0 2 support  at risk for re - accumulation of pleural eff - and need for drainage in the future - Thoracentesis vs Pleurx -   discussed importance of follow up with Dr. Amado and Dr. Tidwell in the office  discussed details of work up and future course  most likely malignant mass with para malignant eff - vs malignant eff -

## 2019-11-16 NOTE — PROGRESS NOTE ADULT - ASSESSMENT
89 y/o man w symptomatic right effusion, w/u also sig for 7+cm RUL mass inseparable from mediastinum. Cytology from fluid negative for malignancy, post IR bx of lung mass yesterday.    clinically stable at this time, continue serial monitoring, agree w plan to monitor for rapid reaccumulation of pleural fluid as had occurred post previous thoracentesis, may need pigtail drainage if rapid reaccumulation again.    discussed w family and pt as well

## 2019-11-16 NOTE — PROGRESS NOTE ADULT - ASSESSMENT
89yo M, w/ PMH/o HTN, HLD, carotid artery disease, heart block s/p PPM, h/o TIAs (last episode ~5yrs ago), h/o prostate cancer, h/o diverticulitis, gastritis, presented with shortness of breath, productive cough, and recurring fevers. In setting of recent hospitalization where work up revealed a RUL mass. Symptoms are likely 2/2 malignancy.   Admitted rt   lung  mass with  recurrent  malignant rt side pleural effusion .

## 2019-11-16 NOTE — PROGRESS NOTE ADULT - SUBJECTIVE AND OBJECTIVE BOX
Date/Time Patient Seen:  		  Referring MD:   Data Reviewed	       Patient is a 89y old  Male who presents with a chief complaint of SOB (16 Nov 2019 09:36)      Subjective/HPI     PAST MEDICAL & SURGICAL HISTORY:  Pacemaker: 2007  Diverticulitis  TIA (transient ischemic attack): last TIA over 5 years ago as per patient  Prostate cancer  Carpal tunnel syndrome  HTN (hypertension)  Hyperlipidemia  GERD (gastroesophageal reflux disease)  Heart block  History of cataract surgery: bilateral  S/P cholecystectomy  Pacemaker: [employer] Model # [id number] Serial #[id number]  implant date 05/20/2011        Medication list         MEDICATIONS  (STANDING):  albuterol/ipratropium for Nebulization 3 milliLiter(s) Nebulizer every 8 hours  atorvastatin 20 milliGRAM(s) Oral at bedtime  enoxaparin Injectable 40 milliGRAM(s) SubCutaneous daily  lidocaine   Patch 1 Patch Transdermal daily  metoprolol succinate  milliGRAM(s) Oral daily  pantoprazole    Tablet 40 milliGRAM(s) Oral before breakfast    MEDICATIONS  (PRN):  acetaminophen   Tablet .. 1000 milliGRAM(s) Oral every 6 hours PRN Temp greater or equal to 38C (100.4F), Mild Pain (1 - 3)  aluminum hydroxide/magnesium hydroxide/simethicone Suspension 30 milliLiter(s) Oral every 4 hours PRN Dyspepsia  guaiFENesin   Syrup  (Sugar-Free) 200 milliGRAM(s) Oral every 6 hours PRN Cough  melatonin 3 milliGRAM(s) Oral at bedtime PRN Insomnia         Vitals log        ICU Vital Signs Last 24 Hrs  T(C): 36.6 (16 Nov 2019 08:27), Max: 36.7 (16 Nov 2019 04:48)  T(F): 97.9 (16 Nov 2019 08:27), Max: 98.1 (16 Nov 2019 04:48)  HR: 92 (16 Nov 2019 08:27) (81 - 98)  BP: 145/82 (16 Nov 2019 08:27) (102/67 - 159/89)  BP(mean): --  ABP: --  ABP(mean): --  RR: 18 (16 Nov 2019 08:27) (17 - 18)  SpO2: 94% (16 Nov 2019 08:27) (94% - 97%)           Input and Output:  I&O's Detail    15 Nov 2019 07:01  -  16 Nov 2019 07:00  --------------------------------------------------------  IN:  Total IN: 0 mL    OUT:    Voided: 200 mL  Total OUT: 200 mL    Total NET: -200 mL          Lab Data                        12.6   8.18  )-----------( 297      ( 16 Nov 2019 08:01 )             40.7     11-16    141  |  105  |  18  ----------------------------<  191<H>  3.8   |  28  |  0.86    Ca    9.6      16 Nov 2019 09:37    TPro  6.6  /  Alb  2.2<L>  /  TBili  0.4  /  DBili  x   /  AST  42<H>  /  ALT  33  /  AlkPhos  78  11-15      CARDIAC MARKERS ( 14 Nov 2019 16:30 )  <.015 ng/mL / x     / 42 U/L / x     / x            Review of Systems	      Objective     Physical Examination    heart s1s2  lung dc BS  abd soft  on o2 support  cn grossly int  head nc  abd soft      Pertinent Lab findings & Imaging      Sherly:  NO   Adequate UO     I&O's Detail    15 Nov 2019 07:01  -  16 Nov 2019 07:00  --------------------------------------------------------  IN:  Total IN: 0 mL    OUT:    Voided: 200 mL  Total OUT: 200 mL    Total NET: -200 mL               Discussed with:     Cultures:	        Radiology

## 2019-11-17 PROCEDURE — 99233 SBSQ HOSP IP/OBS HIGH 50: CPT | Mod: GC

## 2019-11-17 RX ORDER — IPRATROPIUM/ALBUTEROL SULFATE 18-103MCG
3 AEROSOL WITH ADAPTER (GRAM) INHALATION ONCE
Refills: 0 | Status: COMPLETED | OUTPATIENT
Start: 2019-11-17 | End: 2019-11-17

## 2019-11-17 RX ADMIN — LIDOCAINE 1 PATCH: 4 CREAM TOPICAL at 13:20

## 2019-11-17 RX ADMIN — Medication 100 MILLIGRAM(S): at 05:09

## 2019-11-17 RX ADMIN — ATORVASTATIN CALCIUM 20 MILLIGRAM(S): 80 TABLET, FILM COATED ORAL at 21:27

## 2019-11-17 RX ADMIN — Medication 3 MILLIGRAM(S): at 21:30

## 2019-11-17 RX ADMIN — PANTOPRAZOLE SODIUM 40 MILLIGRAM(S): 20 TABLET, DELAYED RELEASE ORAL at 05:09

## 2019-11-17 RX ADMIN — Medication 1000 MILLIGRAM(S): at 06:41

## 2019-11-17 RX ADMIN — Medication 30 MILLILITER(S): at 06:41

## 2019-11-17 RX ADMIN — Medication 3 MILLILITER(S): at 05:46

## 2019-11-17 RX ADMIN — Medication 1000 MILLIGRAM(S): at 21:31

## 2019-11-17 RX ADMIN — Medication 3 MILLILITER(S): at 19:09

## 2019-11-17 RX ADMIN — Medication 3 MILLILITER(S): at 15:11

## 2019-11-17 RX ADMIN — LIDOCAINE 1 PATCH: 4 CREAM TOPICAL at 00:00

## 2019-11-17 RX ADMIN — Medication 3 MILLILITER(S): at 07:13

## 2019-11-17 NOTE — PROGRESS NOTE ADULT - PROBLEM SELECTOR PLAN 2
- Tmax 101.3 at home, afebrile now.   suspected cancer-related fevers. Concern for post-obstructive pna ruled out this time.   - Tylenol 650mg q6h prn for fever  - S/p 1x Zosyn in the ED  - Monitor fever off abx for now. If pt spikes fever, consider restarting Zosyn and giving 1 dose of Vancomycin  - pro michelle .06, lactate 1.6, flu A/B and RSV neg  - Monitor fever curve and daily CBC

## 2019-11-17 NOTE — PROGRESS NOTE ADULT - SUBJECTIVE AND OBJECTIVE BOX
All interim records and events noted.    up in chair, reports sig orthopnea since last night, fine when sitting up in chair  comfortable at present, on O2 NC      MEDICATIONS  (STANDING):  albuterol/ipratropium for Nebulization 3 milliLiter(s) Nebulizer every 8 hours  atorvastatin 20 milliGRAM(s) Oral at bedtime  enoxaparin Injectable 40 milliGRAM(s) SubCutaneous daily  lidocaine   Patch 1 Patch Transdermal daily  metoprolol succinate  milliGRAM(s) Oral daily  pantoprazole    Tablet 40 milliGRAM(s) Oral before breakfast    MEDICATIONS  (PRN):  acetaminophen   Tablet .. 1000 milliGRAM(s) Oral every 6 hours PRN Temp greater or equal to 38C (100.4F), Mild Pain (1 - 3)  aluminum hydroxide/magnesium hydroxide/simethicone Suspension 30 milliLiter(s) Oral every 4 hours PRN Dyspepsia  guaiFENesin   Syrup  (Sugar-Free) 200 milliGRAM(s) Oral every 6 hours PRN Cough  melatonin 3 milliGRAM(s) Oral at bedtime PRN Insomnia      Vital Signs Last 24 Hrs  T(C): 36.4 (17 Nov 2019 11:40), Max: 36.9 (16 Nov 2019 23:47)  T(F): 97.5 (17 Nov 2019 11:40), Max: 98.5 (16 Nov 2019 23:47)  HR: 92 (17 Nov 2019 11:40) (92 - 112)  BP: 102/68 (17 Nov 2019 11:40) (102/68 - 149/84)  BP(mean): --  RR: 17 (17 Nov 2019 11:40) (17 - 18)  SpO2: 94% (17 Nov 2019 11:40) (94% - 100%)    PHYSICAL EXAM  General: well developed  well nourished, in no acute distress  Head: atraumatic, normocephalic  ENT: sclera anicteric, buccal mucosa moist  Neck: supple, trachea midline  CV: S1 S2, regular rate and rhythm  Lungs: clear to auscultation, no wheezes/rhonchi, decr breath sounds right lung field  Abdomen: soft, nontender, bowel sounds present, no palpable masses, pouchy  Skin: no significant increased ecchymosis/petechiae  Neuro: alert and oriented X3,  no focal deficits      LABS:             12.6   8.18  )-----------( 297      ( 11-16 @ 08:01 )             40.7                12.9   9.59  )-----------( 390      ( 11-15 @ 06:01 )             41.3                12.9   9.10  )-----------( 373      ( 11-14 @ 16:30 )             40.2       11-16    141  |  105  |  18  ----------------------------<  191<H>  3.8   |  28  |  0.86    Ca    9.6      16 Nov 2019 09:37      11-14 @ 16:30  PT15.5 INR1.36  PTT27.6      RADIOLOGY & ADDITIONAL STUDIES:    IMPRESSION/RECOMMENDATIONS:

## 2019-11-17 NOTE — PROGRESS NOTE ADULT - SUBJECTIVE AND OBJECTIVE BOX
Patient is a 89y old  Male who presents with a chief complaint of SOB (17 Nov 2019 11:50)      HPI:  89yo M, w/ PMH/o HTN, HLD, carotid artery disease, heart block s/p PPM, h/o TIAs (last episode ~5yrs ago), h/o prostate cancer, h/o diverticulitis, gastritis, c/o shortness of breath and recurring fevers. Pt was recently discharged from [facility] on 11/12. 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 Thoracentesis was performed with removal of 1885cc of exudative fluid. Bronchoscopy was also performed. Since he returned home, he developed fevers, Tmax 101.3 last night, a/w chills and sweats that do respond to Tylenol but always return. Additionally, pt c/o productive cough, at first blood-tinged the first day at home, which the pt attributes to the bronchoscopy procedure, though sputum now nonbloody. Pt expresses SOB is interfering even with his most menial daily tasks and feels dyspneic at rest also. Denies palpitations, abdominal pain, N/V/D, urinary symptoms, new myalgia/arthralgia.    admitted with  rt   lung  mass  recurrent rt side   malignant pleural effusion   s/p  ir guided biopsy of lung mass  -  .     INTERVAL HPI/OVERNIGHT EVENTS:  pt seen and examine today-    alert awake , c/o sob , no cp ,   no fever  today  ,  tolerating po .         INTERVAL HPI/OVERNIGHT EVENTS:  T(C): 36.4 (11-17-19 @ 11:40), Max: 36.9 (11-16-19 @ 23:47)  HR: 92 (11-17-19 @ 11:40) (92 - 112)  BP: 102/68 (11-17-19 @ 11:40) (102/68 - 149/84)  RR: 17 (11-17-19 @ 11:40) (17 - 18)  SpO2: 94% (11-17-19 @ 11:40) (94% - 100%)  Wt(kg): --  I&O's Summary    16 Nov 2019 07:01  -  17 Nov 2019 07:00  --------------------------------------------------------  IN: 240 mL / OUT: 0 mL / NET: 240 mL        REVIEW OF SYSTEMS:  CONSTITUTIONAL: No fever, weight loss,  + fatigue  EYES: No eye pain, visual disturbances, or discharge  ENMT:No sinus or throat pain  NECK: No pain or stiffness  RESPIRATORY: No cough, wheezing, +  shortness of breath  CARDIOVASCULAR: No chest pain, palpitations, dizziness, or leg swelling  GASTROINTESTINAL: No abdominal or epigastric pain. No nausea, vomiting,  No diarrhea or constipation. No melena   GENITOURINARY: No dysuria, frequency, hematuria, or incontinence  NEUROLOGICAL: No headaches, memory loss, loss of strength, numbness, or tremors  SKIN: No itching, burning, rashes, or lesions   MUSCULOSKELETAL: No joint pain or swelling; No muscle, back, or extremity pain      PHYSICAL EXAM:  GENERAL: NAD, well-groomed, well-developed  HEAD:  Atraumatic, Normocephalic  EYES: EOMI, PERRLA, conjunctiva and sclera clear  ENMT: ; Moist mucous membranes, No lesions  NECK: Supple, No JVD,  NERVOUS SYSTEM:  Alert & Oriented X3, Good concentration; Motor Strength 5/5 B/L upper and lower extremities; DTRs 2+ intact and symmetric  CHEST/LUNG percussion bilaterally  rt lobe decrease  air entry with  rale  +,   lt  side No rales, rhonchi, wheezing,  HEART: Regular rate and rhythm; No murmurs,  no tachy   ABDOMEN: Soft, Nontender, Nondistended; Bowel sounds present  EXTREMITIES:  2+ Peripheral Pulses, No clubbing, cyanosis, or edema  SKIN: No rashes or lesions    MEDICATIONS  (STANDING):  albuterol/ipratropium for Nebulization 3 milliLiter(s) Nebulizer every 8 hours  atorvastatin 20 milliGRAM(s) Oral at bedtime  enoxaparin Injectable 40 milliGRAM(s) SubCutaneous daily  lidocaine   Patch 1 Patch Transdermal daily  metoprolol succinate  milliGRAM(s) Oral daily  pantoprazole    Tablet 40 milliGRAM(s) Oral before breakfast    MEDICATIONS  (PRN):  acetaminophen   Tablet .. 1000 milliGRAM(s) Oral every 6 hours PRN Temp greater or equal to 38C (100.4F), Mild Pain (1 - 3)  aluminum hydroxide/magnesium hydroxide/simethicone Suspension 30 milliLiter(s) Oral every 4 hours PRN Dyspepsia  guaiFENesin   Syrup  (Sugar-Free) 200 milliGRAM(s) Oral every 6 hours PRN Cough  melatonin 3 milliGRAM(s) Oral at bedtime PRN Insomnia      LABS:                        12.6   8.18  )-----------( 297      ( 16 Nov 2019 08:01 )             40.7     11-16    141  |  105  |  18  ----------------------------<  191<H>  3.8   |  28  |  0.86    Ca    9.6      16 Nov 2019 09:37          CAPILLARY BLOOD GLUCOSE          11-14 @ 21:30   No growth to date.  --  --          RADIOLOGY & ADDITIONAL TESTS:    Imaging Personally Reviewed:   no new test     Advance Directives:  full code    Palliative Care:    appropraite

## 2019-11-17 NOTE — PROGRESS NOTE ADULT - ASSESSMENT
89yo M, w/ PMH/o HTN, HLD, carotid artery disease, heart block s/p PPM, h/o TIAs (last episode ~5yrs ago), h/o prostate cancer, h/o diverticulitis, gastritis, presented with shortness of breath, productive cough, and recurring fevers. In setting of recent hospitalization where work up revealed a RUL mass. Symptoms are likely 2/2 malignancy.   Admitted rt   lung  mass with  recurrent  malignant rt side pleural effusion .

## 2019-11-17 NOTE — PROGRESS NOTE ADULT - PROBLEM SELECTOR PLAN 1
new  rt lung mass -    s/p biopsy  pending report   , s/p bronchoscopy and thoracentesis  fluid  path was neg before .   - CTA showing increase in size of right pleural effusion  - S/p IR transthoracic lung mass biopsy- possible malignancy  wait for path   - Duonebs every 8 hours  - Continue incentive spirometry  - Tylenol 650mg q6h prn for fever  - Robitussin q6h prn for cough  - Morphine prn for air hunger  - Monitor continuous pulse ox  - Pulmonology, Dr. Swartz consulted   -Heme onc, Dr. Tidwell consulted

## 2019-11-17 NOTE — PROGRESS NOTE ADULT - SUBJECTIVE AND OBJECTIVE BOX
Date/Time Patient Seen:  		  Referring MD:   Data Reviewed	       Patient is a 89y old  Male who presents with a chief complaint of SOB (16 Nov 2019 17:10)      Subjective/HPI     PAST MEDICAL & SURGICAL HISTORY:  Pacemaker: 2007  Diverticulitis  TIA (transient ischemic attack): last TIA over 5 years ago as per patient  Prostate cancer  Carpal tunnel syndrome  HTN (hypertension)  Hyperlipidemia  GERD (gastroesophageal reflux disease)  Heart block  History of cataract surgery: bilateral  S/P cholecystectomy  Pacemaker: [employer] Model # [id number] Serial #[id number]  implant date 05/20/2011        Medication list         MEDICATIONS  (STANDING):  albuterol/ipratropium for Nebulization 3 milliLiter(s) Nebulizer every 8 hours  atorvastatin 20 milliGRAM(s) Oral at bedtime  enoxaparin Injectable 40 milliGRAM(s) SubCutaneous daily  lidocaine   Patch 1 Patch Transdermal daily  metoprolol succinate  milliGRAM(s) Oral daily  pantoprazole    Tablet 40 milliGRAM(s) Oral before breakfast    MEDICATIONS  (PRN):  acetaminophen   Tablet .. 1000 milliGRAM(s) Oral every 6 hours PRN Temp greater or equal to 38C (100.4F), Mild Pain (1 - 3)  aluminum hydroxide/magnesium hydroxide/simethicone Suspension 30 milliLiter(s) Oral every 4 hours PRN Dyspepsia  guaiFENesin   Syrup  (Sugar-Free) 200 milliGRAM(s) Oral every 6 hours PRN Cough  melatonin 3 milliGRAM(s) Oral at bedtime PRN Insomnia         Vitals log        ICU Vital Signs Last 24 Hrs  T(C): 36.6 (17 Nov 2019 08:07), Max: 36.9 (16 Nov 2019 23:47)  T(F): 97.9 (17 Nov 2019 08:07), Max: 98.5 (16 Nov 2019 23:47)  HR: 96 (17 Nov 2019 08:07) (92 - 112)  BP: 109/73 (17 Nov 2019 08:07) (107/72 - 149/84)  BP(mean): --  ABP: --  ABP(mean): --  RR: 18 (17 Nov 2019 08:07) (17 - 18)  SpO2: 95% (17 Nov 2019 08:07) (83% - 100%)           Input and Output:  I&O's Detail    16 Nov 2019 07:01  -  17 Nov 2019 07:00  --------------------------------------------------------  IN:    Oral Fluid: 240 mL  Total IN: 240 mL    OUT:  Total OUT: 0 mL    Total NET: 240 mL          Lab Data                        12.6   8.18  )-----------( 297      ( 16 Nov 2019 08:01 )             40.7     11-16    141  |  105  |  18  ----------------------------<  191<H>  3.8   |  28  |  0.86    Ca    9.6      16 Nov 2019 09:37              Review of Systems	      Objective     Physical Examination    heart s1s2  lung dec BS  abd soft  cn grossly int      Pertinent Lab findings & Imaging      Sherly:  NO   Adequate UO     I&O's Detail    16 Nov 2019 07:01  -  17 Nov 2019 07:00  --------------------------------------------------------  IN:    Oral Fluid: 240 mL  Total IN: 240 mL    OUT:  Total OUT: 0 mL    Total NET: 240 mL               Discussed with:     Cultures:	        Radiology

## 2019-11-17 NOTE — PROGRESS NOTE ADULT - PROBLEM SELECTOR PLAN 3
- Lactate 2.2, s/p 1L in the ED. Repeat 1.6  - Pt with recurrent fevers  sec to possible  malignancy  - resolved    no leukocytosis , no infection so far noticed   - Started on Zosyn in the ED, monitor off abx for now- blood cult neg   - Monitor daily CBC

## 2019-11-17 NOTE — PROGRESS NOTE ADULT - PROBLEM SELECTOR PLAN 1
lung mass and effusion  s/p Lung Bx  s/p Pleurocentesis  path pending  suspect malignancy  Onc following  will arrange for IR thoracentesis in am for repeat thoracentesis - eff - accumulation   nebs and cough rx regimen  pain rx regimen  I and O  supportive care  reassurance and emotional support  will follow  on o2 support - will likely need home o2 support  discussed plan of care with patient

## 2019-11-17 NOTE — PROGRESS NOTE ADULT - ASSESSMENT
89 y/o man w symptomatic right effusion, w/u also sig for 7+cm RUL mass inseparable from mediastinum. Cytology from fluid negative for malignancy, post IR bx of lung mass Friday.    -incr orthopnea, w decr breath sounds right lung field, ?again rapid reaccumlation of fluid. Agree w plan to assess for need of pigtail catheter for contiuous effusion drainage.  -followup IR lung bx results.  -discussed overal findings, events, significance w pt and son in law, questions answered. 89 y/o man w symptomatic right effusion, w/u also sig for 7+cm RUL mass inseparable from mediastinum. Cytology from fluid negative for malignancy, post IR bx of lung mass Friday.    -incr orthopnea, w decr breath sounds right lung field, ?again rapid reaccumlation of fluid. Agree w plan to assess for need of pigtail catheter for contiuous effusion drainage.  -followup IR lung bx results.  -discussed overall findings, events, significance w pt and son in law, questions answered.

## 2019-11-18 ENCOUNTER — RESULT REVIEW (OUTPATIENT)
Age: 84
End: 2019-11-18

## 2019-11-18 LAB
ALBUMIN FLD-MCNC: 2 G/DL — SIGNIFICANT CHANGE UP
ANION GAP SERPL CALC-SCNC: 5 MMOL/L — SIGNIFICANT CHANGE UP (ref 5–17)
B PERT IGG+IGM PNL SER: ABNORMAL
BASOPHILS # BLD AUTO: 0.03 K/UL — SIGNIFICANT CHANGE UP (ref 0–0.2)
BASOPHILS NFR BLD AUTO: 0.4 % — SIGNIFICANT CHANGE UP (ref 0–2)
BUN SERPL-MCNC: 20 MG/DL — SIGNIFICANT CHANGE UP (ref 7–23)
CALCIUM SERPL-MCNC: 9.7 MG/DL — SIGNIFICANT CHANGE UP (ref 8.5–10.1)
CHLORIDE SERPL-SCNC: 102 MMOL/L — SIGNIFICANT CHANGE UP (ref 96–108)
CO2 SERPL-SCNC: 32 MMOL/L — HIGH (ref 22–31)
COLOR FLD: SIGNIFICANT CHANGE UP
CREAT SERPL-MCNC: 0.78 MG/DL — SIGNIFICANT CHANGE UP (ref 0.5–1.3)
EOSINOPHIL # BLD AUTO: 0.09 K/UL — SIGNIFICANT CHANGE UP (ref 0–0.5)
EOSINOPHIL NFR BLD AUTO: 1.2 % — SIGNIFICANT CHANGE UP (ref 0–6)
FLUID INTAKE SUBSTANCE CLASS: SIGNIFICANT CHANGE UP
FLUID SEGMENTED GRANULOCYTES: 2 % — SIGNIFICANT CHANGE UP
GLUCOSE FLD-MCNC: 104 MG/DL — SIGNIFICANT CHANGE UP
GLUCOSE SERPL-MCNC: 103 MG/DL — HIGH (ref 70–99)
GRAM STN FLD: SIGNIFICANT CHANGE UP
HCT VFR BLD CALC: 40.3 % — SIGNIFICANT CHANGE UP (ref 39–50)
HGB BLD-MCNC: 12.6 G/DL — LOW (ref 13–17)
IMM GRANULOCYTES NFR BLD AUTO: 0.7 % — SIGNIFICANT CHANGE UP (ref 0–1.5)
LDH SERPL L TO P-CCNC: 1299 U/L — SIGNIFICANT CHANGE UP
LDH SERPL L TO P-CCNC: 515 U/L — HIGH (ref 50–242)
LYMPHOCYTES # BLD AUTO: 1.65 K/UL — SIGNIFICANT CHANGE UP (ref 1–3.3)
LYMPHOCYTES # BLD AUTO: 21.5 % — SIGNIFICANT CHANGE UP (ref 13–44)
LYMPHOCYTES # FLD: 44 % — SIGNIFICANT CHANGE UP
MCHC RBC-ENTMCNC: 27.5 PG — SIGNIFICANT CHANGE UP (ref 27–34)
MCHC RBC-ENTMCNC: 31.3 GM/DL — LOW (ref 32–36)
MCV RBC AUTO: 88 FL — SIGNIFICANT CHANGE UP (ref 80–100)
MONOCYTES # BLD AUTO: 1.09 K/UL — HIGH (ref 0–0.9)
MONOCYTES NFR BLD AUTO: 14.2 % — HIGH (ref 2–14)
MONOS+MACROS # FLD: 54 % — SIGNIFICANT CHANGE UP
NEUTROPHILS # BLD AUTO: 4.77 K/UL — SIGNIFICANT CHANGE UP (ref 1.8–7.4)
NEUTROPHILS NFR BLD AUTO: 62 % — SIGNIFICANT CHANGE UP (ref 43–77)
NRBC # BLD: 0 /100 WBCS — SIGNIFICANT CHANGE UP (ref 0–0)
PH FLD: 7.32 — SIGNIFICANT CHANGE UP
PLATELET # BLD AUTO: 378 K/UL — SIGNIFICANT CHANGE UP (ref 150–400)
POTASSIUM SERPL-MCNC: 4.6 MMOL/L — SIGNIFICANT CHANGE UP (ref 3.5–5.3)
POTASSIUM SERPL-SCNC: 4.6 MMOL/L — SIGNIFICANT CHANGE UP (ref 3.5–5.3)
PROT FLD-MCNC: 3.9 G/DL — SIGNIFICANT CHANGE UP
RBC # BLD: 4.58 M/UL — SIGNIFICANT CHANGE UP (ref 4.2–5.8)
RBC # FLD: 12.5 % — SIGNIFICANT CHANGE UP (ref 10.3–14.5)
RCV VOL RI: 7000 /UL — HIGH (ref 0–0)
SODIUM SERPL-SCNC: 139 MMOL/L — SIGNIFICANT CHANGE UP (ref 135–145)
SPECIMEN SOURCE: SIGNIFICANT CHANGE UP
TOTAL NUCLEATED CELL COUNT, BODY FLUID: 1084 /UL — SIGNIFICANT CHANGE UP
TUBE TYPE: SIGNIFICANT CHANGE UP
WBC # BLD: 7.68 K/UL — SIGNIFICANT CHANGE UP (ref 3.8–10.5)
WBC # FLD AUTO: 7.68 K/UL — SIGNIFICANT CHANGE UP (ref 3.8–10.5)

## 2019-11-18 PROCEDURE — 32555 ASPIRATE PLEURA W/ IMAGING: CPT | Mod: RT

## 2019-11-18 PROCEDURE — 71045 X-RAY EXAM CHEST 1 VIEW: CPT | Mod: 26,77

## 2019-11-18 PROCEDURE — 88108 CYTOPATH CONCENTRATE TECH: CPT | Mod: 26

## 2019-11-18 PROCEDURE — 99233 SBSQ HOSP IP/OBS HIGH 50: CPT | Mod: GC

## 2019-11-18 PROCEDURE — 88305 TISSUE EXAM BY PATHOLOGIST: CPT | Mod: 26

## 2019-11-18 PROCEDURE — 71045 X-RAY EXAM CHEST 1 VIEW: CPT | Mod: 26

## 2019-11-18 RX ORDER — CLOPIDOGREL BISULFATE 75 MG/1
75 TABLET, FILM COATED ORAL DAILY
Refills: 0 | Status: DISCONTINUED | OUTPATIENT
Start: 2019-11-18 | End: 2019-11-19

## 2019-11-18 RX ORDER — ZOLPIDEM TARTRATE 10 MG/1
5 TABLET ORAL AT BEDTIME
Refills: 0 | Status: DISCONTINUED | OUTPATIENT
Start: 2019-11-18 | End: 2019-11-18

## 2019-11-18 RX ORDER — LANOLIN ALCOHOL/MO/W.PET/CERES
3 CREAM (GRAM) TOPICAL ONCE
Refills: 0 | Status: COMPLETED | OUTPATIENT
Start: 2019-11-18 | End: 2019-11-18

## 2019-11-18 RX ADMIN — Medication 3 MILLIGRAM(S): at 02:56

## 2019-11-18 RX ADMIN — PANTOPRAZOLE SODIUM 40 MILLIGRAM(S): 20 TABLET, DELAYED RELEASE ORAL at 05:45

## 2019-11-18 RX ADMIN — ZOLPIDEM TARTRATE 5 MILLIGRAM(S): 10 TABLET ORAL at 22:21

## 2019-11-18 RX ADMIN — Medication 1000 MILLIGRAM(S): at 02:01

## 2019-11-18 RX ADMIN — Medication 3 MILLILITER(S): at 14:07

## 2019-11-18 RX ADMIN — LIDOCAINE 1 PATCH: 4 CREAM TOPICAL at 02:02

## 2019-11-18 RX ADMIN — Medication 1000 MILLIGRAM(S): at 05:44

## 2019-11-18 RX ADMIN — Medication 100 MILLIGRAM(S): at 05:45

## 2019-11-18 RX ADMIN — Medication 3 MILLILITER(S): at 20:48

## 2019-11-18 RX ADMIN — ATORVASTATIN CALCIUM 20 MILLIGRAM(S): 80 TABLET, FILM COATED ORAL at 22:21

## 2019-11-18 RX ADMIN — Medication 1000 MILLIGRAM(S): at 04:37

## 2019-11-18 RX ADMIN — LIDOCAINE 1 PATCH: 4 CREAM TOPICAL at 02:11

## 2019-11-18 RX ADMIN — Medication 3 MILLILITER(S): at 08:07

## 2019-11-18 NOTE — PROGRESS NOTE ADULT - SUBJECTIVE AND OBJECTIVE BOX
Patient is a 89y old  Male who presents with a chief complaint of SOB (18 Nov 2019 10:53)    HPI:  89yo M, w/ PMH/o HTN, HLD, carotid artery disease, heart block s/p PPM, h/o TIAs (last episode ~5yrs ago), h/o prostate cancer, h/o diverticulitis, gastritis, c/o shortness of breath and recurring fevers. Pt was recently discharged from [place] on 11/12. 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 Thoracentesis was performed with removal of 1885cc of exudative fluid. Bronchoscopy was also performed. Since he returned home, he developed fevers, Tmax 101.3 last night, a/w chills and sweats that do respond to Tylenol but always return. Additionally, pt c/o productive cough, at first blood-tinged the first day at home, which the pt attributes to the bronchoscopy procedure, though sputum now nonbloody. Pt expresses SOB is interfering even with his most menial daily tasks and feels dyspneic at rest also. Denies palpitations, abdominal pain, N/V/D, urinary symptoms, new myalgia/arthralgia.    admitted with  rt   lung  mass  recurrent rt side   malignant pleural effusion   s/p  ir guided biopsy of lung mass  -  .       INTERVAL HPI/OVERNIGHT EVENTS: Pt seen and examined at bedside. Pt states breathing is better today, but still in need of supplemental O2(2L). Pt tolerating PO. Denies cp, n/v, abd pain, fevers/chills.    MEDICATIONS  (STANDING):  albuterol/ipratropium for Nebulization 3 milliLiter(s) Nebulizer every 8 hours  atorvastatin 20 milliGRAM(s) Oral at bedtime  enoxaparin Injectable 40 milliGRAM(s) SubCutaneous daily  lidocaine   Patch 1 Patch Transdermal daily  metoprolol succinate  milliGRAM(s) Oral daily  pantoprazole    Tablet 40 milliGRAM(s) Oral before breakfast  zolpidem 5 milliGRAM(s) Oral at bedtime    MEDICATIONS  (PRN):  acetaminophen   Tablet .. 1000 milliGRAM(s) Oral every 6 hours PRN Temp greater or equal to 38C (100.4F), Mild Pain (1 - 3)  aluminum hydroxide/magnesium hydroxide/simethicone Suspension 30 milliLiter(s) Oral every 4 hours PRN Dyspepsia  guaiFENesin   Syrup  (Sugar-Free) 200 milliGRAM(s) Oral every 6 hours PRN Cough  melatonin 3 milliGRAM(s) Oral at bedtime PRN Insomnia      Allergies    No Known Allergies    Intolerances        REVIEW OF SYSTEMS:  CONSTITUTIONAL: No fever or chills, admits fatigue  HEENT:  No headache, no sore throat  RESPIRATORY: No cough, wheezing, admits shortness of breath  CARDIOVASCULAR: No chest pain, palpitations  GASTROINTESTINAL: No abd pain, nausea, vomiting, or diarrhea  GENITOURINARY: No dysuria, frequency, or hematuria  NEUROLOGICAL: no focal weakness or dizziness  MUSCULOSKELETAL: admits muscle aches from bed and chair    Vital Signs Last 24 Hrs  T(C): 36.7 (18 Nov 2019 07:49), Max: 37 (17 Nov 2019 21:27)  T(F): 98 (18 Nov 2019 07:49), Max: 98.6 (17 Nov 2019 21:27)  HR: 86 (18 Nov 2019 08:19) (85 - 106)  BP: 108/71 (18 Nov 2019 07:49) (108/71 - 158/93)  BP(mean): --  RR: 22 (18 Nov 2019 07:49) (17 - 22)  SpO2: 96% (18 Nov 2019 08:19) (94% - 97%)    PHYSICAL EXAM:  GENERAL: NAD  HEENT:  anicteric, moist mucous membranes  CHEST/LUNG: +decreased BS b/l, no rales, wheezes, or rhonchi  HEART:  RRR, S1, S2  ABDOMEN:  BS+, soft, nontender, nondistended  EXTREMITIES: no edema, cyanosis, or calf tenderness  NERVOUS SYSTEM: answers questions and follows commands appropriately    LABS:                        12.6   7.68  )-----------( 378      ( 18 Nov 2019 06:18 )             40.3     CBC Full  -  ( 18 Nov 2019 06:18 )  WBC Count : 7.68 K/uL  Hemoglobin : 12.6 g/dL  Hematocrit : 40.3 %  Platelet Count - Automated : 378 K/uL  Mean Cell Volume : 88.0 fl  Mean Cell Hemoglobin : 27.5 pg  Mean Cell Hemoglobin Concentration : 31.3 gm/dL  Auto Neutrophil # : 4.77 K/uL  Auto Lymphocyte # : 1.65 K/uL  Auto Monocyte # : 1.09 K/uL  Auto Eosinophil # : 0.09 K/uL  Auto Basophil # : 0.03 K/uL  Auto Neutrophil % : 62.0 %  Auto Lymphocyte % : 21.5 %  Auto Monocyte % : 14.2 %  Auto Eosinophil % : 1.2 %  Auto Basophil % : 0.4 %    18 Nov 2019 06:18    139    |  102    |  20     ----------------------------<  103    4.6     |  32     |  0.78     Ca    9.7        18 Nov 2019 06:18          CAPILLARY BLOOD GLUCOSE            Culture - Blood (collected 11-14-19 @ 21:30)  Source: .Blood Blood-Peripheral  Preliminary Report (11-15-19 @ 22:01):    No growth to date.    Culture - Blood (collected 11-14-19 @ 21:30)  Source: .Blood Blood-Peripheral  Preliminary Report (11-15-19 @ 22:01):    No growth to date.    Culture - Fungal, Bronchial (collected 11-11-19 @ 20:30)  Source: .Bronchial Bronchoalveolar Washings  Preliminary Report (11-12-19 @ 07:58):    Testing in progress    Culture - Bronchial (collected 11-11-19 @ 20:30)  Source: Bronch Wash Bronchoalveolar Washings  Gram Stain (11-11-19 @ 22:33):    Numerous polymorphonuclear leukocytes per low power field    Few Squamous epithelial cells per low power field    Rare Gram Negative Rods per oil power field    Moderate Gram Positive Cocci in Pairs and Chains per oil power field    Few Gram Positive Cocciin Clusters per oil power field  Final Report (11-13-19 @ 16:59):    Normal Respiratory Katheryn present    Culture - Acid Fast - Bronchial w/Smear (collected 11-11-19 @ 20:30)  Source: BAL Bronchoalveolar Washings  Preliminary Report (11-13-19 @ 15:03):    Culture is being performed.    Culture - Fungal, Body Fluid (collected 11-11-19 @ 15:17)  Source: .Body Fluid Pleural Fluid  Preliminary Report (11-12-19 @ 08:10):    Testing in progress    Culture - Body Fluid with Gram Stain (collected 11-11-19 @ 15:17)  Source: .Body Fluid Pleural Fluid  Gram Stain (11-11-19 @ 21:26):    Moderate polymorphonuclear leukocytes per low power field    No organisms seen  Final Report (11-16-19 @ 12:34):    No growth at 5 days    Culture - Acid Fast - Body Fluid w/Smear (collected 11-11-19 @ 15:17)  Source: .Body Fluid Pleural Fluid  Preliminary Report (11-13-19 @ 15:03):    Culture is being performed.        RADIOLOGY & ADDITIONAL TESTS:    Personally reviewed.     Consultant(s) Notes Reviewed:  [x] YES  [ ] NO Patient is a 89y old  Male who presents with a chief complaint of SOB (18 Nov 2019 10:53)    HPI:  89yo M, w/ PMH/o HTN, HLD, carotid artery disease, heart block s/p PPM, h/o TIAs (last episode ~5yrs ago), h/o prostate cancer, h/o diverticulitis, gastritis, c/o shortness of breath and recurring fevers. Pt was recently discharged from [place] on 11/12. 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 Thoracentesis was performed with removal of 1885cc of exudative fluid. Bronchoscopy was also performed. Since he returned home, he developed fevers, Tmax 101.3 last night, a/w chills and sweats that do respond to Tylenol but always return. Additionally, pt c/o productive cough, at first blood-tinged the first day at home, which the pt attributes to the bronchoscopy procedure, though sputum now nonbloody. Pt expresses SOB is interfering even with his most menial daily tasks and feels dyspneic at rest also. Denies palpitations, abdominal pain, N/V/D, urinary symptoms, new myalgia/arthralgia.    admitted with  rt   lung  mass  recurrent rt side   malignant pleural effusion   s/p  ir guided biopsy of lung mass  found cancer  -  .       INTERVAL HPI/OVERNIGHT EVENTS: Pt seen and examined at bedside. Pt states breathing is better today, but still in need of supplemental O2(2L). Pt tolerating PO. Denies cp, n/v, abd pain, fevers/chills.      REVIEW OF SYSTEMS:  CONSTITUTIONAL: No fever or chills, admits fatigue  HEENT:  No headache, no sore throat  RESPIRATORY: No cough, wheezing, admits shortness of breath  CARDIOVASCULAR: No chest pain, palpitations  GASTROINTESTINAL: No abd pain, nausea, vomiting, or diarrhea  GENITOURINARY: No dysuria, frequency, or hematuria  NEUROLOGICAL: no focal weakness or dizziness  MUSCULOSKELETAL: admits muscle aches from bed and chair    Vital Signs Last 24 Hrs  T(C): 36.7 (18 Nov 2019 07:49), Max: 37 (17 Nov 2019 21:27)  T(F): 98 (18 Nov 2019 07:49), Max: 98.6 (17 Nov 2019 21:27)  HR: 86 (18 Nov 2019 08:19) (85 - 106)  BP: 108/71 (18 Nov 2019 07:49) (108/71 - 158/93)  BP(mean): --  RR: 22 (18 Nov 2019 07:49) (17 - 22)  SpO2: 96% (18 Nov 2019 08:19) (94% - 97%)    PHYSICAL EXAM:  GENERAL: NAD  HEENT:  anicteric, moist mucous membranes  CHEST/LUNG: +decreased BS b/l, no rales, wheezes, or rhonchi  HEART:  RRR, S1, S2 , no tachy   ABDOMEN:  BS+, soft, nontender, nondistended  EXTREMITIES: no edema, cyanosis, or calf tenderness  NERVOUS SYSTEM: answers questions and follows commands appropriately  gu intact   LABS:                        12.6   7.68  )-----------( 378      ( 18 Nov 2019 06:18 )             40.3     CBC Full  -  ( 18 Nov 2019 06:18 )  WBC Count : 7.68 K/uL  Hemoglobin : 12.6 g/dL  Hematocrit : 40.3 %  Platelet Count - Automated : 378 K/uL  Mean Cell Volume : 88.0 fl  Mean Cell Hemoglobin : 27.5 pg  Mean Cell Hemoglobin Concentration : 31.3 gm/dL  Auto Neutrophil # : 4.77 K/uL  Auto Lymphocyte # : 1.65 K/uL  Auto Monocyte # : 1.09 K/uL  Auto Eosinophil # : 0.09 K/uL  Auto Basophil # : 0.03 K/uL  Auto Neutrophil % : 62.0 %  Auto Lymphocyte % : 21.5 %  Auto Monocyte % : 14.2 %  Auto Eosinophil % : 1.2 %  Auto Basophil % : 0.4 %    18 Nov 2019 06:18    139    |  102    |  20     ----------------------------<  103    4.6     |  32     |  0.78     Ca    9.7        18 Nov 2019 06:18                  Culture - Blood (collected 11-14-19 @ 21:30)  Source: .Blood Blood-Peripheral  Preliminary Report (11-15-19 @ 22:01):    No growth to date.    Culture - Blood (collected 11-14-19 @ 21:30)  Source: .Blood Blood-Peripheral  Preliminary Report (11-15-19 @ 22:01):    No growth to date.    Culture - Fungal, Bronchial (collected 11-11-19 @ 20:30)  Source: .Bronchial Bronchoalveolar Washings  Preliminary Report (11-12-19 @ 07:58):    Testing in progress    Culture - Bronchial (collected 11-11-19 @ 20:30)  Source: Bronch Wash Bronchoalveolar Washings  Gram Stain (11-11-19 @ 22:33):    Numerous polymorphonuclear leukocytes per low power field    Few Squamous epithelial cells per low power field    Rare Gram Negative Rods per oil power field    Moderate Gram Positive Cocci in Pairs and Chains per oil power field    Few Gram Positive Cocciin Clusters per oil power field  Final Report (11-13-19 @ 16:59):    Normal Respiratory Katheryn present    Culture - Acid Fast - Bronchial w/Smear (collected 11-11-19 @ 20:30)  Source: BAL Bronchoalveolar Washings  Preliminary Report (11-13-19 @ 15:03):    Culture is being performed.    Culture - Fungal, Body Fluid (collected 11-11-19 @ 15:17)  Source: .Body Fluid Pleural Fluid  Preliminary Report (11-12-19 @ 08:10):    Testing in progress    Culture - Body Fluid with Gram Stain (collected 11-11-19 @ 15:17)  Source: .Body Fluid Pleural Fluid  Gram Stain (11-11-19 @ 21:26):    Moderate polymorphonuclear leukocytes per low power field    No organisms seen  Final Report (11-16-19 @ 12:34):    No growth at 5 days    Culture - Acid Fast - Body Fluid w/Smear (collected 11-11-19 @ 15:17)  Source: .Body Fluid Pleural Fluid  Preliminary Report (11-13-19 @ 15:03):    Culture is being performed.        RADIOLOGY & ADDITIONAL TESTS:    Personally reviewed.     Consultant(s) Notes Reviewed:  [x] YES  [ ] NO

## 2019-11-18 NOTE — PROGRESS NOTE ADULT - SUBJECTIVE AND OBJECTIVE BOX
Date/Time Patient Seen:  		  Referring MD:   Data Reviewed	       Patient is a 89y old  Male who presents with a chief complaint of SOB (17 Nov 2019 13:43)      Subjective/HPI     PAST MEDICAL & SURGICAL HISTORY:  Pacemaker: 2007  Diverticulitis  TIA (transient ischemic attack): last TIA over 5 years ago as per patient  Prostate cancer  Carpal tunnel syndrome  HTN (hypertension)  Hyperlipidemia  GERD (gastroesophageal reflux disease)  Heart block  History of cataract surgery: bilateral  S/P cholecystectomy  Pacemaker: [employer] Model # [id number] Serial #[id number]  implant date 05/20/2011        Medication list         MEDICATIONS  (STANDING):  albuterol/ipratropium for Nebulization 3 milliLiter(s) Nebulizer every 8 hours  atorvastatin 20 milliGRAM(s) Oral at bedtime  enoxaparin Injectable 40 milliGRAM(s) SubCutaneous daily  lidocaine   Patch 1 Patch Transdermal daily  metoprolol succinate  milliGRAM(s) Oral daily  pantoprazole    Tablet 40 milliGRAM(s) Oral before breakfast    MEDICATIONS  (PRN):  acetaminophen   Tablet .. 1000 milliGRAM(s) Oral every 6 hours PRN Temp greater or equal to 38C (100.4F), Mild Pain (1 - 3)  aluminum hydroxide/magnesium hydroxide/simethicone Suspension 30 milliLiter(s) Oral every 4 hours PRN Dyspepsia  guaiFENesin   Syrup  (Sugar-Free) 200 milliGRAM(s) Oral every 6 hours PRN Cough  melatonin 3 milliGRAM(s) Oral at bedtime PRN Insomnia         Vitals log        ICU Vital Signs Last 24 Hrs  T(C): 36.7 (18 Nov 2019 07:49), Max: 37 (17 Nov 2019 21:27)  T(F): 98 (18 Nov 2019 07:49), Max: 98.6 (17 Nov 2019 21:27)  HR: 86 (18 Nov 2019 08:19) (85 - 106)  BP: 108/71 (18 Nov 2019 07:49) (102/68 - 158/93)  BP(mean): --  ABP: --  ABP(mean): --  RR: 22 (18 Nov 2019 07:49) (17 - 22)  SpO2: 96% (18 Nov 2019 08:19) (94% - 97%)           Input and Output:  I&O's Detail    17 Nov 2019 07:01  -  18 Nov 2019 07:00  --------------------------------------------------------  IN:    Oral Fluid: 480 mL  Total IN: 480 mL    OUT:    Voided: 800 mL  Total OUT: 800 mL    Total NET: -320 mL          Lab Data                        12.6   7.68  )-----------( 378      ( 18 Nov 2019 06:18 )             40.3     11-18    139  |  102  |  20  ----------------------------<  103<H>  4.6   |  32<H>  |  0.78    Ca    9.7      18 Nov 2019 06:18              Review of Systems	      Objective     Physical Examination    heart s1s2  lung dec BS  abd soft      Pertinent Lab findings & Imaging      Sherly:  NO   Adequate UO     I&O's Detail    17 Nov 2019 07:01  -  18 Nov 2019 07:00  --------------------------------------------------------  IN:    Oral Fluid: 480 mL  Total IN: 480 mL    OUT:    Voided: 800 mL  Total OUT: 800 mL    Total NET: -320 mL               Discussed with:     Cultures:	        Radiology

## 2019-11-18 NOTE — PROGRESS NOTE ADULT - ASSESSMENT
89 y/o man w symptomatic right effusion, w/u also sig for 7+cm RUL mass inseparable from mediastinum. Cytology from fluid negative for malignancy, post IR bx of lung mass on 11/15/19    -incr orthopnea, w decr breath sounds right lung field, ?again rapid reaccumlation of fluid. Agree w plan to assess for need of pigtail catheter for continuous effusion drainage.  -followup IR lung bx results.  -discussed overall findings, events, significance w patient's daughter who is at beside; advised patient's daughter that patient is not surgical candidate due to pleural effusion and his age; also he is likely to not be candidate for aggressive anti-neoplastic therapy given advanced age

## 2019-11-18 NOTE — PROGRESS NOTE ADULT - PROBLEM SELECTOR PLAN 4
- Chronic, stable  - Continue home Metoprolol   - Monitor routine hemodynamics - Chronic, stable  - Continue home Metoprolol

## 2019-11-18 NOTE — PROGRESS NOTE ADULT - PROBLEM SELECTOR PLAN 1
lung mass  effusion  s/p lung bx  path pending  planned for therapeutic thoracentesis this am -   lovenox held  prognosis guarded  likely malignant mass -   supportive medical regimen  on o2 support - will likely need home o2

## 2019-11-18 NOTE — PROGRESS NOTE ADULT - ATTENDING COMMENTS
pt seen and examine see above plan - over all poor prognosis with rt lung mass / with recurrent rt pleural effusion  -  s/p ir guided lung mass  biopsy   , fu path report  pending ,  xray chest Monday if recurrent effusion -   need pleurax  vs pig tail  in am , continue   to hold plavix   continue montior  pulse ox    . pt  and family  explained need to monitor 24 to 48 hr. pt will need home o2 arrangement prior to dc home   2lit    via nc   continuos   diagnosis  possible lung ca  , ra pulse ox rest 83.
pt seen and examine see above plan - over all poor prognosis with rt lung mass / with recurrent rt pleural effusion  -  s/p ir guided lung mass  biopsy   , fu path report  pending ,  xray chest Monday if recurrent effusion - need pleurax  vs pig tail  continue montior  pulse ox  . pt explained need to monitor 24 to 48 hr. pt will need home o2 arrangement prior to dc home.
pt seen and examine see above plan - over all poor prognosis with rt lung mass / with recurrent  malignant  rt pleural effusion  -    s/p ir guided lung mass  biopsy   nonsmall  ca  path report    , s/p  rt side pleural thoracocentesis -  Poorly differentiated non-small cell carcinoma (see note).  ,  resumed   Plavix   continue montior  pulse ox    .  pt will need home o2 arrangement prior to dc home   2lit    via nc   continuos   - fu  diagnosis lung ca  ,   ra pulse ox rest 83 , dc plan in am after home 02.
pt seen and examine see above plan - over all poor prognosis with rt lung mass / with recurrent pleural effusion  -s/p ir guided lung mass  biopsy  today , fu path report , continue montior  pulse ox  .

## 2019-11-18 NOTE — PROGRESS NOTE ADULT - SUBJECTIVE AND OBJECTIVE BOX
Patient s/p right thoracentesis    Operators: Jeaneth Soto MD    Findings: 860 mL of serosanguinous fluid removed, sample of fluid submitted for analysis    Complications: None.    Blood loss: < 5 mL    Condition: Stable

## 2019-11-18 NOTE — PROGRESS NOTE ADULT - SUBJECTIVE AND OBJECTIVE BOX
Patient seen and examined;  Chart reviewed and events noted;   resting comfortably in chair - sleeping; daughter at bedside    MEDICATIONS  (STANDING):  albuterol/ipratropium for Nebulization 3 milliLiter(s) Nebulizer every 8 hours  atorvastatin 20 milliGRAM(s) Oral at bedtime  enoxaparin Injectable 40 milliGRAM(s) SubCutaneous daily  lidocaine   Patch 1 Patch Transdermal daily  metoprolol succinate  milliGRAM(s) Oral daily  pantoprazole    Tablet 40 milliGRAM(s) Oral before breakfast  zolpidem 5 milliGRAM(s) Oral at bedtime    MEDICATIONS  (PRN):  acetaminophen   Tablet .. 1000 milliGRAM(s) Oral every 6 hours PRN Temp greater or equal to 38C (100.4F), Mild Pain (1 - 3)  aluminum hydroxide/magnesium hydroxide/simethicone Suspension 30 milliLiter(s) Oral every 4 hours PRN Dyspepsia  guaiFENesin   Syrup  (Sugar-Free) 200 milliGRAM(s) Oral every 6 hours PRN Cough  melatonin 3 milliGRAM(s) Oral at bedtime PRN Insomnia      Vital Signs Last 24 Hrs  T(C): 36.7 (18 Nov 2019 07:49), Max: 37 (17 Nov 2019 21:27)  T(F): 98 (18 Nov 2019 07:49), Max: 98.6 (17 Nov 2019 21:27)  HR: 86 (18 Nov 2019 08:19) (85 - 106)  BP: 108/71 (18 Nov 2019 07:49) (102/68 - 158/93)  BP(mean): --  RR: 22 (18 Nov 2019 07:49) (17 - 22)  SpO2: 96% (18 Nov 2019 08:19) (94% - 97%)    PHYSICAL EXAM  - deferred as per request of daughter as patient is not sleeping well      LABS:                        12.6   7.68  )-----------( 378      ( 18 Nov 2019 06:18 )             40.3     11-18    139  |  102  |  20  ----------------------------<  103<H>  4.6   |  32<H>  |  0.78    Ca    9.7      18 Nov 2019 06:18    pathology - pending

## 2019-11-18 NOTE — PROGRESS NOTE ADULT - PROBLEM SELECTOR PLAN 5
- H/o TIAs, last episode approx. 5 years ago  - Hold plavix for possible pleurocentesis until Monday decision   - Continue statin daily - H/o TIAs, last episode approx. 5 years ag  - Continue statin  and Plavix.

## 2019-11-18 NOTE — PROGRESS NOTE ADULT - PROBLEM SELECTOR PLAN 1
new  rt lung mass -    s/p biopsy  pending report   , s/p bronchoscopy and thoracentesis  fluid  path was neg before .   - CTA showing increase in size of right pleural effusion  - S/p IR transthoracic lung mass biopsy- possible malignancy  wait for path   - Duonebs every 8 hours  - Continue incentive spirometry  - Tylenol 650mg q6h prn for fever  - Robitussin q6h prn for cough  - Morphine prn for air hunger  - Monitor continuous pulse ox  - Pulmonology, Dr. Sheridan wilson appreciated  -Pt to go for thoracentesis today(Lovenox held)  - will need home O2   -Heme onc, Dr. Tidwell appreciated new  rt lung mass -    s/p biopsy - nonsmall  cell ca as per path report  , s/p bronchoscopy and thoracentesis  fluid  path was neg before .   - CTA showing increase in size of right pleural effusion  - S/p IR transthoracic lung mass biopsy- possible malignancy  wait for path   - Duonebs every 8 hours  - Continue incentive spirometry  - Tylenol 650mg q6h prn for fever  - Robitussin q6h prn for cough  - Morphine prn for air hunger  - Monitor continuous pulse ox  - Pulmonology, Dr. Sheridan wilson appreciated  - will need home O2   -Heme onc, Dr. Tidwell appreciated

## 2019-11-18 NOTE — PROGRESS NOTE ADULT - NSHPATTENDINGPLANDISCUSS_GEN_ALL_CORE
Dr. Poole (hospitalist)
pt / nurse / resident/ family
pt / nurse / resident

## 2019-11-19 ENCOUNTER — TRANSCRIPTION ENCOUNTER (OUTPATIENT)
Age: 84
End: 2019-11-19

## 2019-11-19 VITALS — OXYGEN SATURATION: 95 %

## 2019-11-19 DIAGNOSIS — R50.9 FEVER, UNSPECIFIED: ICD-10-CM

## 2019-11-19 LAB
ANION GAP SERPL CALC-SCNC: 3 MMOL/L — LOW (ref 5–17)
BUN SERPL-MCNC: 19 MG/DL — SIGNIFICANT CHANGE UP (ref 7–23)
CALCIUM SERPL-MCNC: 9.7 MG/DL — SIGNIFICANT CHANGE UP (ref 8.5–10.1)
CHLORIDE SERPL-SCNC: 100 MMOL/L — SIGNIFICANT CHANGE UP (ref 96–108)
CO2 SERPL-SCNC: 35 MMOL/L — HIGH (ref 22–31)
CREAT SERPL-MCNC: 0.84 MG/DL — SIGNIFICANT CHANGE UP (ref 0.5–1.3)
CULTURE RESULTS: SIGNIFICANT CHANGE UP
CULTURE RESULTS: SIGNIFICANT CHANGE UP
GLUCOSE SERPL-MCNC: 94 MG/DL — SIGNIFICANT CHANGE UP (ref 70–99)
HCT VFR BLD CALC: 38.8 % — LOW (ref 39–50)
HGB BLD-MCNC: 12 G/DL — LOW (ref 13–17)
MCHC RBC-ENTMCNC: 27.2 PG — SIGNIFICANT CHANGE UP (ref 27–34)
MCHC RBC-ENTMCNC: 30.9 GM/DL — LOW (ref 32–36)
MCV RBC AUTO: 88 FL — SIGNIFICANT CHANGE UP (ref 80–100)
NIGHT BLUE STAIN TISS: SIGNIFICANT CHANGE UP
NRBC # BLD: 0 /100 WBCS — SIGNIFICANT CHANGE UP (ref 0–0)
PLATELET # BLD AUTO: 379 K/UL — SIGNIFICANT CHANGE UP (ref 150–400)
POTASSIUM SERPL-MCNC: 5.1 MMOL/L — SIGNIFICANT CHANGE UP (ref 3.5–5.3)
POTASSIUM SERPL-SCNC: 5.1 MMOL/L — SIGNIFICANT CHANGE UP (ref 3.5–5.3)
RBC # BLD: 4.41 M/UL — SIGNIFICANT CHANGE UP (ref 4.2–5.8)
RBC # FLD: 12.7 % — SIGNIFICANT CHANGE UP (ref 10.3–14.5)
SODIUM SERPL-SCNC: 138 MMOL/L — SIGNIFICANT CHANGE UP (ref 135–145)
SPECIMEN SOURCE: SIGNIFICANT CHANGE UP
WBC # BLD: 9.46 K/UL — SIGNIFICANT CHANGE UP (ref 3.8–10.5)
WBC # FLD AUTO: 9.46 K/UL — SIGNIFICANT CHANGE UP (ref 3.8–10.5)

## 2019-11-19 PROCEDURE — 87206 SMEAR FLUORESCENT/ACID STAI: CPT

## 2019-11-19 PROCEDURE — 77012 CT SCAN FOR NEEDLE BIOPSY: CPT

## 2019-11-19 PROCEDURE — 71275 CT ANGIOGRAPHY CHEST: CPT

## 2019-11-19 PROCEDURE — 85610 PROTHROMBIN TIME: CPT

## 2019-11-19 PROCEDURE — 88312 SPECIAL STAINS GROUP 1: CPT

## 2019-11-19 PROCEDURE — 97162 PT EVAL MOD COMPLEX 30 MIN: CPT

## 2019-11-19 PROCEDURE — 87116 MYCOBACTERIA CULTURE: CPT

## 2019-11-19 PROCEDURE — 80048 BASIC METABOLIC PNL TOTAL CA: CPT

## 2019-11-19 PROCEDURE — 88313 SPECIAL STAINS GROUP 2: CPT

## 2019-11-19 PROCEDURE — 88108 CYTOPATH CONCENTRATE TECH: CPT

## 2019-11-19 PROCEDURE — 83986 ASSAY PH BODY FLUID NOS: CPT

## 2019-11-19 PROCEDURE — 88305 TISSUE EXAM BY PATHOLOGIST: CPT

## 2019-11-19 PROCEDURE — 82550 ASSAY OF CK (CPK): CPT

## 2019-11-19 PROCEDURE — 93005 ELECTROCARDIOGRAM TRACING: CPT

## 2019-11-19 PROCEDURE — 32555 ASPIRATE PLEURA W/ IMAGING: CPT

## 2019-11-19 PROCEDURE — 94640 AIRWAY INHALATION TREATMENT: CPT

## 2019-11-19 PROCEDURE — 88341 IMHCHEM/IMCYTCHM EA ADD ANTB: CPT

## 2019-11-19 PROCEDURE — 83615 LACTATE (LD) (LDH) ENZYME: CPT

## 2019-11-19 PROCEDURE — 82803 BLOOD GASES ANY COMBINATION: CPT

## 2019-11-19 PROCEDURE — 87631 RESP VIRUS 3-5 TARGETS: CPT

## 2019-11-19 PROCEDURE — 88334 PATH CONSLTJ SURG CYTO XM EA: CPT

## 2019-11-19 PROCEDURE — 84157 ASSAY OF PROTEIN OTHER: CPT

## 2019-11-19 PROCEDURE — 80053 COMPREHEN METABOLIC PANEL: CPT

## 2019-11-19 PROCEDURE — 88342 IMHCHEM/IMCYTCHM 1ST ANTB: CPT

## 2019-11-19 PROCEDURE — 83605 ASSAY OF LACTIC ACID: CPT

## 2019-11-19 PROCEDURE — 99285 EMERGENCY DEPT VISIT HI MDM: CPT | Mod: 25

## 2019-11-19 PROCEDURE — 87070 CULTURE OTHR SPECIMN AEROBIC: CPT

## 2019-11-19 PROCEDURE — 99239 HOSP IP/OBS DSCHRG MGMT >30: CPT | Mod: GC

## 2019-11-19 PROCEDURE — 82042 OTHER SOURCE ALBUMIN QUAN EA: CPT

## 2019-11-19 PROCEDURE — 87075 CULTR BACTERIA EXCEPT BLOOD: CPT

## 2019-11-19 PROCEDURE — 32405: CPT

## 2019-11-19 PROCEDURE — 84145 PROCALCITONIN (PCT): CPT

## 2019-11-19 PROCEDURE — 71045 X-RAY EXAM CHEST 1 VIEW: CPT

## 2019-11-19 PROCEDURE — 94760 N-INVAS EAR/PLS OXIMETRY 1: CPT

## 2019-11-19 PROCEDURE — 87102 FUNGUS ISOLATION CULTURE: CPT

## 2019-11-19 PROCEDURE — 85027 COMPLETE CBC AUTOMATED: CPT

## 2019-11-19 PROCEDURE — 86140 C-REACTIVE PROTEIN: CPT

## 2019-11-19 PROCEDURE — 82945 GLUCOSE OTHER FLUID: CPT

## 2019-11-19 PROCEDURE — 89051 BODY FLUID CELL COUNT: CPT

## 2019-11-19 PROCEDURE — 87205 SMEAR GRAM STAIN: CPT

## 2019-11-19 PROCEDURE — 85730 THROMBOPLASTIN TIME PARTIAL: CPT

## 2019-11-19 PROCEDURE — 88333 PATH CONSLTJ SURG CYTO XM 1: CPT

## 2019-11-19 PROCEDURE — 84484 ASSAY OF TROPONIN QUANT: CPT

## 2019-11-19 PROCEDURE — C1729: CPT

## 2019-11-19 PROCEDURE — 87015 SPECIMEN INFECT AGNT CONCNTJ: CPT

## 2019-11-19 PROCEDURE — 36415 COLL VENOUS BLD VENIPUNCTURE: CPT

## 2019-11-19 PROCEDURE — 87040 BLOOD CULTURE FOR BACTERIA: CPT

## 2019-11-19 RX ORDER — LIDOCAINE 4 G/100G
1 CREAM TOPICAL
Qty: 15 | Refills: 0
Start: 2019-11-19 | End: 2019-12-03

## 2019-11-19 RX ORDER — IPRATROPIUM/ALBUTEROL SULFATE 18-103MCG
3 AEROSOL WITH ADAPTER (GRAM) INHALATION
Qty: 1 | Refills: 0
Start: 2019-11-19 | End: 2019-12-04

## 2019-11-19 RX ORDER — IPRATROPIUM/ALBUTEROL SULFATE 18-103MCG
3 AEROSOL WITH ADAPTER (GRAM) INHALATION
Qty: 1 | Refills: 0
Start: 2019-11-19 | End: 2019-12-03

## 2019-11-19 RX ORDER — LIDOCAINE 4 G/100G
1 CREAM TOPICAL
Qty: 15 | Refills: 0
Start: 2019-11-19 | End: 2019-12-04

## 2019-11-19 RX ADMIN — PANTOPRAZOLE SODIUM 40 MILLIGRAM(S): 20 TABLET, DELAYED RELEASE ORAL at 06:15

## 2019-11-19 RX ADMIN — Medication 1000 MILLIGRAM(S): at 09:23

## 2019-11-19 RX ADMIN — LIDOCAINE 1 PATCH: 4 CREAM TOPICAL at 06:19

## 2019-11-19 RX ADMIN — Medication 100 MILLIGRAM(S): at 06:15

## 2019-11-19 RX ADMIN — CLOPIDOGREL BISULFATE 75 MILLIGRAM(S): 75 TABLET, FILM COATED ORAL at 12:31

## 2019-11-19 RX ADMIN — LIDOCAINE 1 PATCH: 4 CREAM TOPICAL at 12:32

## 2019-11-19 RX ADMIN — Medication 3 MILLILITER(S): at 16:09

## 2019-11-19 RX ADMIN — LIDOCAINE 1 PATCH: 4 CREAM TOPICAL at 06:18

## 2019-11-19 RX ADMIN — Medication 1000 MILLIGRAM(S): at 10:23

## 2019-11-19 RX ADMIN — Medication 3 MILLILITER(S): at 07:36

## 2019-11-19 RX ADMIN — ENOXAPARIN SODIUM 40 MILLIGRAM(S): 100 INJECTION SUBCUTANEOUS at 12:32

## 2019-11-19 NOTE — DISCHARGE NOTE PROVIDER - NSDCFUADDAPPT_GEN_ALL_CORE_FT
Follow up with your primary care doctor Dr. Amado in 2-3days  Follow up with heme oncologist Dr. Bess within 1 week of discharge. Follow up with your primary care doctor Dr. Amado in 2-3days  Follow up with heme oncologist Dr. Bess within 1 week of discharge   FOR DISCUSSING PATH report .

## 2019-11-19 NOTE — DISCHARGE NOTE NURSING/CASE MANAGEMENT/SOCIAL WORK - NSDCFUADDAPPT_GEN_ALL_CORE_FT
Follow up with your primary care doctor Dr. Amado in 2-3days  Follow up with heme oncologist Dr. Bess within 1 week of discharge   FOR DISCUSSING PATH report .

## 2019-11-19 NOTE — DISCHARGE NOTE NURSING/CASE MANAGEMENT/SOCIAL WORK - PATIENT PORTAL LINK FT
You can access the FollowMyHealth Patient Portal offered by [facility] by registering at the following website: [email or web address]. By joining [employer]’s FollowMyHealth portal, you will also be able to view your health information using other applications (apps) compatible with our system.

## 2019-11-19 NOTE — DISCHARGE NOTE PROVIDER - NSDCCPCAREPLAN_GEN_ALL_CORE_FT
PRINCIPAL DISCHARGE DIAGNOSIS  Diagnosis: Lung mass  Assessment and Plan of Treatment: You had a lung biopsy done which showed you have poorly differentiated non-small cell carcinoma which was likley causing your symptoms of shortness of breath and fever. You have been prescribed home O2 and a nebulizer to help alleviate symptoms  - You may need Pleurx Catheter if effusion returns - this can be done as outpatient.   -Follow up with your doctor Dr. Amado in 2-3days for repeat CxR and referral to pulmonologist  -Follow up with oncologist Dr. Bess within 1 week of discharge      SECONDARY DISCHARGE DIAGNOSES  Diagnosis: Pleural effusion  Assessment and Plan of Treatment: You had a pleurocentsis done while in the hospital. 860cc of fluid removed.   -You may need Pleurx Catheter if effusion returns - this can be done as outpatient.   -Follow up with your doctor Dr. Amado in 2-3days for repeat CxR and referral to pulmonologist    Diagnosis: HTN (hypertension)  Assessment and Plan of Treatment: Continue with your home hypertensive medications.    Diagnosis: Fever, unspecified fever cause  Assessment and Plan of Treatment: You fever was likely secondary to your lung  poorly differentiated non-small cell carcinoma. PRINCIPAL DISCHARGE DIAGNOSIS  Diagnosis: Lung mass  Assessment and Plan of Treatment: You had a lung biopsy done which showed you have poorly differentiated non-small cell carcinoma which was likley causing your symptoms of shortness of breath and fever. You have been prescribed home O2 and a nebulizer to help alleviate symptoms  - You may need Pleurx Catheter if effusion returns - this can be done as outpatient.   -Follow up with your doctor Dr. Amado in 2-3days for repeat CxR and referral to pulmonologist  -Follow up with oncologist Dr. Bess within 1 week of discharge      SECONDARY DISCHARGE DIAGNOSES  Diagnosis: Pleural effusion  Assessment and Plan of Treatment: You had a pleurocentsis done while in the hospital. 860cc of fluid removed.   -You may need Pleurx Catheter if effusion returns - this can be done as outpatient.   -Follow up with your doctor Dr. Amado in 2-3days for repeat CxR and referral to pulmonologist

## 2019-11-19 NOTE — PHYSICAL THERAPY INITIAL EVALUATION ADULT - PERTINENT HX OF CURRENT PROBLEM, REHAB EVAL
89yo M, w/ PMH/o HTN, HLD, carotid artery disease, heart block s/p PPM, h/o TIAs (last episode ~5yrs ago), h/o prostate cancer, h/o diverticulitis, gastritis, c/o shortness of breath and recurring fevers.

## 2019-11-19 NOTE — PROGRESS NOTE ADULT - PROBLEM SELECTOR PLAN 1
lung mass and pleural eff - NSCLC - from lung mass bx -   s/p pleurocentesis - 860 cc - paramalignant eff - path pending - 1st thoracentesis - neg path  heme onc following  may need Pleurx Cath if effusion returns - this can be done as outpatient - discussed with patient this am   prognosis guarded - advanced age - lung ca - markers pending - may be a candidate for Immunotherapy  dc planning  may need home o2 support

## 2019-11-19 NOTE — DISCHARGE NOTE PROVIDER - CARE PROVIDERS DIRECT ADDRESSES
[email or web address],DirectAddress_Unknown

## 2019-11-19 NOTE — DISCHARGE NOTE PROVIDER - CARE PROVIDER_API CALL
Angel Amado)  Internal Medicine  [address] [place] [address]  Phone: [phone]  Fax: [phone]  Follow Up Time:     Thor Bess)  Hematology; Medical Oncology  [address], [address]  [place] [address]  Phone: [phone]  Fax: [phone]  Follow Up Time:

## 2019-11-19 NOTE — PROGRESS NOTE ADULT - PROBLEM SELECTOR PROBLEM 1
Lung mass
Shortness of breath

## 2019-11-19 NOTE — PHYSICAL THERAPY INITIAL EVALUATION ADULT - ADDITIONAL COMMENTS
pt lives in the 2nd floor of a 2 family house, + steps. Dtr lives downstairs. pt ambulates independently without device and is independent with ADLs. + driving.  Patient given rolling walker on last admission but  has progressed off.

## 2019-11-19 NOTE — CHART NOTE - NSCHARTNOTEFT_GEN_A_CORE
Called by RN for evaluation of patient w/ hypoxia.  Patient has recent diagnosis of hypoxemic respiratory failure 2/2 Lung CA, now s/p thoracetensis.  O2 saturation on RA remains <88% at rest.    Patient is in need of home O2 and is aware, will require long term use  Other methods such as nebulizer treatment /inhalers have been tried and failed    Patient to be discharged to home w/ home O2

## 2019-11-19 NOTE — PROGRESS NOTE ADULT - SUBJECTIVE AND OBJECTIVE BOX
All interim records and events noted.    asleep sitting up in chair, appears comfortable      MEDICATIONS  (STANDING):  albuterol/ipratropium for Nebulization 3 milliLiter(s) Nebulizer every 8 hours  atorvastatin 20 milliGRAM(s) Oral at bedtime  clopidogrel Tablet 75 milliGRAM(s) Oral daily  enoxaparin Injectable 40 milliGRAM(s) SubCutaneous daily  lidocaine   Patch 1 Patch Transdermal daily  metoprolol succinate  milliGRAM(s) Oral daily  pantoprazole    Tablet 40 milliGRAM(s) Oral before breakfast    MEDICATIONS  (PRN):  acetaminophen   Tablet .. 1000 milliGRAM(s) Oral every 6 hours PRN Temp greater or equal to 38C (100.4F), Mild Pain (1 - 3)  aluminum hydroxide/magnesium hydroxide/simethicone Suspension 30 milliLiter(s) Oral every 4 hours PRN Dyspepsia  guaiFENesin   Syrup  (Sugar-Free) 200 milliGRAM(s) Oral every 6 hours PRN Cough  melatonin 3 milliGRAM(s) Oral at bedtime PRN Insomnia      Vital Signs Last 24 Hrs  T(C): 36.9 (19 Nov 2019 07:30), Max: 37.2 (18 Nov 2019 20:00)  T(F): 98.4 (19 Nov 2019 07:30), Max: 99 (18 Nov 2019 20:00)  HR: 107 (19 Nov 2019 10:00) (84 - 107)  BP: 121/79 (19 Nov 2019 07:30) (106/75 - 127/83)  BP(mean): --  RR: 25 (19 Nov 2019 07:30) (20 - 25)  SpO2: 94% (19 Nov 2019 10:00) (94% - 96%)    PHYSICAL EXAM  General: well developed  well nourished, in no acute distress  Head: atraumatic, normocephalic  Neck: supple, trachea midline  CV: S1 S2, regular rate and rhythm  Lungs: clear to auscultation, no wheezes/rhonchi  Abdomen: soft, nontender, bowel sounds present, pouchy  Extrem: no clubbing/cyanosis/edema  Skin: no significant increased ecchymosis/petechiae        LABS:             12.0   9.46  )-----------( 379      ( 11-19 @ 06:55 )             38.8                12.6   7.68  )-----------( 378      ( 11-18 @ 06:18 )             40.3       11-19    138  |  100  |  19  ----------------------------<  94  5.1   |  35<H>  |  0.84    Ca    9.7      19 Nov 2019 06:55      11-14 @ 16:30  PT15.5 INR1.36  PTT27.6      RADIOLOGY & ADDITIONAL STUDIES:    IMPRESSION/RECOMMENDATIONS:

## 2019-11-19 NOTE — PROGRESS NOTE ADULT - SUBJECTIVE AND OBJECTIVE BOX
Date/Time Patient Seen:  		  Referring MD:   Data Reviewed	       Patient is a 89y old  Male who presents with a chief complaint of SOB (18 Nov 2019 12:16)      Subjective/HPI     PAST MEDICAL & SURGICAL HISTORY:  Pacemaker: 2007  Diverticulitis  TIA (transient ischemic attack): last TIA over 5 years ago as per patient  Prostate cancer  Carpal tunnel syndrome  HTN (hypertension)  Hyperlipidemia  GERD (gastroesophageal reflux disease)  Heart block  History of cataract surgery: bilateral  S/P cholecystectomy  Pacemaker: [employer] Model # [id number] Serial #[id number]  implant date 05/20/2011        Medication list         MEDICATIONS  (STANDING):  albuterol/ipratropium for Nebulization 3 milliLiter(s) Nebulizer every 8 hours  atorvastatin 20 milliGRAM(s) Oral at bedtime  clopidogrel Tablet 75 milliGRAM(s) Oral daily  enoxaparin Injectable 40 milliGRAM(s) SubCutaneous daily  lidocaine   Patch 1 Patch Transdermal daily  metoprolol succinate  milliGRAM(s) Oral daily  pantoprazole    Tablet 40 milliGRAM(s) Oral before breakfast    MEDICATIONS  (PRN):  acetaminophen   Tablet .. 1000 milliGRAM(s) Oral every 6 hours PRN Temp greater or equal to 38C (100.4F), Mild Pain (1 - 3)  aluminum hydroxide/magnesium hydroxide/simethicone Suspension 30 milliLiter(s) Oral every 4 hours PRN Dyspepsia  guaiFENesin   Syrup  (Sugar-Free) 200 milliGRAM(s) Oral every 6 hours PRN Cough  melatonin 3 milliGRAM(s) Oral at bedtime PRN Insomnia         Vitals log        ICU Vital Signs Last 24 Hrs  T(C): 36.8 (19 Nov 2019 04:28), Max: 37.2 (18 Nov 2019 20:00)  T(F): 98.2 (19 Nov 2019 04:28), Max: 99 (18 Nov 2019 20:00)  HR: 104 (19 Nov 2019 04:28) (84 - 104)  BP: 127/83 (19 Nov 2019 04:28) (106/75 - 127/83)  BP(mean): --  ABP: --  ABP(mean): --  RR: 20 (19 Nov 2019 04:28) (20 - 22)  SpO2: 95% (19 Nov 2019 04:28) (94% - 96%)           Input and Output:  I&O's Detail    18 Nov 2019 07:01  -  19 Nov 2019 07:00  --------------------------------------------------------  IN:  Total IN: 0 mL    OUT:    Voided: 200 mL  Total OUT: 200 mL    Total NET: -200 mL          Lab Data                        12.0   9.46  )-----------( 379      ( 19 Nov 2019 06:55 )             38.8     11-18    139  |  102  |  20  ----------------------------<  103<H>  4.6   |  32<H>  |  0.78    Ca    9.7      18 Nov 2019 06:18              Review of Systems	      Objective     Physical Examination    heart s1s2  lung dec BS  abd soft      Pertinent Lab findings & Imaging      Sherly:  NO   Adequate UO     I&O's Detail    18 Nov 2019 07:01  -  19 Nov 2019 07:00  --------------------------------------------------------  IN:  Total IN: 0 mL    OUT:    Voided: 200 mL  Total OUT: 200 mL    Total NET: -200 mL               Discussed with:     Cultures:	        Radiology

## 2019-11-19 NOTE — DISCHARGE NOTE PROVIDER - HOSPITAL COURSE
FROM ADMISSION H+P:     HPI:    89yo M, w/ PMH/o HTN, HLD, carotid artery disease, heart block s/p PPM, h/o TIAs (last episode ~5yrs ago), h/o prostate cancer, h/o diverticulitis, gastritis, c/o shortness of breath and recurring fevers. Pt was recently discharged from [facility] on 11/12. 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 Thoracentesis was performed with removal of 1885cc of exudative fluid. Bronchoscopy was also performed. Since he returned home, he developed fevers, Tmax 101.3 last night, a/w chills and sweats that do respond to Tylenol but always return. Additionally, pt c/o productive cough, at first blood-tinged the first day at home, which the pt attributes to the bronchoscopy procedure, though sputum now nonbloody. Pt expresses SOB is interfering even with his most menial daily tasks and feels dyspneic at rest also. Denies palpitations, abdominal pain, N/V/D, urinary symptoms, new myalgia/arthralgia.        In the ED    VS T 97.6 /77 HR 96 RR 18 SpO2 92% on room air    Significant labs include: Lactate 2.2, Trop negative x1.    CXR: Low lung volumes. Left ICD reidentified in position. No change heart mediastinum. The loculated right pleural effusion unchanged. No change in the fibrotic atelectatic changes right base. Infiltrate not excluded.    EKG: atrial-sensed ventricular-paced rhythm        Received 1L NS, 1x Zosyn. Seen by pulm Dr. Swartz. (14 Nov 2019 19:01)            ---    HOSPITAL COURSE: Patient was admitted for shortness of breath, cough, and fever in setting of new lung mass. CXR was done and showed loculated right pleural effusion. CT angio chest with IV contrast was done and showed right upper lobe mass contiguous with the right lung apex and right hilum, and inseparable from the mediastinum, diffuse pleural masses, and interval increase in loculated right pleural effusion. Bronchoscopy and pleurocentesis done on previous admission earlier in the week. Right lung biopsy was done on 11/15 and pathology report showed poorly differentiated non-small cell carcinoma. Pleurocentesis was done and 860cc of fluid were removed. Patient qualified for home O2. The patient was seen and examined on the day of discharge by the attending physician. Pt stable for discharge. Please see discharge note for additional information regarding the hospital course and the day of discharge.         T(C): 36.7 (11-19-19 @ 11:05), Max: 37.2 (11-18-19 @ 20:00)    HR: 100 (11-19-19 @ 11:05) (84 - 107)    BP: 103/67 (11-19-19 @ 11:05) (103/67 - 127/83)    RR: 23 (11-19-19 @ 11:05) (20 - 25)    SpO2: 94% (11-19-19 @ 11:05) (94% - 96%)        GENERAL: patient breathing comfortably on 3l NC, no acute distress, appropriate    LUNGS: decreased BS diffusely, no wheezing, rales, or rhonchi appreciated    HEART: soft S1/S2, regular rate and rhythm    GASTROINTESTINAL: abdomen is soft, nontender, nondistended, normoactive bowel sounds, no palpable masses    INTEGUMENT: good skin turgor, warm skin, appears well perfused    MUSCULOSKELETAL: no clubbing or cyanosis, no obvious deformity    NEUROLOGIC: awake, alert, oriented x3, no obvious sensory deficits    PSYCHIATRIC: mood is good, affect is congruent, linear and logical thought process        ---    CONSULTANTS:    [place]: Dr. Bess    Pulm: Dr. Swartz FROM ADMISSION H+P:     HPI:    89yo M, w/ PMH/o HTN, HLD, carotid artery disease, heart block s/p PPM, h/o TIAs (last episode ~5yrs ago), h/o prostate cancer, h/o diverticulitis, gastritis, c/o shortness of breath and recurring fevers. Pt was recently discharged from [facility] on 11/12. 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 Thoracentesis was performed with removal of 1885cc of exudative fluid. Bronchoscopy was also performed. Since he returned home, he developed fevers, Tmax 101.3 last night, a/w chills and sweats that do respond to Tylenol but always return. Additionally, pt c/o productive cough, at first blood-tinged the first day at home, which the pt attributes to the bronchoscopy procedure, though sputum now nonbloody. Pt expresses SOB is interfering even with his most menial daily tasks and feels dyspneic at rest also. Denies palpitations, abdominal pain, N/V/D, urinary symptoms, new myalgia/arthralgia.                ---    HOSPITAL COURSE: Patient was admitted for shortness of breath, cough, and fever in setting of new lung mass  with recent thoracocentesis rt pleural effusion .     CXR was done and showed loculated right pleural effusion. CT angio chest with IV contrast was done and showed right upper lobe mass contiguous with the right lung apex and right hilum, and inseparable from the mediastinum, diffuse pleural masses, and interval increase in loculated right pleural effusion.     rt lung mass / with recurrent  malignant  rt pleural effusion       Bronchoscopy and pleurocentesis done on previous admission earlier in the week. Right lung biopsy was done on 11/15 and pathology report showed poorly differentiated non-small cell carcinoma. 860cc of fluid were removed by ir guided . Patient qualified for home O2. The patient was seen and examined on the day of discharge by the attending physician. Pt stable for discharge. Please see discharge note for additional information regarding the hospital course and the day of discharge.      PHYSICAL DAY of dc -9     T(C): 36.7 (11-19-19 @ 11:05), Max: 37.2 (11-18-19 @ 20:00)    HR: 100 (11-19-19 @ 11:05) (84 - 107)    BP: 103/67 (11-19-19 @ 11:05) (103/67 - 127/83)    RR: 23 (11-19-19 @ 11:05) (20 - 25)    SpO2: 94% (11-19-19 @ 11:05) (94% - 96%)        GENERAL: comfortably on 3l NC, no acute distress,    LUNGS: decreased  at rt side  chest  ,  lt no wheezing, rales, or rhonchi     HEART: soft S1/S2, regular rate and rhythm , no tachy     GASTROINTESTINAL: soft, nontender, nondistended, normoactive bowel sounds, no palpable masses    INTEGUMENT:  no rash , no wound     MUSCULOSKELETAL: no clubbing or cyanosis, no obvious deformity    NEUROLOGIC: awake, alert, oriented x3, no obvious sensory deficits    gu intact         ---    CONSULTANTS:    [place]: Dr. Bess    Pulm: Dr. Swartz

## 2019-11-19 NOTE — PROGRESS NOTE ADULT - ASSESSMENT
89 y/o man w symptomatic right effusion, w/u also sig for 7+cm RUL mass inseparable from mediastinum. Cytology from fluid negative for malignancy, post IR bx of lung mass on 11/15/19    -spoke w Pathology, bx c/w malignancy, feels it's lung primary but some initial markers conflicting results, await final markers. PDL 1 also sent. Not small cell ca. Possible Foundation one additional testing on tissue pending results. Final results will not be available until later this week or later.  -stable from Heme/Onc standpoint for discharge and will follow up in office    discussed w Medicine    will sign off, please call if any questions

## 2019-11-19 NOTE — PHYSICAL THERAPY INITIAL EVALUATION ADULT - CRITERIA FOR SKILLED THERAPEUTIC INTERVENTIONS
therapy frequency/anticipated equipment needs at discharge/predicted duration of therapy intervention/risk reduction/prevention/rehab potential/anticipated discharge recommendation/impairments found/functional limitations in following categories

## 2019-11-19 NOTE — DISCHARGE NOTE PROVIDER - NSDCMRMEDTOKEN_GEN_ALL_CORE_FT
clopidogrel 75 mg oral tablet: 1 tab(s) orally once a day (at bedtime)  ipratropium-albuterol 0.5 mg-2.5 mg/3 mLinhalation solution: 3 milliliter(s) inhaled every 8 hours  lidocaine 1.8% topical film: Apply topically to affected area once a day / 12hr on /12 off  metoprolol succinate 100 mg oral tablet, extended release: 1 tab(s) orally once a day  nebulizer machine: 1   oxygen: 2 liter(s) continuous via nasal cannula: via portable O2        pantoprazole 40 mg oral delayed release tablet: 1 tab(s) orally once a day  Rolling Walker: Use as directed  ICD 10: R26.81 Unsteadiness on feet  EBONI:99  rosuvastatin 5 mg oral tablet: 1 tab(s) orally once a day

## 2019-11-19 NOTE — PHYSICAL THERAPY INITIAL EVALUATION ADULT - GAIT TRAINING, PT EVAL
Ambulate > 150 ft with rolling walker and close supervision within 3-5 sessions to allow patient to get from bathroom to bedroom.

## 2019-11-19 NOTE — PHYSICAL THERAPY INITIAL EVALUATION ADULT - GENERAL OBSERVATIONS, REHAB EVAL
Patient received sitting in recliner, no apparent distress, nasal canula oxygen and cardiac monitor in place.  Patient wearing neck pillow.

## 2019-11-19 NOTE — DISCHARGE NOTE NURSING/CASE MANAGEMENT/SOCIAL WORK - NSDCPEPTSTRK_GEN_ALL_CORE
Prescribed medications/Call 911 for stroke/Need for follow up after discharge/Stroke education booklet/Risk factors for stroke/Stroke support groups for patients, families, and friends/Stroke warning signs and symptoms/Signs and symptoms of stroke

## 2019-11-20 DIAGNOSIS — M54.9 DORSALGIA, UNSPECIFIED: ICD-10-CM

## 2019-11-20 RX ORDER — IPRATROPIUM BROMIDE AND ALBUTEROL SULFATE 2.5; .5 MG/3ML; MG/3ML
0.5-2.5 (3) SOLUTION RESPIRATORY (INHALATION)
Qty: 3 | Refills: 2 | Status: ACTIVE | COMMUNITY
Start: 2019-11-20 | End: 1900-01-01

## 2019-11-20 RX ORDER — LIDOCAINE 5 G/100G
5 OINTMENT TOPICAL
Qty: 1 | Refills: 1 | Status: ACTIVE | COMMUNITY
Start: 2019-11-20 | End: 1900-01-01

## 2019-11-21 DIAGNOSIS — M54.2 CERVICALGIA: ICD-10-CM

## 2019-11-21 DIAGNOSIS — J90 PLEURAL EFFUSION, NOT ELSEWHERE CLASSIFIED: ICD-10-CM

## 2019-11-21 RX ORDER — LIDOCAINE 5% 700 MG/1
5 PATCH TOPICAL
Qty: 60 | Refills: 3 | Status: ACTIVE | COMMUNITY
Start: 2019-11-21 | End: 1900-01-01

## 2019-11-22 DIAGNOSIS — G47.00 INSOMNIA, UNSPECIFIED: ICD-10-CM

## 2019-11-22 RX ORDER — ZOLPIDEM TARTRATE 5 MG/1
5 TABLET ORAL
Qty: 30 | Refills: 2 | Status: ACTIVE | COMMUNITY
Start: 2019-11-22 | End: 1900-01-01

## 2019-11-24 LAB
CULTURE RESULTS: SIGNIFICANT CHANGE UP
SPECIMEN SOURCE: SIGNIFICANT CHANGE UP

## 2019-11-25 ENCOUNTER — APPOINTMENT (OUTPATIENT)
Dept: PULMONOLOGY | Facility: CLINIC | Age: 84
End: 2019-11-25
Payer: MEDICARE

## 2019-11-25 VITALS
TEMPERATURE: 97.6 F | HEART RATE: 109 BPM | HEIGHT: 65 IN | OXYGEN SATURATION: 91 % | SYSTOLIC BLOOD PRESSURE: 133 MMHG | WEIGHT: 180 LBS | BODY MASS INDEX: 29.99 KG/M2 | DIASTOLIC BLOOD PRESSURE: 78 MMHG

## 2019-11-25 DIAGNOSIS — C34.90 MALIGNANT NEOPLASM OF UNSPECIFIED PART OF UNSPECIFIED BRONCHUS OR LUNG: ICD-10-CM

## 2019-11-25 DIAGNOSIS — R06.02 SHORTNESS OF BREATH: ICD-10-CM

## 2019-11-25 PROCEDURE — 99204 OFFICE O/P NEW MOD 45 MIN: CPT

## 2019-11-25 NOTE — PHYSICAL EXAM
[Well Groomed] : well groomed [General Appearance - In No Acute Distress] : no acute distress [Heart Sounds] : normal S1 and S2 [Neck Appearance] : the appearance of the neck was normal [] : no respiratory distress [Respiration, Rhythm And Depth] : normal respiratory rhythm and effort [Exaggerated Use Of Accessory Muscles For Inspiration] : no accessory muscle use [Nail Clubbing] : no clubbing of the fingernails [FreeTextEntry1] : decreased bs [Cyanosis, Localized] : no localized cyanosis

## 2019-11-25 NOTE — HISTORY OF PRESENT ILLNESS
[FreeTextEntry1] : 89M recently diagnosed with poorly differentiated adenocarcinoma of the lung.  Patient with pleural effusion drained x 2 while hospitalized, now with recurrent effusion.  Patient was seen by  during hospitlization who has arranged for him to have pleurex catheter placed by interventional radiology.  Today patient reports dyspnea and being generally uncomfortable.  Understandably upset regarding recent diagnosis.  He is frustrated and wants the pleurex done asap.  \par \par former smoker, uses nebulizer tx 3x daily.

## 2019-11-25 NOTE — ASSESSMENT
[FreeTextEntry1] : Patient very upset during interview, convinced he will be having pleurex placed tomorrow.  I discussed with him and family memebers that it may not happen as quickly as he likes and if he is feeling worse he should present back to the hospital for more emergent treatment.  They understand and agree.  Will hold off on PFT testing at this time as patient is very uncomfortable and upset.  \par \par Patient requested a humidifier for his home O2 portable concentrator--will touch base with vendor to see if this can be added.\par \par Instructed pt and family members to follow up once pleurex catheter has been placed and patient feeling better.  \par \par fu with .

## 2019-11-25 NOTE — CONSULT LETTER
[FreeTextEntry1] : Dear ,\par \par I had the pleasure of evaluating your patient, RONNIE LIN today in pulmonary consultation.  Please refer to my attached note for my findings and recommendations.\par \par \par Thank you for allowing me to participate in the care of your patient, please feel free to call with any questions or concerns.\par \par \par Sincerely,\par \par Jocelyne Lofton MD\par [facility] \par [facility]\par \par

## 2019-12-04 ENCOUNTER — INPATIENT (INPATIENT)
Facility: HOSPITAL | Age: 84
LOS: 0 days | DRG: 181 | End: 2019-12-05
Attending: INTERNAL MEDICINE | Admitting: HOSPITALIST
Payer: MEDICARE

## 2019-12-04 VITALS
SYSTOLIC BLOOD PRESSURE: 138 MMHG | HEART RATE: 105 BPM | RESPIRATION RATE: 22 BRPM | OXYGEN SATURATION: 92 % | DIASTOLIC BLOOD PRESSURE: 83 MMHG | TEMPERATURE: 98 F | WEIGHT: 164.91 LBS

## 2019-12-04 DIAGNOSIS — J18.9 PNEUMONIA, UNSPECIFIED ORGANISM: ICD-10-CM

## 2019-12-04 DIAGNOSIS — R09.02 HYPOXEMIA: ICD-10-CM

## 2019-12-04 DIAGNOSIS — Z66 DO NOT RESUSCITATE: ICD-10-CM

## 2019-12-04 DIAGNOSIS — C34.90 MALIGNANT NEOPLASM OF UNSPECIFIED PART OF UNSPECIFIED BRONCHUS OR LUNG: ICD-10-CM

## 2019-12-04 LAB
ALBUMIN SERPL ELPH-MCNC: 2.2 G/DL — LOW (ref 3.3–5)
ALP SERPL-CCNC: 70 U/L — SIGNIFICANT CHANGE UP (ref 40–120)
ALT FLD-CCNC: 23 U/L — SIGNIFICANT CHANGE UP (ref 12–78)
ANION GAP SERPL CALC-SCNC: 7 MMOL/L — SIGNIFICANT CHANGE UP (ref 5–17)
APPEARANCE UR: CLEAR — SIGNIFICANT CHANGE UP
AST SERPL-CCNC: 38 U/L — HIGH (ref 15–37)
BASE EXCESS BLDA CALC-SCNC: 8.4 MMOL/L — HIGH (ref -2–2)
BASOPHILS # BLD AUTO: 0.01 K/UL — SIGNIFICANT CHANGE UP (ref 0–0.2)
BASOPHILS NFR BLD AUTO: 0.1 % — SIGNIFICANT CHANGE UP (ref 0–2)
BILIRUB SERPL-MCNC: 0.3 MG/DL — SIGNIFICANT CHANGE UP (ref 0.2–1.2)
BILIRUB UR-MCNC: NEGATIVE — SIGNIFICANT CHANGE UP
BLOOD GAS COMMENTS ARTERIAL: SIGNIFICANT CHANGE UP
BLOOD GAS COMMENTS ARTERIAL: SIGNIFICANT CHANGE UP
BUN SERPL-MCNC: 16 MG/DL — SIGNIFICANT CHANGE UP (ref 7–23)
CALCIUM SERPL-MCNC: 9.9 MG/DL — SIGNIFICANT CHANGE UP (ref 8.5–10.1)
CHLORIDE SERPL-SCNC: 93 MMOL/L — LOW (ref 96–108)
CO2 SERPL-SCNC: 33 MMOL/L — HIGH (ref 22–31)
COLOR SPEC: YELLOW — SIGNIFICANT CHANGE UP
CREAT SERPL-MCNC: 0.56 MG/DL — SIGNIFICANT CHANGE UP (ref 0.5–1.3)
DIFF PNL FLD: ABNORMAL
EOSINOPHIL # BLD AUTO: 0 K/UL — SIGNIFICANT CHANGE UP (ref 0–0.5)
EOSINOPHIL NFR BLD AUTO: 0 % — SIGNIFICANT CHANGE UP (ref 0–6)
GLUCOSE SERPL-MCNC: 111 MG/DL — HIGH (ref 70–99)
GLUCOSE UR QL: NEGATIVE — SIGNIFICANT CHANGE UP
HCO3 BLDA-SCNC: 30 MMOL/L — HIGH (ref 23–27)
HCT VFR BLD CALC: 39.3 % — SIGNIFICANT CHANGE UP (ref 39–50)
HGB BLD-MCNC: 12.5 G/DL — LOW (ref 13–17)
HOROWITZ INDEX BLDA+IHG-RTO: 60 — SIGNIFICANT CHANGE UP
IMM GRANULOCYTES NFR BLD AUTO: 1 % — SIGNIFICANT CHANGE UP (ref 0–1.5)
INR BLD: 1.24 RATIO — HIGH (ref 0.88–1.16)
KETONES UR-MCNC: NEGATIVE — SIGNIFICANT CHANGE UP
LACTATE SERPL-SCNC: 3.8 MMOL/L — HIGH (ref 0.7–2)
LACTATE SERPL-SCNC: 4.3 MMOL/L — CRITICAL HIGH (ref 0.7–2)
LACTATE SERPL-SCNC: 4.4 MMOL/L — CRITICAL HIGH (ref 0.7–2)
LACTATE SERPL-SCNC: 4.5 MMOL/L — CRITICAL HIGH (ref 0.7–2)
LEUKOCYTE ESTERASE UR-ACNC: NEGATIVE — SIGNIFICANT CHANGE UP
LYMPHOCYTES # BLD AUTO: 1.01 K/UL — SIGNIFICANT CHANGE UP (ref 1–3.3)
LYMPHOCYTES # BLD AUTO: 11 % — LOW (ref 13–44)
MCHC RBC-ENTMCNC: 27.3 PG — SIGNIFICANT CHANGE UP (ref 27–34)
MCHC RBC-ENTMCNC: 31.8 GM/DL — LOW (ref 32–36)
MCV RBC AUTO: 85.8 FL — SIGNIFICANT CHANGE UP (ref 80–100)
MONOCYTES # BLD AUTO: 1 K/UL — HIGH (ref 0–0.9)
MONOCYTES NFR BLD AUTO: 10.8 % — SIGNIFICANT CHANGE UP (ref 2–14)
NEUTROPHILS # BLD AUTO: 7.11 K/UL — SIGNIFICANT CHANGE UP (ref 1.8–7.4)
NEUTROPHILS NFR BLD AUTO: 77.1 % — HIGH (ref 43–77)
NITRITE UR-MCNC: NEGATIVE — SIGNIFICANT CHANGE UP
NRBC # BLD: 0 /100 WBCS — SIGNIFICANT CHANGE UP (ref 0–0)
PCO2 BLDA: 69 MMHG — HIGH (ref 32–46)
PH BLDA: 7.32 — LOW (ref 7.35–7.45)
PH UR: 5 — SIGNIFICANT CHANGE UP (ref 5–8)
PLATELET # BLD AUTO: 470 K/UL — HIGH (ref 150–400)
PO2 BLDA: 85 MMHG — SIGNIFICANT CHANGE UP (ref 74–108)
POTASSIUM SERPL-MCNC: 4.6 MMOL/L — SIGNIFICANT CHANGE UP (ref 3.5–5.3)
POTASSIUM SERPL-SCNC: 4.6 MMOL/L — SIGNIFICANT CHANGE UP (ref 3.5–5.3)
PROT SERPL-MCNC: 6.5 G/DL — SIGNIFICANT CHANGE UP (ref 6–8.3)
PROT UR-MCNC: 75 MG/DL
PROTHROM AB SERPL-ACNC: 14.1 SEC — HIGH (ref 10–12.9)
RBC # BLD: 4.58 M/UL — SIGNIFICANT CHANGE UP (ref 4.2–5.8)
RBC # FLD: 12.8 % — SIGNIFICANT CHANGE UP (ref 10.3–14.5)
SAO2 % BLDA: 95 % — SIGNIFICANT CHANGE UP (ref 92–96)
SODIUM SERPL-SCNC: 133 MMOL/L — LOW (ref 135–145)
SP GR SPEC: 1.02 — SIGNIFICANT CHANGE UP (ref 1.01–1.02)
UROBILINOGEN FLD QL: NEGATIVE — SIGNIFICANT CHANGE UP
WBC # BLD: 9.22 K/UL — SIGNIFICANT CHANGE UP (ref 3.8–10.5)
WBC # FLD AUTO: 9.22 K/UL — SIGNIFICANT CHANGE UP (ref 3.8–10.5)

## 2019-12-04 PROCEDURE — 99223 1ST HOSP IP/OBS HIGH 75: CPT | Mod: AI

## 2019-12-04 PROCEDURE — 71250 CT THORAX DX C-: CPT | Mod: 26

## 2019-12-04 PROCEDURE — 12345: CPT | Mod: NC

## 2019-12-04 PROCEDURE — 71045 X-RAY EXAM CHEST 1 VIEW: CPT | Mod: 26

## 2019-12-04 PROCEDURE — 99285 EMERGENCY DEPT VISIT HI MDM: CPT

## 2019-12-04 PROCEDURE — 93010 ELECTROCARDIOGRAM REPORT: CPT

## 2019-12-04 RX ORDER — ROSUVASTATIN CALCIUM 5 MG/1
1 TABLET ORAL
Qty: 0 | Refills: 0 | DISCHARGE

## 2019-12-04 RX ORDER — ONDANSETRON 8 MG/1
4 TABLET, FILM COATED ORAL EVERY 6 HOURS
Refills: 0 | Status: DISCONTINUED | OUTPATIENT
Start: 2019-12-04 | End: 2019-12-05

## 2019-12-04 RX ORDER — ACETAMINOPHEN 500 MG
1000 TABLET ORAL ONCE
Refills: 0 | Status: COMPLETED | OUTPATIENT
Start: 2019-12-04 | End: 2019-12-04

## 2019-12-04 RX ORDER — SODIUM CHLORIDE 9 MG/ML
1000 INJECTION INTRAMUSCULAR; INTRAVENOUS; SUBCUTANEOUS
Refills: 0 | Status: DISCONTINUED | OUTPATIENT
Start: 2019-12-04 | End: 2019-12-05

## 2019-12-04 RX ORDER — MORPHINE SULFATE 50 MG/1
1 CAPSULE, EXTENDED RELEASE ORAL
Refills: 0 | Status: DISCONTINUED | OUTPATIENT
Start: 2019-12-04 | End: 2019-12-04

## 2019-12-04 RX ORDER — CLOPIDOGREL BISULFATE 75 MG/1
75 TABLET, FILM COATED ORAL AT BEDTIME
Refills: 0 | Status: DISCONTINUED | OUTPATIENT
Start: 2019-12-04 | End: 2019-12-04

## 2019-12-04 RX ORDER — LIDOCAINE 4 G/100G
1 CREAM TOPICAL DAILY
Refills: 0 | Status: DISCONTINUED | OUTPATIENT
Start: 2019-12-04 | End: 2019-12-05

## 2019-12-04 RX ORDER — METOPROLOL TARTRATE 50 MG
5 TABLET ORAL EVERY 6 HOURS
Refills: 0 | Status: DISCONTINUED | OUTPATIENT
Start: 2019-12-04 | End: 2019-12-05

## 2019-12-04 RX ORDER — ACETAMINOPHEN 500 MG
650 TABLET ORAL EVERY 6 HOURS
Refills: 0 | Status: DISCONTINUED | OUTPATIENT
Start: 2019-12-04 | End: 2019-12-04

## 2019-12-04 RX ORDER — IPRATROPIUM/ALBUTEROL SULFATE 18-103MCG
3 AEROSOL WITH ADAPTER (GRAM) INHALATION EVERY 6 HOURS
Refills: 0 | Status: DISCONTINUED | OUTPATIENT
Start: 2019-12-04 | End: 2019-12-05

## 2019-12-04 RX ORDER — CHOLECALCIFEROL (VITAMIN D3) 125 MCG
1 CAPSULE ORAL
Qty: 0 | Refills: 0 | DISCHARGE

## 2019-12-04 RX ORDER — METOPROLOL TARTRATE 50 MG
100 TABLET ORAL DAILY
Refills: 0 | Status: DISCONTINUED | OUTPATIENT
Start: 2019-12-04 | End: 2019-12-04

## 2019-12-04 RX ORDER — PANTOPRAZOLE SODIUM 20 MG/1
40 TABLET, DELAYED RELEASE ORAL
Refills: 0 | Status: DISCONTINUED | OUTPATIENT
Start: 2019-12-04 | End: 2019-12-04

## 2019-12-04 RX ORDER — MORPHINE SULFATE 50 MG/1
0.5 CAPSULE, EXTENDED RELEASE ORAL EVERY 4 HOURS
Refills: 0 | Status: DISCONTINUED | OUTPATIENT
Start: 2019-12-04 | End: 2019-12-05

## 2019-12-04 RX ORDER — SODIUM CHLORIDE 9 MG/ML
2300 INJECTION, SOLUTION INTRAVENOUS ONCE
Refills: 0 | Status: COMPLETED | OUTPATIENT
Start: 2019-12-04 | End: 2019-12-04

## 2019-12-04 RX ORDER — ENOXAPARIN SODIUM 100 MG/ML
40 INJECTION SUBCUTANEOUS DAILY
Refills: 0 | Status: DISCONTINUED | OUTPATIENT
Start: 2019-12-04 | End: 2019-12-04

## 2019-12-04 RX ORDER — ACETAMINOPHEN 500 MG
1000 TABLET ORAL EVERY 6 HOURS
Refills: 0 | Status: DISCONTINUED | OUTPATIENT
Start: 2019-12-04 | End: 2019-12-05

## 2019-12-04 RX ORDER — SODIUM CHLORIDE 9 MG/ML
1000 INJECTION, SOLUTION INTRAVENOUS ONCE
Refills: 0 | Status: COMPLETED | OUTPATIENT
Start: 2019-12-04 | End: 2019-12-04

## 2019-12-04 RX ORDER — PANTOPRAZOLE SODIUM 20 MG/1
1 TABLET, DELAYED RELEASE ORAL
Qty: 0 | Refills: 0 | DISCHARGE

## 2019-12-04 RX ORDER — PANTOPRAZOLE SODIUM 20 MG/1
40 TABLET, DELAYED RELEASE ORAL
Refills: 0 | Status: DISCONTINUED | OUTPATIENT
Start: 2019-12-04 | End: 2019-12-05

## 2019-12-04 RX ORDER — MORPHINE SULFATE 50 MG/1
1 CAPSULE, EXTENDED RELEASE ORAL
Refills: 0 | Status: DISCONTINUED | OUTPATIENT
Start: 2019-12-04 | End: 2019-12-05

## 2019-12-04 RX ORDER — VANCOMYCIN HCL 1 G
1000 VIAL (EA) INTRAVENOUS ONCE
Refills: 0 | Status: COMPLETED | OUTPATIENT
Start: 2019-12-04 | End: 2019-12-04

## 2019-12-04 RX ORDER — ATORVASTATIN CALCIUM 80 MG/1
20 TABLET, FILM COATED ORAL AT BEDTIME
Refills: 0 | Status: DISCONTINUED | OUTPATIENT
Start: 2019-12-04 | End: 2019-12-04

## 2019-12-04 RX ORDER — PIPERACILLIN AND TAZOBACTAM 4; .5 G/20ML; G/20ML
3.38 INJECTION, POWDER, LYOPHILIZED, FOR SOLUTION INTRAVENOUS ONCE
Refills: 0 | Status: COMPLETED | OUTPATIENT
Start: 2019-12-04 | End: 2019-12-04

## 2019-12-04 RX ADMIN — Medication 1000 MILLIGRAM(S): at 16:18

## 2019-12-04 RX ADMIN — Medication 5 MILLIGRAM(S): at 17:58

## 2019-12-04 RX ADMIN — PIPERACILLIN AND TAZOBACTAM 200 GRAM(S): 4; .5 INJECTION, POWDER, LYOPHILIZED, FOR SOLUTION INTRAVENOUS at 11:51

## 2019-12-04 RX ADMIN — MORPHINE SULFATE 0.5 MILLIGRAM(S): 50 CAPSULE, EXTENDED RELEASE ORAL at 22:12

## 2019-12-04 RX ADMIN — SODIUM CHLORIDE 2300 MILLILITER(S): 9 INJECTION, SOLUTION INTRAVENOUS at 15:07

## 2019-12-04 RX ADMIN — SODIUM CHLORIDE 2300 MILLILITER(S): 9 INJECTION, SOLUTION INTRAVENOUS at 11:52

## 2019-12-04 RX ADMIN — Medication 3 MILLILITER(S): at 15:55

## 2019-12-04 RX ADMIN — MORPHINE SULFATE 0.5 MILLIGRAM(S): 50 CAPSULE, EXTENDED RELEASE ORAL at 23:21

## 2019-12-04 RX ADMIN — Medication 400 MILLIGRAM(S): at 16:03

## 2019-12-04 RX ADMIN — Medication 250 MILLIGRAM(S): at 13:48

## 2019-12-04 RX ADMIN — MORPHINE SULFATE 1 MILLIGRAM(S): 50 CAPSULE, EXTENDED RELEASE ORAL at 23:29

## 2019-12-04 RX ADMIN — SODIUM CHLORIDE 1000 MILLILITER(S): 9 INJECTION, SOLUTION INTRAVENOUS at 17:21

## 2019-12-04 RX ADMIN — Medication 5 MILLIGRAM(S): at 23:52

## 2019-12-04 RX ADMIN — PANTOPRAZOLE SODIUM 40 MILLIGRAM(S): 20 TABLET, DELAYED RELEASE ORAL at 17:59

## 2019-12-04 RX ADMIN — PIPERACILLIN AND TAZOBACTAM 3.38 GRAM(S): 4; .5 INJECTION, POWDER, LYOPHILIZED, FOR SOLUTION INTRAVENOUS at 12:34

## 2019-12-04 RX ADMIN — SODIUM CHLORIDE 60 MILLILITER(S): 9 INJECTION INTRAMUSCULAR; INTRAVENOUS; SUBCUTANEOUS at 15:31

## 2019-12-04 RX ADMIN — LIDOCAINE 1 PATCH: 4 CREAM TOPICAL at 17:58

## 2019-12-04 RX ADMIN — LIDOCAINE 1 PATCH: 4 CREAM TOPICAL at 20:46

## 2019-12-04 NOTE — ED ADULT NURSE NOTE - NSIMPLEMENTINTERV_GEN_ALL_ED
Implemented All Fall with Harm Risk Interventions:  [place] to call system. Call bell, personal items and telephone within reach. Instruct patient to call for assistance. Room bathroom lighting operational. Non-slip footwear when patient is off stretcher. Physically safe environment: no spills, clutter or unnecessary equipment. Stretcher in lowest position, wheels locked, appropriate side rails in place. Provide visual cue, wrist band, yellow gown, etc. Monitor gait and stability. Monitor for mental status changes and reorient to person, place, and time. Review medications for side effects contributing to fall risk. Reinforce activity limits and safety measures with patient and family. Provide visual clues: red socks.

## 2019-12-04 NOTE — H&P ADULT - HISTORY OF PRESENT ILLNESS
89y/o M. with hx of HTN, HLD, heart block with PPM, recently diagnosed non small cell lung CA, Prostate CA, and TIA presenting with generalized weakness.  Pt. had 4-5 episodes of diarrhea this past Monday which had resolved.  The following day he had N/V and decreased PO intake.  He has become very fatigued and has not been able to ambulate.  Family also reports cough productive of yellow sputum and SOB.  In ED patient had CT chest with interval worsening of right circumferential masslike pleural thickening and right apical mass. Stable diffuse narrowing of the right mainstem   bronchus.  Interval enlargement of 2 hypodense lesions in the left hepatic lobe compared to 11/8/2019 and concerning for metastatic lesions.  Pt. denies fever, CP, abdominal pain, or dysuria.  Rest of ROS negative.

## 2019-12-04 NOTE — SWALLOW BEDSIDE ASSESSMENT ADULT - COMMENTS
Pt received upright in bed, awake and cooperative, on supplemental O2 via HFNC (40LPM, 90% FiO2). Pt followed simple commands. Pt's vocal quality was hoarse with occasional periods of strain, reduced breath support. Pt's daughter present and reported pt has a baseline diet of regular/thin, but recently had episode of gagging when taking PO meds and thin liquids.    CT chest revealed: "Interval worsening of right circumferential masslike pleural thickening and right apical mass. Stable diffuse narrowing of the right mainstem bronchus. Interval enlargement of 2 hypodense lesions in the left hepatic lobe compared to 11/8/2019 and concerning for metastatic lesions."

## 2019-12-04 NOTE — ED PROVIDER NOTE - ENMT, MLM
Airway patent, Nasal mucosa clear. Mouth with normal mucosa. Throat has no vesicles, no oropharyngeal exudates and uvula is midline. on nasal canula

## 2019-12-04 NOTE — SWALLOW BEDSIDE ASSESSMENT ADULT - SWALLOW EVAL: RECOMMENDED DIET
oral nutrition/hydration/medication is contraindicated, consider short-term non-oral means and GOC discussion

## 2019-12-04 NOTE — ED PROVIDER NOTE - OBJECTIVE STATEMENT
89yo M, w/ PMH/o HTN, HLD, carotid artery disease, heart block s/p PPM, h/o TIAs (last episode ~5yrs ago), h/o prostate cancer, h/o diverticulitis, gastritis, with recent diagnosis of lung cancer (11/19) on home O2 (3L nasal canula) presents for weakness. As per daughter at bedside, patient had diarrhea for the past 3 days with 1 day of nonbloody, nonbilious vomiting. Patient has also been having more coughing with yellow sputum. Was seen for a follow up appointment at [place] yesterday and given levofloxacin which he did not start as his daughter states she was too afraid to give it to him because he was choking on his other pills this morning. Patient admits to significant shortness of breath and weakness. Denies abdominal pain or chest pain.

## 2019-12-04 NOTE — ED CLERICAL - NS ED CLERK NOTE PRE-ARRIVAL INFORMATION; ADDITIONAL PRE-ARRIVAL INFORMATION
This patient is enrolled in the Care Transitions program and has active care navigation. This patient can be followed up by the care navigation team within 24 hours. To arrange close follow-up or to obtain additional clinical information about this patient, please call the contact number above.

## 2019-12-04 NOTE — ED PROVIDER NOTE - PSH
History of cataract surgery  bilateral  Pacemaker  [employer] Model # [id number] Serial #[id number]  implant date 05/20/2011  S/P cholecystectomy

## 2019-12-04 NOTE — ED ADULT NURSE REASSESSMENT NOTE - NS ED NURSE REASSESS COMMENT FT1
Report to ERLINDA Schultz. Per discussion w/ MD Hill regarding trending of lactate and high flow pt upgraded to telemetry floor. Pending bed assignment.
Pt now awake and alert, oriented x 4, able to verbalized needs. Pt denies any complaint at this time. Pending transfer to bed assignment.

## 2019-12-04 NOTE — H&P ADULT - NSICDXPASTMEDICALHX_GEN_ALL_CORE_FT
PAST MEDICAL HISTORY:  Carpal tunnel syndrome     Diverticulitis     GERD (gastroesophageal reflux disease)     Heart block     HTN (hypertension)     Hyperlipidemia     Lung cancer     Pacemaker 2007    Prostate cancer     TIA (transient ischemic attack) last TIA over 5 years ago as per patient

## 2019-12-04 NOTE — SWALLOW BEDSIDE ASSESSMENT ADULT - SLP PERTINENT HISTORY OF CURRENT PROBLEM
Per charting, "89y/o M. with hx of HTN, HLD, heart block with PPM, recently diagnosed non small cell lung CA, Prostate CA, and TIA presenting with generalized weakness.  Pt. had 4-5 episodes of diarrhea this past Monday which had resolved.  The following day he had N/V and decreased PO intake.  He has become very fatigued and has not been able to ambulate.  Family also reports cough productive of yellow sputum and SOB."

## 2019-12-04 NOTE — ED PROVIDER NOTE - PMH
Carpal tunnel syndrome    Diverticulitis    GERD (gastroesophageal reflux disease)    Heart block    HTN (hypertension)    Hyperlipidemia    Lung cancer    Pacemaker  2007  Prostate cancer    TIA (transient ischemic attack)  last TIA over 5 years ago as per patient

## 2019-12-04 NOTE — SWALLOW BEDSIDE ASSESSMENT ADULT - SWALLOW EVAL: DIAGNOSIS
Pt p/w 1. Mild oral dysphagia when given puree and honey thick liquids marked by adequate bolus retrieval and containment, prolonged bolus manipulation with delayed transfer, and adequate oral clearance post swallow. 2. Severe pharyngeal dysphagia when given puree and honey thick liquids marked by suspected delayed/discoordinated swallow onset, reduced laryngeal elevation to palpation, increased WOB, and immediate/weak cough response. 3. Oral nutrition/hydration/medication is contraindicated at this time, consider short-term non-oral means and GOC discussion. Consider reconsult as pt's respiratory status improves.

## 2019-12-04 NOTE — CONSULT NOTE ADULT - SUBJECTIVE AND OBJECTIVE BOX
Date/Time Patient Seen:  		  Referring MD:   Data Reviewed	       Patient is a 89y old  Male who presents with a chief complaint of Generalized weakness and SOB (04 Dec 2019 14:01)      Subjective/HPI    in bed  seen and examined  vs and meds reviewed    H and P reviewed  well known to me from prior admissions  lung ca - new dx    spoke with dtr and wife and son and patient    History and Physical:   Source of Information	Patient, Other Family Member  Outpatient Providers	PMD: Ingris     Language:  · Patient/Family of Limited English Proficiency	No     Patient Identity:  · Birth Sex	Unknown       History of Present Illness:  Reason for Admission: Generalized weakness and SOB  History of Present Illness:   89y/o M. with hx of HTN, HLD, heart block with PPM, recently diagnosed non small cell lung CA, Prostate CA, and TIA presenting with generalized weakness.  Pt. had 4-5 episodes of diarrhea this past Monday which had resolved.  The following day he had N/V and decreased PO intake.  He has become very fatigued and has not been able to ambulate.  Family also reports cough productive of yellow sputum and SOB.  In ED patient had CT chest with interval worsening of right circumferential masslike pleural thickening and right apical mass. Stable diffuse narrowing of the right mainstem   bronchus.  Interval enlargement of 2 hypodense lesions in the left hepatic lobe compared to 11/8/2019 and concerning for metastatic lesions.  Pt. denies fever, CP, abdominal pain, or dysuria.  Rest of ROS negative.      TIA (transient ischemic attack) last TIA over 5 years ago as per patient.     PAST SURGICAL HISTORY:  History of cataract surgery bilateral    Pacemaker [employer] Model # [id number] Serial #[id number]  implant date 05/20/2011    S/P cholecystectomy.     Social History:  Social History (marital status, living situation, occupation, tobacco use, alcohol and drug use, and sexual history): Quit tobacco use 50 years ago.  Prior had 1ppd history.    Denies Etoh or IVDA.     Tobacco Screening:  · Core Measure Site	Yes  · Has the patient used tobacco in the past 30 days?	No    Risk Assessment:    Present on Admission:  Deep Venous Thrombosis	no  Pulmonary Embolus	no       PAST MEDICAL & SURGICAL HISTORY:  Lung cancer  Pacemaker: 2007  Diverticulitis  TIA (transient ischemic attack): last TIA over 5 years ago as per patient  Prostate cancer  Carpal tunnel syndrome  HTN (hypertension)  Hyperlipidemia  GERD (gastroesophageal reflux disease)  Heart block  History of cataract surgery: bilateral  S/P cholecystectomy  Pacemaker: [employer] Model # [id number] Serial #[id number]  implant date 05/20/2011        Medication list         MEDICATIONS  (STANDING):  acetaminophen   Tablet .. 1000 milliGRAM(s) Oral every 6 hours  albuterol/ipratropium for Nebulization 3 milliLiter(s) Nebulizer every 6 hours  enoxaparin Injectable 40 milliGRAM(s) SubCutaneous daily  lidocaine   Patch 1 Patch Transdermal daily  lidocaine   Patch 1 Patch Transdermal daily  metoprolol tartrate Injectable 5 milliGRAM(s) IV Push every 6 hours  pantoprazole  Injectable 40 milliGRAM(s) IV Push two times a day  sodium chloride 0.9%. 1000 milliLiter(s) (60 mL/Hr) IV Continuous <Continuous>    MEDICATIONS  (PRN):  morphine  - Injectable 0.5 milliGRAM(s) IV Push every 4 hours PRN pain, dyspnea, distress, suffering,  ondansetron Injectable 4 milliGRAM(s) IV Push every 6 hours PRN Nausea and/or Vomiting         Vitals log        ICU Vital Signs Last 24 Hrs  T(C): 37.1 (04 Dec 2019 11:15), Max: 37.1 (04 Dec 2019 11:15)  T(F): 98.8 (04 Dec 2019 11:15), Max: 98.8 (04 Dec 2019 11:15)  HR: 103 (04 Dec 2019 14:00) (103 - 110)  BP: 150/74 (04 Dec 2019 14:00) (138/83 - 150/74)  BP(mean): --  ABP: --  ABP(mean): --  RR: 24 (04 Dec 2019 17:22) (22 - 24)  SpO2: 98% (04 Dec 2019 17:22) (92% - 98%)           Input and Output:  I&O's Detail      Lab Data                        12.5   9.22  )-----------( 470      ( 04 Dec 2019 11:20 )             39.3     12-04    133<L>  |  93<L>  |  16  ----------------------------<  111<H>  4.6   |  33<H>  |  0.56    Ca    9.9      04 Dec 2019 12:20    TPro  6.5  /  Alb  2.2<L>  /  TBili  0.3  /  DBili  x   /  AST  38<H>  /  ALT  23  /  AlkPhos  70  12-04    ABG - ( 04 Dec 2019 12:21 )  pH, Arterial: 7.32  pH, Blood: x     /  pCO2: 69    /  pO2: 85    / HCO3: 30    / Base Excess: 8.4   /  SaO2: 95                      Review of Systems	  sob  basilio  weakness      Objective     Physical Examination  frail  weak  lung dec BS  abd soft  frail  weak  head nc      Pertinent Lab findings & Imaging      Dubois:  NO   Adequate UO     I&O's Detail           Discussed with:     Cultures:	        Radiology  cxr  lung ca  eff  atelectasis

## 2019-12-04 NOTE — ED PROVIDER NOTE - ATTENDING CONTRIBUTION TO CARE
Elderly white male with Lung Ca now with weakness, cough, SOB and vomiting. Exam revealed white male lethargic with diminished BS on right, normal heart sounds and benign abdomen. Extremities revealed edema BLLE. I agree with plan and management outlined by R-1.

## 2019-12-04 NOTE — CONSULT NOTE ADULT - PROBLEM SELECTOR RECOMMENDATION 9
lung ca - new dx - pt is dying - actively dying - hypoxemia - pain - frailty and functional decline -   may assess for ABX therapy - no clear indication of PNA - however - radiographic imaging is obscured by progressive and aggressive form of lung ca -   pt remains full code - discussed GOC with wife and patient and son and dtr -   IVF  pain regimen - tylenol - lidoderm - morphine IV prn -   supportive medical regimen  discussed [facility] -   spoke with pt's Onc MD - Dr. Tidwell -   prognosis is extremely poor - 89 y/o male with progressive lung ca  if pt remains full code - and cont to decline - may need high flow NC vs NIPPV - which I would not recommend - as it will not be therapeutic - but rather extend suffering and misery -   above discussed in detail with family

## 2019-12-04 NOTE — SWALLOW BEDSIDE ASSESSMENT ADULT - PHARYNGEAL PHASE
Decreased laryngeal elevation/Delayed pharyngeal swallow/increased WOB post swallow/Cough post oral intake

## 2019-12-04 NOTE — ED ADULT NURSE NOTE - OBJECTIVE STATEMENT
Pt BIB EMS c/c of worsening lethargy. per family pt w/ diarrhea on Monday w/ resolved w/o intervention and nausea and vomiting yesterday since resolved. Now presents w/ lethargy and generalized weakness since last night. Per family, pt w/ recent diagnosis of Lung CA, now on home o2 @ 2L. Denies fever at home, denies pain

## 2019-12-04 NOTE — H&P ADULT - NSICDXPASTSURGICALHX_GEN_ALL_CORE_FT
PAST SURGICAL HISTORY:  History of cataract surgery bilateral    Pacemaker [employer] Model # [id number] Serial #[id number]  implant date 05/20/2011    S/P cholecystectomy

## 2019-12-04 NOTE — ED ADULT NURSE NOTE - PSH
History of cataract surgery  bilateral  Pacemaker  [employer] Model # [id number] Serial #[id number]  implant date 05/20/2011  S/P cholecystectomy

## 2019-12-04 NOTE — ED PROVIDER NOTE - CARE PLAN
Principal Discharge DX:	Pneumonia of right lung due to infectious organism, unspecified part of lung  Secondary Diagnosis:	Pleural effusion  Secondary Diagnosis:	Malignant neoplasm of right lung, unspecified part of lung

## 2019-12-04 NOTE — SWALLOW BEDSIDE ASSESSMENT ADULT - ASR SWALLOW ASPIRATION MONITOR
fever/pneumonia/upper respiratory infection/position upright (90Y)/cough/gurgly voice/change of breathing pattern/oral hygiene/throat clearing

## 2019-12-04 NOTE — GOALS OF CARE CONVERSATION - ADVANCED CARE PLANNING - CONVERSATION DETAILS
GOC discussion was held by me with patients wife, daughter and son at bedside with ICU FREDA Milner present. pts daughter is the health care proxy. discussed prognosis and treatment options. At this time family desires to make patient DNR/DNI and comfort measures only. They want to continue with the high flow oxygen until all of family are present. continue with morphine prn distress/dyspnea.

## 2019-12-04 NOTE — H&P ADULT - ASSESSMENT
89y/o M. with hx of HTN, HLD, heart block with PPM, non small cell lung cancer on home O2, Prostate CA, and TIA resenting with generalized weakness, cough, and SOB.    -Generalized weakness with cough and SOB:  likely related to recent diagnosis of non small cell lung CA.  Pt. afebrile and with no elevation in WBC.  Will hold off on further antibiotics for now.  Check procalcitonin and repeat lactate.  Duoneb tx Q6h and O2 support.   Pulmonary and Heme/Onc consult requested.  Swallow evaluation.   -Diarrhea:  Per family improved after one day.  If further diarrhea consider checking Stool C&S/GI PCR.  s/p LR bolus in ED.    -HTN:  continue Toprol XL 100mg PO daily  -HLD:  continue statin therapy  -TIA:  continue Plavix 75mg PO QHS and statin   -VTE ppx: Lovenox 40mg sQ daily    IMPROVE VTE Individual Risk Assessment          RISK                                                          Points  [  ] Previous VTE                                                3  [  ] Thrombophilia                                             2  [  ] Lower limb paralysis                                   2        (unable to hold up >15 seconds)    [ x] Current Cancer                                             2         (within 6 months)  [  ] Immobilization > 24 hrs                              1  [  ] ICU/CCU stay > 24 hours                             1  [ x] Age > 60                                                         1    IMPROVE VTE Score: 3 89y/o M. with hx of HTN, HLD, heart block with PPM, non small cell lung cancer on home O2, Prostate CA, and TIA resenting with generalized weakness, cough, and SOB.    -Generalized weakness with cough and SOB:  likely related to recent diagnosis of non small cell lung CA.  Pt. afebrile and with no elevation in WBC.  Will hold off on further antibiotics for now.  Check procalcitonin and repeat lactate.  Duoneb tx Q6h and O2 support.   Pulmonary and Heme/Onc consult requested.  Swallow evaluation.   -Diarrhea:  Per family improved after one day.  If further diarrhea consider checking Stool C&S/GI PCR.  s/p LR bolus in ED.    -LE edema:  check LE doppler evaluation for DVT.   -HTN:  continue Toprol XL 100mg PO daily  -HLD:  continue statin therapy  -TIA:  continue Plavix 75mg PO QHS and statin   -VTE ppx: Lovenox 40mg sQ daily    IMPROVE VTE Individual Risk Assessment          RISK                                                          Points  [  ] Previous VTE                                                3  [  ] Thrombophilia                                             2  [  ] Lower limb paralysis                                   2        (unable to hold up >15 seconds)    [ x] Current Cancer                                             2         (within 6 months)  [  ] Immobilization > 24 hrs                              1  [  ] ICU/CCU stay > 24 hours                             1  [ x] Age > 60                                                         1    IMPROVE VTE Score: 3

## 2019-12-04 NOTE — ED PROVIDER NOTE - CLINICAL SUMMARY MEDICAL DECISION MAKING FREE TEXT BOX
Cough, sob and vomiting/weakness x worsening days requiring evaluation, labs, cxr and IVFs and meds.

## 2019-12-05 VITALS — DIASTOLIC BLOOD PRESSURE: 90 MMHG | SYSTOLIC BLOOD PRESSURE: 162 MMHG | HEART RATE: 108 BPM

## 2019-12-05 DIAGNOSIS — C34.91 MALIGNANT NEOPLASM OF UNSPECIFIED PART OF RIGHT BRONCHUS OR LUNG: ICD-10-CM

## 2019-12-05 LAB
CULTURE RESULTS: NO GROWTH — SIGNIFICANT CHANGE UP
SPECIMEN SOURCE: SIGNIFICANT CHANGE UP

## 2019-12-05 PROCEDURE — 94640 AIRWAY INHALATION TREATMENT: CPT

## 2019-12-05 PROCEDURE — 85610 PROTHROMBIN TIME: CPT

## 2019-12-05 PROCEDURE — 99285 EMERGENCY DEPT VISIT HI MDM: CPT | Mod: 25

## 2019-12-05 PROCEDURE — 81001 URINALYSIS AUTO W/SCOPE: CPT

## 2019-12-05 PROCEDURE — 96365 THER/PROPH/DIAG IV INF INIT: CPT

## 2019-12-05 PROCEDURE — 96367 TX/PROPH/DG ADDL SEQ IV INF: CPT

## 2019-12-05 PROCEDURE — 92610 EVALUATE SWALLOWING FUNCTION: CPT

## 2019-12-05 PROCEDURE — 83605 ASSAY OF LACTIC ACID: CPT

## 2019-12-05 PROCEDURE — 80053 COMPREHEN METABOLIC PANEL: CPT

## 2019-12-05 PROCEDURE — 36415 COLL VENOUS BLD VENIPUNCTURE: CPT

## 2019-12-05 PROCEDURE — 93005 ELECTROCARDIOGRAM TRACING: CPT

## 2019-12-05 PROCEDURE — 82803 BLOOD GASES ANY COMBINATION: CPT

## 2019-12-05 PROCEDURE — 82962 GLUCOSE BLOOD TEST: CPT

## 2019-12-05 PROCEDURE — 86850 RBC ANTIBODY SCREEN: CPT

## 2019-12-05 PROCEDURE — 86900 BLOOD TYPING SEROLOGIC ABO: CPT

## 2019-12-05 PROCEDURE — 87086 URINE CULTURE/COLONY COUNT: CPT

## 2019-12-05 PROCEDURE — 87040 BLOOD CULTURE FOR BACTERIA: CPT

## 2019-12-05 PROCEDURE — 85027 COMPLETE CBC AUTOMATED: CPT

## 2019-12-05 PROCEDURE — 86901 BLOOD TYPING SEROLOGIC RH(D): CPT

## 2019-12-05 PROCEDURE — 71045 X-RAY EXAM CHEST 1 VIEW: CPT

## 2019-12-05 PROCEDURE — 94760 N-INVAS EAR/PLS OXIMETRY 1: CPT

## 2019-12-05 PROCEDURE — 84145 PROCALCITONIN (PCT): CPT

## 2019-12-05 PROCEDURE — 71250 CT THORAX DX C-: CPT

## 2019-12-05 RX ORDER — ATROPINE SULFATE 1 %
1 DROPS OPHTHALMIC (EYE) EVERY 4 HOURS
Refills: 0 | Status: DISCONTINUED | OUTPATIENT
Start: 2019-12-05 | End: 2019-12-05

## 2019-12-05 RX ADMIN — Medication 3 MILLILITER(S): at 01:51

## 2019-12-05 RX ADMIN — MORPHINE SULFATE 1 MILLIGRAM(S): 50 CAPSULE, EXTENDED RELEASE ORAL at 00:32

## 2019-12-05 NOTE — PATIENT PROFILE ADULT - NSASFUNCLEVELADLTOILET_GEN_A_NUR
Physician Documentation                                                                           

 [facility]                                                                

Name: Fabian Perez                                                                             

Age: 4 yrs                                                                                        

Sex: Male                                                                                         

: 2013                                                                                   

MRN: [id number]                                                                                   

Arrival Date: 2018                                                                          

Time: 19:37                                                                                       

Account#: [id number]                                                                            

Bed 12                                                                                            

Private MD: Crow Joyner W                                                                

ED Physician Pedro Shearer                                                                      

HPI:                                                                                              

                                                                                             

20:28 This 4 yrs old  Male presents to ER via Ambulatory with complaints of Facial   jmm 

      Injury.                                                                                     

20:28 The patient or guardian reports abrasion, injury. The complaints affect the left cheek. jmm 

      Onset: The symptoms/episode began/occurred acutely, at 09:00. Associated signs and          

      symptoms: Loss of consciousness: This patient did not experience any loss of                

      consciousness. Pertinent negatives: seizure, vomiting. This is a 4 year old male with       

      no chronic medical conditions that presents to the ED. Patient hit his face againts a       

      bookshelf at approx 0900. Mother denies vomiting, LOC, behavior change today.        

      notified mother that the patient had developed swelling to the area with a black eye.       

      Mother states the patient has been acting appropriately today. .                            

                                                                                                  

Historical:                                                                                       

- Allergies:                                                                                      

19:49 Amoxicillin;                                                                            aj1 

- Home Meds:                                                                                      

19:49 None [Active];                                                                          aj1 

- PMHx:                                                                                           

19:49 None;                                                                                   aj1 

- PSHx:                                                                                           

19:49 tubes in his ears;                                                                      aj1 

                                                                                                  

- Immunization history:: Childhood immunizations are up to date.                                  

- Ebola Screening: : Patient denies travel to an Ebola-affected area in the 21 days               

  before illness onset.                                                                           

                                                                                                  

                                                                                                  

ROS:                                                                                              

20:28 Constitutional: Negative for fever, chills                                              jmm 

20:28 Abdomen/GI: Positive for vomiting.                                                          

20:28 Neuro: Negative for altered mental status, gait disturbance, loss of consciousness,         

      seizure activity.                                                                           

20:28 All other systems are negative.                                                             

                                                                                                  

Exam:                                                                                             

20:28 Chest/axilla:  Normal symmetrical motion.  No tenderness.  No crepitus.  No axillary    jmm 

      masses or tenderness. Cardiovascular:  Regular rate, no cyanosis Respiratory:  No           

      respiratory distress appreciated, no increased work of breathing, no nasal flaring          

      appreciated                                                                                 

20:28 Constitutional: The patient appears in no acute distress, alert, awake.                     

20:28 Head/face: swelling with ecchymosis noted to the cheek and the left inferior orbital        

      region, no bony tenderness is apprecaited, no battles sign is appreciated.                  

20:28 Eyes: Extraocular movements: intact throughout.                                             

20:28 ENT: TM's: are normal, hemotympanum, is not appreciated, bilaterally.                       

20:28 Skin: ecchymosis noted to the left cheek with swelling.                                     

20:28 Neuro: Motor: is normal, Gait: is steady.                                                   

                                                                                                  

Vital Signs:                                                                                      

19:49 Pulse 91; Resp 20; Temp 97.2; Pulse Ox 100% on R/A; Weight 20.47 kg (M);                aj1 

                                                                                                  

MDM:                                                                                              

20:12 Patient medically screened.                                                             [facility] 

20:22 Data reviewed: vital signs, nurses notes. Counseling: I had a detailed discussion with  lindsay 

      the patient and/or guardian regarding: the historical points, exam findings, and any        

      diagnostic results supporting the discharge/admit diagnosis, the need for outpatient        

      follow up, to return to the emergency department if symptoms worsen or persist or if        

      there are any questions or concerns that arise at home.                                     

20:28 ED course: ESTELITA does not recommend CT imaging. Mother given head injury return        [facility] 

      precautions. I do not suspect orbital fracture, no bony tenderness, EOMI. .                 

                                                                                                  

Administered Medications:                                                                         

No medications were administered                                                                  

                                                                                                  

                                                                                                  

Disposition:                                                                                      

                                                                                             

06:48 Co-signature as Attending Physician, Pedro Shearer MD I agree with the assessment and  [facility] 

      plan of care.                                                                               

                                                                                                  

Disposition:                                                                                      

18 20:24 Discharged to Home. Impression: Contusion of other part of head.                   

- Condition is Stable.                                                                            

- Discharge Instructions: Facial or Scalp Contusion, Head Injury, Pediatric.                      

                                                                                                  

- Medication Reconciliation Form, Thank You Letter, Antibiotic Education, Prescription            

  Opioid Use form.                                                                                

- Follow up: Crow Joyner MD; When: 1 - 2 days; Reason: Recheck today's                   

  complaints, Continuance of care, Re-evaluation by your physician.                               

                                                                                                  

                                                                                                  

                                                                                                  

Signatures:                                                                                       

Jennie Alba RN                     RN   aj1                                                  

Pedro Shearer MD MD cha Mickail, Joel, PA PA   [facility]                                                  

                                                                                                  

Corrections: (The following items were deleted from the chart)                                    

                                                                                             

20:38 20:24 2018 20:24 Discharged to Home. Impression: Contusion of other part of head. aj1 

      Condition is Stable. Forms are Medication Reconciliation Form, Thank You Letter,            

      Antibiotic Education, Prescription Opioid Use. Follow up: Crow Joyner; When: 1 -     

      2 days; Reason: Recheck today's complaints, Continuance of care, Re-evaluation by your      

      physician. lindsay                                                                              

                                                                                                  

************************************************************************************************** 4 = completely dependent

## 2019-12-05 NOTE — PROGRESS NOTE ADULT - PROBLEM SELECTOR PLAN 1
pt is actively dying -   family at BS  pt is DNR DNI now  on o2 support - will assess on NC 02 support  morphine and tylenol and lidoderm for pain  pt is appropriate for [facility] transfer - eval pending -   family at bedside - discussed prognosis   lung ca - progressive - aggressive tumor with paramalignant eff  prognosis is very poor  supportive medical regimen  GOC discussed and documented

## 2019-12-05 NOTE — PROGRESS NOTE ADULT - SUBJECTIVE AND OBJECTIVE BOX
Date/Time Patient Seen:  		  Referring MD:   Data Reviewed	       Patient is a 89y old  Male who presents with a chief complaint of Generalized weakness and SOB (04 Dec 2019 17:31)      Subjective/HPI     PAST MEDICAL & SURGICAL HISTORY:  Lung cancer  Pacemaker: 2007  Diverticulitis  TIA (transient ischemic attack): last TIA over 5 years ago as per patient  Prostate cancer  Carpal tunnel syndrome  HTN (hypertension)  Hyperlipidemia  GERD (gastroesophageal reflux disease)  Heart block  History of cataract surgery: bilateral  S/P cholecystectomy  Pacemaker: [employer] Model # [id number] Serial #[id number]  implant date 05/20/2011        Medication list         MEDICATIONS  (STANDING):  acetaminophen   Tablet .. 1000 milliGRAM(s) Oral every 6 hours  albuterol/ipratropium for Nebulization 3 milliLiter(s) Nebulizer every 6 hours  atropine 1% Ophthalmic Solution for SubLingual Use 1 Drop(s) SubLingual every 4 hours  lidocaine   Patch 1 Patch Transdermal daily  lidocaine   Patch 1 Patch Transdermal daily  pantoprazole  Injectable 40 milliGRAM(s) IV Push two times a day    MEDICATIONS  (PRN):  morphine  - Injectable 0.5 milliGRAM(s) IV Push every 4 hours PRN pain, dyspnea, distress, suffering,  morphine  - Injectable 1 milliGRAM(s) IV Push every 1 hour PRN distress  ondansetron Injectable 4 milliGRAM(s) IV Push every 6 hours PRN Nausea and/or Vomiting         Vitals log        ICU Vital Signs Last 24 Hrs  T(C): 36.3 (05 Dec 2019 00:25), Max: 37.1 (04 Dec 2019 11:15)  T(F): 97.3 (05 Dec 2019 00:25), Max: 98.8 (04 Dec 2019 11:15)  HR: 108 (05 Dec 2019 00:51) (103 - 119)  BP: 162/90 (05 Dec 2019 00:51) (133/65 - 165/103)  BP(mean): --  ABP: --  ABP(mean): --  RR: 20 (05 Dec 2019 00:25) (20 - 24)  SpO2: 96% (05 Dec 2019 00:25) (92% - 98%)           Input and Output:  I&O's Detail      Lab Data                        12.5   9.22  )-----------( 470      ( 04 Dec 2019 11:20 )             39.3     12-04    133<L>  |  93<L>  |  16  ----------------------------<  111<H>  4.6   |  33<H>  |  0.56    Ca    9.9      04 Dec 2019 12:20    TPro  6.5  /  Alb  2.2<L>  /  TBili  0.3  /  DBili  x   /  AST  38<H>  /  ALT  23  /  AlkPhos  70  12-04    ABG - ( 04 Dec 2019 12:21 )  pH, Arterial: 7.32  pH, Blood: x     /  pCO2: 69    /  pO2: 85    / HCO3: 30    / Base Excess: 8.4   /  SaO2: 95                      Review of Systems	      Objective     Physical Examination    head at  heart s1s2  lung dec BS  oral secretions      Pertinent Lab findings & Imaging      Sherly:  NO   Adequate UO     I&O's Detail           Discussed with:     Cultures:	        Radiology

## 2019-12-05 NOTE — CHART NOTE - NSCHARTNOTEFT_GEN_A_CORE
Expiration Note- PGY1 On Call    Assessed patient at bedside as patient appears to not be breathing/ is pulseless. Pt unresponsive to verbal, physical and painful stimuli.    Pupils were not reactive.  There was no corneal reflex.  There was no carotid or femoral pulse. There were no heart or lung sounds.     Time of death: 06:28 December 5, 2019  Attending: Dr Hill notified/ at bedside.   Family members notified, they were at bedside    -Diana Nolasco   PGY1

## 2019-12-05 NOTE — DISCHARGE NOTE FOR THE EXPIRED PATIENT - HOSPITAL COURSE
89y/o M. with hx of HTN, HLD, heart block with PPM, recently diagnosed non small cell lung CA, Prostate CA, and TIA presenting with generalized weakness.  Pt. had 4-5 episodes of diarrhea this past Monday which had resolved.  The following day he had N/V and decreased PO intake.  He has become very fatigued and has not been able to ambulate.  Family also reports cough productive of yellow sputum and SOB.  In ED patient had CT chest with interval worsening of right circumferential masslike pleural thickening and right apical mass. Stable diffuse narrowing of the right mainstem bronchus.  Interval enlargement of 2 hypodense lesions in the left hepatic lobe compared to 11/8/2019 and concerning for metastatic lesions.  Pt. denies fever, CP, abdominal pain, or dysuria.   Patient was admitted to med/surg. Patient was evaluated by pulmonology. A GOC discussion was held with family and a decision was made to make him comfort care. Patient was pronounced at 06:28 on 12/05/2019 89y/o M. with hx of HTN, HLD, heart block with PPM, recently diagnosed non small cell lung CA, Prostate CA, and TIA presenting with generalized weakness.  Pt. had 4-5 episodes of diarrhea this past Monday which had resolved.  The following day he had N/V and decreased PO intake.  He has become very fatigued and has not been able to ambulate.  Family also reports cough productive of yellow sputum and SOB.  In ED patient had CT chest with interval worsening of right circumferential masslike pleural thickening and right apical mass. Stable diffuse narrowing of the right mainstem bronchus.  Interval enlargement of 2 hypodense lesions in the left hepatic lobe compared to 11/8/2019 and concerning for metastatic lesions.  Pt. denies fever, CP, abdominal pain, or dysuria.   Patient was admitted to med/surg. He had failed bedside swallow evaluation and was made nothing by mouth except for medications.  Patient was evaluated by pulmonology. A GOC discussion was held with family and a decision was made to make him comfort care. Patient was pronounced at 06:28 on 12/05/2019

## 2019-12-09 LAB
CULTURE RESULTS: SIGNIFICANT CHANGE UP
CULTURE RESULTS: SIGNIFICANT CHANGE UP
SPECIMEN SOURCE: SIGNIFICANT CHANGE UP
SPECIMEN SOURCE: SIGNIFICANT CHANGE UP

## 2019-12-18 LAB
CULTURE RESULTS: SIGNIFICANT CHANGE UP
SPECIMEN SOURCE: SIGNIFICANT CHANGE UP

## 2019-12-21 NOTE — PROGRESS NOTE ADULT - ASSESSMENT
89yo M, w/ PMH w SSS s/p PPM , carotid artery disease, h/o TIAs, HTN, HLD, pw upper lobe lung mass with extensive pleural mets, right pleural effusions and mass effect on SVC.      Lung Mass  - He has a new-found right lung mass with mass effect on SVC in the setting of pacemaker h/o heart block, CAD, multiple TIAs.  He is s/p bronchoscopy.  Tolerated procedure well  - CT chest: Right upper lobe mass 10cm X 8cm X 4cm in the pleural surface/mediastinal surface, up to right hilum, pleural mets, mass effect on SVC, right pleural effusion  - s/p right thoracocentesis 1885 ml removed    Hx of SSS  - s/p PPM with last interrogated in 9/2019, good battery life (remaining capacity TAO 15%)  - Event noted on ppm c/w atrial noise, follow up with outpt cardiology, not affecting pacemaker function   - He follows up with Dr. Vela     TIA/carotid stenosis  - Resume Plavix if no further contraindication  - Monitor for signs and symptoms for signs of SVC syndrome including head, neck and face edema, swelling of upper arm and torso, skin cyanosis  - Continue statin    HTN  - BP stable  - On BB    HLD  - Continue statin    Awaiting discharge  To follow up with Dr. Vela as outpatient    Celeste Jeffery DNP, NP-C  Cardiology   Spectra #[phone]
normal...

## 2020-01-08 LAB
CULTURE RESULTS: SIGNIFICANT CHANGE UP
SPECIMEN SOURCE: SIGNIFICANT CHANGE UP

## 2020-12-21 PROBLEM — J20.9 ACUTE BRONCHITIS AND BRONCHIOLITIS: Status: RESOLVED | Noted: 2019-07-05 | Resolved: 2020-12-21

## 2022-01-10 NOTE — ED PROVIDER NOTE - ENMT, MLM
From: Concetta Crespo  To: Marciano Ricks  Sent: 1/8/2022 11:04 AM CST  Subject: Hydroxychloroquine/Azithromycin    Hi Dr. Ricks,  Just a quick follow up to my prescription. I did pick it up and took my first dose last night and another this morning.  1. Is it okay for me to take both pills at the same time? I saw something that taking a combination of hydroxychloroquine and azithromycin has been linked to significant heart risk.   2. Also, with hydroxychloroquine it does mention it can cause severe eye problems that can lead to lasting eye issues. I am assuming that is only if you take it for an extended period of time. My vision is already pretty bad so just wanted to check on this.  Thank you very much.  Concetta  
Please advise the below  
Airway patent, Nasal mucosa clear. Mouth with normal mucosa. Throat has no vesicles, no oropharyngeal exudates and uvula is midline.

## 2022-08-05 NOTE — ED PROVIDER NOTE - CROS ED NEURO ALL NEG
- - -
4/20/2022- NSR at a rate of 50, Right axid deviation, and a RBBB pattern. No ectopy. No interval change

## 2022-11-24 NOTE — ED PROVIDER NOTE - NS ED MD EM SELECTION
Vital Signs Last 24 Hrs  T(C): 37.6 (25 Nov 2022 00:53), Max: 38.7 (24 Nov 2022 22:26)  T(F): 99.7 (25 Nov 2022 00:53), Max: 101.6 (24 Nov 2022 22:26)  HR: 42 (25 Nov 2022 00:53) (41 - 69)  BP: 100/50 (25 Nov 2022 00:53) (95/50 - 140/105)  BP(mean): 67 (24 Nov 2022 20:18) (54 - 67)  RR: 21 (25 Nov 2022 00:53) (17 - 33)  SpO2: 100% (25 Nov 2022 00:53) (95% - 100%)    Parameters below as of 25 Nov 2022 00:53  Patient On (Oxygen Delivery Method): mask, nonrebreather O2 Flow (L/min): 15    CONSTITUTIONAL: Ill-appearing, in no apparent distress  EYES: No conjunctival or scleral injection, non-icteric;   ENMT: No external nasal lesions; MMM  NECK: Trachea midline without palpable neck mass; thyroid not enlarged and non-tender  RESPIRATORY: Breathing comfortably; no dullness to percussion; lungs CTA without wheeze/rhonchi/rales  CARDIOVASCULAR: Sinus bradycardia. +S1S2 no M/G/R; pedal pulses full and symmetric; no lower extremity edema  GASTROINTESTINAL: No palpable masses or tenderness, +BS throughout, no rebound/guarding; no hepatosplenomegaly; no hernia palpated  LYMPHATIC: No cervical LAD or tenderness  SKIN: No rashes or ulcers noted  NEUROLOGIC: Unable to assess CN II-XII; Given AMS, unable to assess sensation in LEs b/l to light touch  PSYCHIATRIC: A&Ox0; unable to assess mood and affect [id number] Comprehensive

## 2023-04-01 NOTE — H&P ADULT - NSICDXPASTMEDICALHX_GEN_ALL_CORE_FT
PAST MEDICAL HISTORY:  Carpal tunnel syndrome     Diverticulitis     GERD (gastroesophageal reflux disease)     Heart block     HTN (hypertension)     Hyperlipidemia     Pacemaker 2007    Prostate cancer     TIA (transient ischemic attack) last TIA over 5 years ago as per patient Hypothyroidism

## 2023-04-09 NOTE — PATIENT PROFILE ADULT - DO YOU FEEL THREATENED BY OTHERS?
3-calculated by average/Not able to assess (calculate score using [facility] averaging method)
no

## 2023-09-27 NOTE — H&P ADULT - PROBLEM SELECTOR PLAN 6
Labs drawn without difficulty. Left unit ambulatory with steady gait. Aware of appointment on Friday. - Continue PPI therapy daily

## 2024-06-12 NOTE — PLAN
Anesthesia Evaluation     Patient summary reviewed and Nursing notes reviewed   history of anesthetic complications:  PONV  NPO Solid Status: > 8 hours  NPO Liquid Status: > 2 hours           Airway   Mallampati: III  Possible difficult intubation  Dental    (+) upper dentures and edentulous    Pulmonary    (+) ,shortness of breath  (-) asthma, recent URI, sleep apnea, not a smoker    ROS comment: Sats 99% RA   Cardiovascular     ECG reviewed  Patient on routine beta blocker    (+) hypertension, past MI (last year in [place] - no CATH Stents enzymes elevation) , CHF Diastolic >=55%, DVT, hyperlipidemia  (-) dysrhythmias, angina, cardiac stents    ROS comment: SB on ECG (B blockers)  ECHO last year- normal EF -w/u in 2017 w Holter and MPS was notmal     Neuro/Psych  (+) psychiatric history Anxiety, dementia  (-) seizures, CVA  GI/Hepatic/Renal/Endo    (+) GERD  (-) no renal disease, diabetes, no thyroid disorder    Musculoskeletal     Abdominal    Substance History      OB/GYN          Other   arthritis,   history of cancer (L tonsil ca)    ROS/Med Hx Other: Poor historian       Phys Exam Other: 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    Plan discussed with CRNA.    CODE STATUS:    Medical Intervention Limits: No intubation (DNI)  Level Of Support Discussed With: Patient  Code Status (Patient has no pulse and is not breathing): No CPR (Do Not Attempt to Resuscitate)  Medical Interventions (Patient has pulse or is breathing): Limited Support      
[FreeTextEntry1] : Follow up with GI
